# Patient Record
Sex: FEMALE | Race: WHITE | NOT HISPANIC OR LATINO | URBAN - METROPOLITAN AREA
[De-identification: names, ages, dates, MRNs, and addresses within clinical notes are randomized per-mention and may not be internally consistent; named-entity substitution may affect disease eponyms.]

---

## 2018-04-07 ENCOUNTER — INPATIENT (INPATIENT)
Facility: HOSPITAL | Age: 78
LOS: 3 days | Discharge: HOME | End: 2018-04-11
Attending: INTERNAL MEDICINE

## 2018-04-07 VITALS
HEART RATE: 84 BPM | SYSTOLIC BLOOD PRESSURE: 90 MMHG | OXYGEN SATURATION: 98 % | RESPIRATION RATE: 22 BRPM | TEMPERATURE: 96 F | DIASTOLIC BLOOD PRESSURE: 58 MMHG

## 2018-04-07 DIAGNOSIS — C85.19 UNSPECIFIED B-CELL LYMPHOMA, EXTRANODAL AND SOLID ORGAN SITES: ICD-10-CM

## 2018-04-07 DIAGNOSIS — Z79.1 LONG TERM (CURRENT) USE OF NON-STEROIDAL ANTI-INFLAMMATORIES (NSAID): ICD-10-CM

## 2018-04-07 DIAGNOSIS — Z98.890 OTHER SPECIFIED POSTPROCEDURAL STATES: Chronic | ICD-10-CM

## 2018-04-07 DIAGNOSIS — Z90.49 ACQUIRED ABSENCE OF OTHER SPECIFIED PARTS OF DIGESTIVE TRACT: Chronic | ICD-10-CM

## 2018-04-07 DIAGNOSIS — C83.09 SMALL CELL B-CELL LYMPHOMA, EXTRANODAL AND SOLID ORGAN SITES: ICD-10-CM

## 2018-04-07 LAB
ALBUMIN SERPL ELPH-MCNC: 4 G/DL — SIGNIFICANT CHANGE UP (ref 3.5–5.2)
ALP SERPL-CCNC: 112 U/L — SIGNIFICANT CHANGE UP (ref 30–115)
ALT FLD-CCNC: 9 U/L — SIGNIFICANT CHANGE UP (ref 0–41)
ANION GAP SERPL CALC-SCNC: 20 MMOL/L — HIGH (ref 7–14)
APTT BLD: 29.8 SEC — SIGNIFICANT CHANGE UP (ref 27–39.2)
AST SERPL-CCNC: 14 U/L — SIGNIFICANT CHANGE UP (ref 0–41)
BASOPHILS # BLD AUTO: 0.02 K/UL — SIGNIFICANT CHANGE UP (ref 0–0.2)
BASOPHILS # BLD AUTO: 0.04 K/UL — SIGNIFICANT CHANGE UP (ref 0–0.2)
BASOPHILS NFR BLD AUTO: 0.2 % — SIGNIFICANT CHANGE UP (ref 0–1)
BASOPHILS NFR BLD AUTO: 0.3 % — SIGNIFICANT CHANGE UP (ref 0–1)
BILIRUB SERPL-MCNC: 0.4 MG/DL — SIGNIFICANT CHANGE UP (ref 0.2–1.2)
BUN SERPL-MCNC: 48 MG/DL — HIGH (ref 10–20)
C DIFF BY PCR RESULT: NEGATIVE — SIGNIFICANT CHANGE UP
C DIFF TOX GENS STL QL NAA+PROBE: SIGNIFICANT CHANGE UP
CALCIUM SERPL-MCNC: 9.6 MG/DL — SIGNIFICANT CHANGE UP (ref 8.5–10.1)
CHLORIDE SERPL-SCNC: 101 MMOL/L — SIGNIFICANT CHANGE UP (ref 98–110)
CK MB CFR SERPL CALC: 2.1 NG/ML — SIGNIFICANT CHANGE UP (ref 0.6–6.3)
CK SERPL-CCNC: 50 U/L — SIGNIFICANT CHANGE UP (ref 0–225)
CK SERPL-CCNC: 64 U/L — SIGNIFICANT CHANGE UP (ref 0–225)
CO2 SERPL-SCNC: 20 MMOL/L — SIGNIFICANT CHANGE UP (ref 17–32)
CREAT SERPL-MCNC: 1.4 MG/DL — SIGNIFICANT CHANGE UP (ref 0.7–1.5)
EOSINOPHIL # BLD AUTO: 0.05 K/UL — SIGNIFICANT CHANGE UP (ref 0–0.7)
EOSINOPHIL # BLD AUTO: 0.13 K/UL — SIGNIFICANT CHANGE UP (ref 0–0.7)
EOSINOPHIL NFR BLD AUTO: 0.3 % — SIGNIFICANT CHANGE UP (ref 0–8)
EOSINOPHIL NFR BLD AUTO: 2.2 % — SIGNIFICANT CHANGE UP (ref 0–8)
GAS PNL BLDV: SIGNIFICANT CHANGE UP
GLUCOSE SERPL-MCNC: 188 MG/DL — HIGH (ref 70–99)
HCT VFR BLD CALC: 30.6 % — LOW (ref 37–47)
HCT VFR BLD CALC: 30.7 % — LOW (ref 37–47)
HCT VFR BLD CALC: 35.9 % — LOW (ref 37–47)
HCT VFR BLD CALC: 37.6 % — SIGNIFICANT CHANGE UP (ref 37–47)
HGB BLD-MCNC: 10 G/DL — LOW (ref 12–16)
HGB BLD-MCNC: 10.1 G/DL — LOW (ref 12–16)
HGB BLD-MCNC: 11.6 G/DL — LOW (ref 12–16)
HGB BLD-MCNC: 12.5 G/DL — SIGNIFICANT CHANGE UP (ref 12–16)
IMM GRANULOCYTES NFR BLD AUTO: 0.3 % — SIGNIFICANT CHANGE UP (ref 0.1–0.3)
IMM GRANULOCYTES NFR BLD AUTO: 0.5 % — HIGH (ref 0.1–0.3)
INR BLD: 2.37 RATIO — HIGH (ref 0.65–1.3)
LYMPHOCYTES # BLD AUTO: 1.02 K/UL — LOW (ref 1.2–3.4)
LYMPHOCYTES # BLD AUTO: 1.54 K/UL — SIGNIFICANT CHANGE UP (ref 1.2–3.4)
LYMPHOCYTES # BLD AUTO: 25.8 % — SIGNIFICANT CHANGE UP (ref 20.5–51.1)
LYMPHOCYTES # BLD AUTO: 5.4 % — LOW (ref 20.5–51.1)
MCHC RBC-ENTMCNC: 26.9 PG — LOW (ref 27–31)
MCHC RBC-ENTMCNC: 27.2 PG — SIGNIFICANT CHANGE UP (ref 27–31)
MCHC RBC-ENTMCNC: 27.2 PG — SIGNIFICANT CHANGE UP (ref 27–31)
MCHC RBC-ENTMCNC: 27.5 PG — SIGNIFICANT CHANGE UP (ref 27–31)
MCHC RBC-ENTMCNC: 32.3 G/DL — SIGNIFICANT CHANGE UP (ref 32–37)
MCHC RBC-ENTMCNC: 32.7 G/DL — SIGNIFICANT CHANGE UP (ref 32–37)
MCHC RBC-ENTMCNC: 32.9 G/DL — SIGNIFICANT CHANGE UP (ref 32–37)
MCHC RBC-ENTMCNC: 33.2 G/DL — SIGNIFICANT CHANGE UP (ref 32–37)
MCV RBC AUTO: 81.6 FL — SIGNIFICANT CHANGE UP (ref 81–99)
MCV RBC AUTO: 82.8 FL — SIGNIFICANT CHANGE UP (ref 81–99)
MCV RBC AUTO: 83.4 FL — SIGNIFICANT CHANGE UP (ref 81–99)
MCV RBC AUTO: 84.3 FL — SIGNIFICANT CHANGE UP (ref 81–99)
MONOCYTES # BLD AUTO: 0.97 K/UL — HIGH (ref 0.1–0.6)
MONOCYTES # BLD AUTO: 0.99 K/UL — HIGH (ref 0.1–0.6)
MONOCYTES NFR BLD AUTO: 16.6 % — HIGH (ref 1.7–9.3)
MONOCYTES NFR BLD AUTO: 5.1 % — SIGNIFICANT CHANGE UP (ref 1.7–9.3)
NEUTROPHILS # BLD AUTO: 16.83 K/UL — HIGH (ref 1.4–6.5)
NEUTROPHILS # BLD AUTO: 3.28 K/UL — SIGNIFICANT CHANGE UP (ref 1.4–6.5)
NEUTROPHILS NFR BLD AUTO: 54.8 % — SIGNIFICANT CHANGE UP (ref 42.2–75.2)
NEUTROPHILS NFR BLD AUTO: 88.5 % — HIGH (ref 42.2–75.2)
NRBC # BLD: 0 /100 WBCS — SIGNIFICANT CHANGE UP (ref 0–0)
NRBC # BLD: 0 /100 WBCS — SIGNIFICANT CHANGE UP (ref 0–0)
PLATELET # BLD AUTO: 153 K/UL — SIGNIFICANT CHANGE UP (ref 130–400)
PLATELET # BLD AUTO: 185 K/UL — SIGNIFICANT CHANGE UP (ref 130–400)
PLATELET # BLD AUTO: 190 K/UL — SIGNIFICANT CHANGE UP (ref 130–400)
PLATELET # BLD AUTO: 228 K/UL — SIGNIFICANT CHANGE UP (ref 130–400)
POTASSIUM SERPL-MCNC: 4.8 MMOL/L — SIGNIFICANT CHANGE UP (ref 3.5–5)
POTASSIUM SERPL-SCNC: 4.8 MMOL/L — SIGNIFICANT CHANGE UP (ref 3.5–5)
PROT SERPL-MCNC: 7.1 G/DL — SIGNIFICANT CHANGE UP (ref 6–8)
PROTHROM AB SERPL-ACNC: 26 SEC — HIGH (ref 9.95–12.87)
RBC # BLD: 3.67 M/UL — LOW (ref 4.2–5.4)
RBC # BLD: 3.76 M/UL — LOW (ref 4.2–5.4)
RBC # BLD: 4.26 M/UL — SIGNIFICANT CHANGE UP (ref 4.2–5.4)
RBC # BLD: 4.54 M/UL — SIGNIFICANT CHANGE UP (ref 4.2–5.4)
RBC # FLD: 15.8 % — HIGH (ref 11.5–14.5)
RBC # FLD: 15.8 % — HIGH (ref 11.5–14.5)
RBC # FLD: 15.9 % — HIGH (ref 11.5–14.5)
RBC # FLD: 15.9 % — HIGH (ref 11.5–14.5)
SODIUM SERPL-SCNC: 141 MMOL/L — SIGNIFICANT CHANGE UP (ref 135–146)
TROPONIN T SERPL-MCNC: <0.01 NG/ML — SIGNIFICANT CHANGE UP
TROPONIN T SERPL-MCNC: <0.01 NG/ML — SIGNIFICANT CHANGE UP
TYPE + AB SCN PNL BLD: SIGNIFICANT CHANGE UP
WBC # BLD: 10.79 K/UL — SIGNIFICANT CHANGE UP (ref 4.8–10.8)
WBC # BLD: 19.01 K/UL — HIGH (ref 4.8–10.8)
WBC # BLD: 5.98 K/UL — SIGNIFICANT CHANGE UP (ref 4.8–10.8)
WBC # BLD: 7.7 K/UL — SIGNIFICANT CHANGE UP (ref 4.8–10.8)
WBC # FLD AUTO: 10.79 K/UL — SIGNIFICANT CHANGE UP (ref 4.8–10.8)
WBC # FLD AUTO: 19.01 K/UL — HIGH (ref 4.8–10.8)
WBC # FLD AUTO: 5.98 K/UL — SIGNIFICANT CHANGE UP (ref 4.8–10.8)
WBC # FLD AUTO: 7.7 K/UL — SIGNIFICANT CHANGE UP (ref 4.8–10.8)

## 2018-04-07 RX ORDER — MORPHINE SULFATE 50 MG/1
4 CAPSULE, EXTENDED RELEASE ORAL ONCE
Qty: 0 | Refills: 0 | Status: DISCONTINUED | OUTPATIENT
Start: 2018-04-07 | End: 2018-04-07

## 2018-04-07 RX ORDER — TRAMADOL HYDROCHLORIDE 50 MG/1
50 TABLET ORAL THREE TIMES A DAY
Qty: 0 | Refills: 0 | Status: DISCONTINUED | OUTPATIENT
Start: 2018-04-07 | End: 2018-04-11

## 2018-04-07 RX ORDER — METOPROLOL TARTRATE 50 MG
50 TABLET ORAL ONCE
Qty: 0 | Refills: 0 | Status: COMPLETED | OUTPATIENT
Start: 2018-04-07 | End: 2018-04-07

## 2018-04-07 RX ORDER — GABAPENTIN 400 MG/1
100 CAPSULE ORAL THREE TIMES A DAY
Qty: 0 | Refills: 0 | Status: DISCONTINUED | OUTPATIENT
Start: 2018-04-07 | End: 2018-04-11

## 2018-04-07 RX ORDER — PANTOPRAZOLE SODIUM 20 MG/1
80 TABLET, DELAYED RELEASE ORAL ONCE
Qty: 0 | Refills: 0 | Status: COMPLETED | OUTPATIENT
Start: 2018-04-07 | End: 2018-04-07

## 2018-04-07 RX ORDER — METOPROLOL TARTRATE 50 MG
50 TABLET ORAL DAILY
Qty: 0 | Refills: 0 | Status: DISCONTINUED | OUTPATIENT
Start: 2018-04-07 | End: 2018-04-11

## 2018-04-07 RX ORDER — NYSTATIN CREAM 100000 [USP'U]/G
1 CREAM TOPICAL
Qty: 0 | Refills: 0 | Status: DISCONTINUED | OUTPATIENT
Start: 2018-04-07 | End: 2018-04-11

## 2018-04-07 RX ORDER — PANTOPRAZOLE SODIUM 20 MG/1
40 TABLET, DELAYED RELEASE ORAL
Qty: 0 | Refills: 0 | Status: DISCONTINUED | OUTPATIENT
Start: 2018-04-07 | End: 2018-04-08

## 2018-04-07 RX ORDER — SODIUM CHLORIDE 9 MG/ML
1000 INJECTION INTRAMUSCULAR; INTRAVENOUS; SUBCUTANEOUS
Qty: 0 | Refills: 0 | Status: DISCONTINUED | OUTPATIENT
Start: 2018-04-07 | End: 2018-04-11

## 2018-04-07 RX ORDER — SODIUM CHLORIDE 9 MG/ML
1000 INJECTION, SOLUTION INTRAVENOUS ONCE
Qty: 0 | Refills: 0 | Status: COMPLETED | OUTPATIENT
Start: 2018-04-07 | End: 2018-04-07

## 2018-04-07 RX ORDER — PROTHROMBIN COMPLEX CONCENTRATE (HUMAN) 25.5; 16.5; 24; 22; 22; 26 [IU]/ML; [IU]/ML; [IU]/ML; [IU]/ML; [IU]/ML; [IU]/ML
1000 POWDER, FOR SOLUTION INTRAVENOUS ONCE
Qty: 0 | Refills: 0 | Status: DISCONTINUED | OUTPATIENT
Start: 2018-04-07 | End: 2018-04-07

## 2018-04-07 RX ADMIN — NYSTATIN CREAM 1 APPLICATION(S): 100000 CREAM TOPICAL at 18:23

## 2018-04-07 RX ADMIN — PANTOPRAZOLE SODIUM 80 MILLIGRAM(S): 20 TABLET, DELAYED RELEASE ORAL at 05:54

## 2018-04-07 RX ADMIN — MORPHINE SULFATE 4 MILLIGRAM(S): 50 CAPSULE, EXTENDED RELEASE ORAL at 07:33

## 2018-04-07 RX ADMIN — Medication 50 MILLIGRAM(S): at 14:16

## 2018-04-07 RX ADMIN — MORPHINE SULFATE 4 MILLIGRAM(S): 50 CAPSULE, EXTENDED RELEASE ORAL at 08:01

## 2018-04-07 RX ADMIN — GABAPENTIN 100 MILLIGRAM(S): 400 CAPSULE ORAL at 18:21

## 2018-04-07 RX ADMIN — PANTOPRAZOLE SODIUM 40 MILLIGRAM(S): 20 TABLET, DELAYED RELEASE ORAL at 18:22

## 2018-04-07 RX ADMIN — SODIUM CHLORIDE 2000 MILLILITER(S): 9 INJECTION, SOLUTION INTRAVENOUS at 05:44

## 2018-04-07 RX ADMIN — SODIUM CHLORIDE 75 MILLILITER(S): 9 INJECTION INTRAMUSCULAR; INTRAVENOUS; SUBCUTANEOUS at 20:52

## 2018-04-07 NOTE — ED PROVIDER NOTE - MEDICAL DECISION MAKING DETAILS
76yo woman afib on xarelto endorsed to me by Dr Perez for workup of weakness/UGIB. Pt remained hemodynamically stable, no further diarrhea. CTA significant only for enteritis. Spoke with GI, no reversal for now as last dose xarelto was 24+hours ago and not currently bleeding. Plan for observation and endoscopy/colonoscopy monday. Pt seen by ICU, ok for tele for now.

## 2018-04-07 NOTE — CONSULT NOTE ADULT - SUBJECTIVE AND OBJECTIVE BOX
Chief Complaint:  Patient is a 77y old  Female who presents with a chief complaint of melena.    76 yo F PMH of A-Fib on Xarelto, HTN, CAD sp PCI, stenting 9 yrs ago on ASA presented with 1  day of weakness and watery diarrhea described as black stools. Pt also c/o mild cramping pain in the abdomen but no N/V, fever,  changes in weight or appetite. Pt never had EGD or colonoscopy done in the past. But does states that she had a part of small bowel removed 30 yrs dur to ? nodules in the bowel.  Last dose of Xarelto was yesterday in morning and last BM was last night, more than 12 hrs ago. Pt in rapid A Fib in ER but BP and Hb normal.	      Allergies:  penicillin (Anaphylaxis)  penicillin (Unknown)      PMHX/PSHX:  Atrial fibrillation  HTN (hypertension)  Diverticulitis  History of colon resection  H/O abdominal hysterectomy    ROS:     General:  No wt loss, fevers, chills, night sweats.  Eyes:  Good vision, no reported pain  ENT:  No sore throat, pain, runny nose, dysphagia  CV:  No pain, palpitations, hypo/hypertension  Resp:  No dyspnea, cough, tachypnea, wheezing  GI:  See HPI  :  No pain, bleeding, incontinence, nocturia  Muscle:  No pain, weakness  Neuro:  No weakness, tingling, memory problems  Skin:  No rash, edema      PHYSICAL EXAM:     GENERAL:  Appears stated age, overweight, no distress  CHEST:  Full & symmetric excursion, no increased effort, breath sounds clear  HEART:  Regular rhythm, S1, S2, no added sounds  ABDOMEN:  Soft, non-tender, non-distended, normoactive bowel sounds  JESSICA : empty vault, brown loose stool  NEURO:  Alert, oriented, non focal    Vital Signs:  Vital Signs Last 24 Hrs  T(C): 36.4 (07 Apr 2018 07:21), Max: 37.3 (07 Apr 2018 05:10)  T(F): 97.5 (07 Apr 2018 07:21), Max: 99.2 (07 Apr 2018 05:10)  HR: 101 (07 Apr 2018 07:21) (84 - 140)  BP: 141/60 (07 Apr 2018 07:21) (90/58 - 141/60)  BP(mean): --  RR: 18 (07 Apr 2018 07:21) (18 - 22)  SpO2: 100% (07 Apr 2018 07:21) (98% - 100%)  Daily     Daily     LABS:                        12.5   19.01 )-----------( 228      ( 07 Apr 2018 05:00 )             37.6     04-07    141  |  101  |  48<H>  ----------------------------<  188<H>  4.8   |  20  |  1.4    Ca    9.6      07 Apr 2018 05:00    TPro  7.1  /  Alb  4.0  /  TBili  0.4  /  DBili  x   /  AST  14  /  ALT  9   /  AlkPhos  112  04-07    LIVER FUNCTIONS - ( 07 Apr 2018 05:00 )  Alb: 4.0 g/dL / Pro: 7.1 g/dL / ALK PHOS: 112 U/L / ALT: 9 U/L / AST: 14 U/L / GGT: x           PT/INR - ( 07 Apr 2018 05:00 )   PT: 26.00 sec;   INR: 2.37 ratio         PTT - ( 07 Apr 2018 05:00 )  PTT:29.8 sec        Imaging: < from: CT Angio Abdomen and Pelvis w/ IV Cont (04.07.18 @ 07:23) >  Findings compatible with enteritis involving the distal small bowel.    No definite evidence of intravenous contrast extravasation on the   arterial phase of imaging.      < end of copied text >

## 2018-04-07 NOTE — ED PROVIDER NOTE - PROGRESS NOTE DETAILS
dark guaiac positive stool spoke to GI Dr. Pereyra, will evaluate the pt pj - Pt signed out to me by Dr. Goldstein Spoke with Dr. Pereyra, awaiting evaluation Dr. Pereyra at bedside. Plan for EGD/colo on monday Spoke with Dr. Perez (pulm/ICU), will come see patient Spoke with Dr. Arredondo (pulm/ICU), will come see patient Approved for ICU by Dr. Arredondo As per Dr. Arredondo, ICU if drop in Hb, else tele

## 2018-04-07 NOTE — H&P ADULT - ASSESSMENT
76 yo female coming from home with PMH of Afib on Xarelto, HTN, Diverticulitis comes to the ED with c/o dark stools and lethargy for last 1 day. In the ED her BP was 90/50 responded to normal saline and became hemodynamically stable.    # GI Bleed: likely lower GI bleed, R/O upper GI also, admit to regular floor, keep NPO, active type and screen, 2X 18 Iv gauge, Keep Hg> 8, Hold Xarelto, ASA as of now. Will possibly get EGD/Colonoscopy on Monday (4/9/2018) or early if rebleeds and hemodynamically unstable. Start on Protonix 40IV q12.  Pulm fellow saw the patient( No need of ICU monitoring)    - HTN: Hold on home meds, Keep MAP>65    - Afib on Xarelto : Hold xarelto, ASA as of now, consider calling cardiology.    DVT PPX: sequentials  GI PPX  OOB  NPO   Full code 78 yo female coming from home with PMH of Afib on Xarelto, HTN, Diverticulitis comes to the ED with c/o dark stools and lethargy for last 1 day. In the ED her BP was 90/50 responded to normal saline and became hemodynamically stable.    # GI Bleed: likely lower GI bleed, R/O upper GI also, admit to regular floor, keep NPO, active type and screen, 2X 18 Iv gauge, Keep Hg> 8, Hold Xarelto, ASA as of now. Will possibly get EGD/Colonoscopy on Monday (4/9/2018) or early if rebleeds and hemodynamically unstable. Start on Protonix 40IV q12.  Pulm fellow saw the patient( No need of ICU monitoring)    - HTN: Hold on home meds, Keep MAP>65    - Afib on Xarelto : Hold xarelto, consider calling cardiology for re evaluation.    DVT PPX: sequentials  GI PPX  OOB  NPO   Full code 76 yo female coming from home with PMH of Afib on Xarelto, HTN, Diverticulitis comes to the ED with c/o dark stools and lethargy for last 1 day. In the ED her BP was 90/50 responded to normal saline and became hemodynamically stable.    # GI Bleed: likely lower GI bleed, R/O upper GI also, admit to regular floor, keep NPO, active type and screen, 2X 18 Iv gauge, Keep Hg> 8, Hold Xarelto, ASA as of now. Will possibly get EGD/Colonoscopy on Monday (4/9/2018) or early if rebleeds and hemodynamically unstable. Start on Protonix 40IV q12.  Pulm fellow saw the patient( No need of ICU monitoring)    - HTN: Hold on home meds, Keep MAP>65    - Afib on Xarelto : currently HR @ 120Hold xarelto, consider calling cardiology for re evaluation.    DVT PPX: sequentials  GI PPX  OOB  NPO   Full code 78 yo female coming from home with PMH of Afib on Xarelto, HTN, Diverticulitis comes to the ED with c/o dark stools and lethargy for last 1 day. In the ED her BP was 90/50 responded to normal saline and became hemodynamically stable.    # GI Bleed: likely lower GI bleed, R/O upper GI also, admit to telemetry floor, keep NPO, active type and screen, 2X 18 Iv gauge, Keep Hg> 8, Hold Xarelto, ASA as of now. Will possibly get EGD/Colonoscopy on Monday (4/9/2018) or early if rebleeds and hemodynamically unstable. Start on Protonix 40IV q12.  Pulm fellow saw the patient( No need of ICU monitoring).    # enteritis: ON CT scan, will send stool WBC, cx, C diff. clinically seems non significant. No need of Abx.    - HTN: Hold on home meds, Keep MAP>65    - Afib on Xarelto : currently HR @ 120, will start metoprolol oral 50mg now. Hold xarelto, consider calling cardiology for re evaluation.    DVT PPX: sequentials  GI PPX  OOB  NPO   Full code 78 yo female coming from home with PMH of Afib on Xarelto, HTN, Diverticulitis comes to the ED with c/o dark stools and lethargy for last 1 day. In the ED her BP was 90/50 responded to normal saline and became hemodynamically stable.    # GI Bleed: likely lower GI bleed, R/O upper GI also, admit to telemetry floor, keep NPO, active type and screen, 2X 18 Iv gauge, Keep Hg> 8, Hold Xarelto, ASA as of now. Will possibly get EGD/Colonoscopy on Monday (4/9/2018) or early if rebleeds and hemodynamically unstable. Start on Protonix 40IV q12.  Pulm fellow saw the patient( No need of ICU monitoring).    # enteritis: ON CT scan, clinically seems non significant. No need of Abx, send stool studies if starts loose stools.    - HTN: Hold on home meds, Keep MAP>65    - Afib on Xarelto : currently HR @ 120, will start metoprolol oral 50mg now. Hold xarelto, consider calling cardiology for re evaluation.    DVT PPX: sequentials  GI PPX  OOB  NPO   Full code

## 2018-04-07 NOTE — ED ADULT NURSE REASSESSMENT NOTE - NS ED NURSE REASSESS COMMENT FT1
Pt received to care assessed a & ox 4, VSS in no acute distress. Pt returned from CT scan. will continue to monitor

## 2018-04-07 NOTE — CONSULT NOTE ADULT - SUBJECTIVE AND OBJECTIVE BOX
Patient is a 77y old  Female who presents with a chief complaint of diarrhea and fall. As per her for 1 day she is been having dark stool. At night she had a episode of presyncope after which ems was called in. In er found to be hypotensive and tachycardiac. Given fluid challenge to which she responded. Has been taking NSAIDS on and off for her arthritis and recent diagnosis of shingles. No alcohol use. Never had EGD or colonoscopy done. SP gi evaluation for possible ED on Monday.        PAST MEDICAL & SURGICAL HISTORY:  Atrial fibrillation  HTN (hypertension)  Diverticulitis  History of colon resection  H/O abdominal hysterectomy      SOCIAL HX: Quit few months: Smoking                             Allergies    penicillin (Anaphylaxis)  penicillin (Unknown)    PHYSICAL EXAM  Vital Signs Last 24 Hrs  T(C): 36.4 (07 Apr 2018 07:21), Max: 37.3 (07 Apr 2018 05:10)  T(F): 97.5 (07 Apr 2018 07:21), Max: 99.2 (07 Apr 2018 05:10)  HR: 101 (07 Apr 2018 07:21) (84 - 140)  BP: 141/60 (07 Apr 2018 07:21) (90/58 - 141/60)  BP(mean): --  RR: 18 (07 Apr 2018 07:21) (18 - 22)  SpO2: 100% (07 Apr 2018 07:21) (98% - 100%)    General:    HEENT: AAO x3            Lymph Nodes: No lymphadenopathy  Neck:  Supple  Lungs: Clear bilaterally  Cardiovascular: S1S2  Abdomen: Soft, non tender  Extremities: NO edema or cyanosis  Skin: Intact  Neuro: non focal          LAB:                        12.5   19.01 )-----------( 228      ( 07 Apr 2018 05:00 )             37.6                                               04-07    141  |  101  |  48<H>  ----------------------------<  188<H>  4.8   |  20  |  1.4    Ca    9.6      07 Apr 2018 05:00    TPro  7.1  /  Alb  4.0  /  TBili  0.4  /  DBili  x   /  AST  14  /  ALT  9   /  AlkPhos  112  04-07      PT/INR - ( 07 Apr 2018 05:00 )   PT: 26.00 sec;   INR: 2.37 ratio         PTT - ( 07 Apr 2018 05:00 )  PTT:29.8 sec                                               CARDIAC MARKERS ( 07 Apr 2018 05:00 )  x     / <0.01 ng/mL / 50 U/L / x     / 2.1 ng/mL                                            LIVER FUNCTIONS - ( 07 Apr 2018 05:00 )  Alb: 4.0 g/dL / Pro: 7.1 g/dL / ALK PHOS: 112 U/L / ALT: 9 U/L / AST: 14 U/L / GGT: x

## 2018-04-07 NOTE — ED PROVIDER NOTE - OBJECTIVE STATEMENT
78 yo F w hxo f a fib on xarelto, htn, diverticulitis c/o 1 day of weakness and dark diarrhea.  No n/v.  no chest pain.  Pt states she got up at night to go the bathroom and felt like her legs buckled.

## 2018-04-07 NOTE — ED PROVIDER NOTE - ATTENDING CONTRIBUTION TO CARE
78 yo hx of afib on xarelto, s/p dizzy/near syncope episode at home, c/o 1 day of dark stools and ab pain.  no cp or sob. no n, v. pt in mild dist, +pallor, pink conj, ncat, perrl, neck sup, ctab, diffusely tender, dark stool guiac +, no gross blood. non focal. will get labs, fluids, ekg, cxr, monitor.

## 2018-04-07 NOTE — H&P ADULT - NSHPLABSRESULTS_GEN_ALL_CORE
11.6   10.79 )-----------( 190      ( 07 Apr 2018 11:14 )             35.9       04-07    141  |  101  |  48<H>  ----------------------------<  188<H>  4.8   |  20  |  1.4    Ca    9.6      07 Apr 2018 05:00    TPro  7.1  /  Alb  4.0  /  TBili  0.4  /  DBili  x   /  AST  14  /  ALT  9   /  AlkPhos  112  04-07                  PT/INR - ( 07 Apr 2018 05:00 )   PT: 26.00 sec;   INR: 2.37 ratio         PTT - ( 07 Apr 2018 05:00 )  PTT:29.8 sec    Lactate Trend      CARDIAC MARKERS ( 07 Apr 2018 05:00 )  x     / <0.01 ng/mL / 50 U/L / x     / 2.1 ng/mL

## 2018-04-07 NOTE — ED ADULT NURSE NOTE - OBJECTIVE STATEMENT
Pt reports diarrhea, intermittent cramping abdominal pain, weakness, mild shortness of breath since yesterday.

## 2018-04-07 NOTE — H&P ADULT - ATTENDING COMMENTS
Patient was evaluated and examined by bedside, decreased abdominal pain, no rectal bleeding or melena episodes.    All labs, radiology studies, VS was reviewed  I have reviewed medical plan outlined by Medical resident   -Possible GI bleeding event at home, no further episodes of bleeding, with stable hemoglobin level, start patient on clear liquid diet, change to po protonix tx.  f/up GI  - Acute Enteritis- abdominal pain decreased, started on Clear liquid diet trial  -h/o afib- xarelto on hold, uncontrolled rate, f/up TSH LEVELS, increased lopressor dose, continue cardiac monitoring  -anemia- continue to monitor daily CBC

## 2018-04-07 NOTE — H&P ADULT - HISTORY OF PRESENT ILLNESS
76 yo female coming from home, with PMH stated below including Diverticultis, Afib on xarelto, post herpetic neuralgia taking NSAIDs, CAD on ASA, was doing fine until yesterday when she felt lower abdominal cramps followed by very dark bowel movement, semi solid in nature, no blood seen. She had total of 6 episodes, with the last 3 of them were brown but she started feeling dizzy so she decided to come tot the ED.   In the ED BP was 90/60 HR 84/min, was given NS 1L, she responded to fluids. As of now she is c/o cramps and last BM was 12 hrs ago.

## 2018-04-07 NOTE — CONSULT NOTE ADULT - ASSESSMENT
IMPRESSION:  ? Upper GI bleed  enteritis on CT abdomen  HO Afib on xarelto  Active NSAIDs use  Smoker+        PLAN:    CNS: NO depressant    HEENT:  Oral care    PULMONARY:  HOB @ 45 degrees    CARDIOVASCULAR: keep i=O    GI:   Continue with protnix drip for now.                                        Feeding: Feeding as tolerated if no acute drop in hemoglobin and intervention by GI.     RENAL:  F/u  lytes.  Correct as needed     INFECTIOUS DISEASE: PAN cultutre    HEMATOLOGICAL:  Sequentials for now. Hold Xarelto and aspirin.    ENDOCRINE:  Follow up FS.     MUSCULOSKELETAL: Out of bed to chair    CODE STATUS: FULL CODE    DISPOSITION: Pt requires continued monitoring in the MICU IMPRESSION:  ? Upper GI bleed  enteritis on CT abdomen  HO Afib on xarelto  Active NSAIDs use  Smoker+        PLAN:    CNS: NO depressant    HEENT:  Oral care    PULMONARY:  HOB @ 45 degrees    CARDIOVASCULAR: keep i=O    GI:   Continue with protnix Q12                                     Feeding: Feeding as tolerated if no acute drop in hemoglobin and intervention by GI.     RENAL:  F/u  lytes.  Correct as needed     INFECTIOUS DISEASE: PAN cultutre    HEMATOLOGICAL:  Sequentials for now. Hold Xarelto and aspirin.     ENDOCRINE:  Follow up FS.     MUSCULOSKELETAL: Out of bed to chair    CODE STATUS: FULL CODE    DISPOSITION: Pt requires continued monitoring in the MICU IMPRESSION:  ? Upper GI bleed  enteritis on CT abdomen  HO Afib on xarelto  Active NSAIDs use  Smoker+        PLAN:    CNS: NO depressant    HEENT:  Oral care    PULMONARY:  HOB @ 45 degrees    CARDIOVASCULAR: keep i=O    GI:   Continue with protnix Q12                                     Feeding: Feeding as tolerated if no acute drop in hemoglobin and intervention by GI.     RENAL:  F/u  lytes.  Correct as needed     INFECTIOUS DISEASE: PAN cultutre    HEMATOLOGICAL:  Sequentials for now. Hold Xarelto and aspirin.     ENDOCRINE:  Follow up FS.     MUSCULOSKELETAL: Out of bed to chair    CODE STATUS: FULL CODE    DISPOSITION: Pt does not  require continued monitoring in the MICU

## 2018-04-07 NOTE — ED PROVIDER NOTE - NS ED ROS FT
Eyes:  No visual changes, eye pain or discharge.  ENMT:  No hearing changes, pain, no sore throat or runny nose, no difficulty swallowing  Cardiac:  No chest pain, SOB or edema. No chest pain with exertion.  Respiratory:  No cough or respiratory distress. No hemoptysis. No history of asthma or RAD.  GI:  melena   :  No dysuria, frequency or burning.  MS:  No myalgia, muscle weakness, joint pain or back pain.  Neuro: weakness   Skin:  No skin rash.   Endocrine: No history of thyroid disease or diabetes.

## 2018-04-07 NOTE — H&P ADULT - PMH
Atrial fibrillation    Diverticulitis    HTN (hypertension) Atrial fibrillation    CAD S/P percutaneous coronary angioplasty    Diverticulitis    HTN (hypertension)

## 2018-04-07 NOTE — H&P ADULT - NSHPPHYSICALEXAM_GEN_ALL_CORE
PHYSICAL EXAM:      Constitutional: well developed and well nourished    Respiratory: lungs B/L clear on auscultation    Cardiovascular: S1S2 normal, no murmur heard    Gastrointestinal: Abd soft, non distended, non tender, BS+    Extremities: No pedal edema    Neurological: AAOX3, No FND PHYSICAL EXAM:      Constitutional: well developed and well nourished    Respiratory: lungs B/L clear on auscultation    Cardiovascular: tachycardia, no murmur heard, irregular rhytm    Gastrointestinal: Abd soft, non distended, non tender, BS+    Extremities: No pedal edema    Neurological: AAOX3, No FND

## 2018-04-07 NOTE — CONSULT NOTE ADULT - ASSESSMENT
76 yo F PMH of A-Fib on Xarelto, HTN, CAD sp PCI, stenting 9 yrs ago on ASA presented with 1  day of weakness and watery diarrhea described as black stools and cramping abdopminal pain and was found to have enteritis on CT abdomen , no drop in Hb and hemodynamically stable.    ?  GI Bleed :  - Keep NPO for now, 2 18 G IVs, Active type and Screen  - PPI IV Q12H, Gentle Hydration.  - Monitor CBC and transfuse if needed.  - Hold Xarelto for now,  cardiology eval for Rapid A Fib.  - Will schedule for EGD and Colonoscopy after holding Xarelto for 72 hrs or early in case of active bleed.  - Will follow up.

## 2018-04-08 LAB
ALBUMIN SERPL ELPH-MCNC: 3.4 G/DL — LOW (ref 3.5–5.2)
ALP SERPL-CCNC: 86 U/L — SIGNIFICANT CHANGE UP (ref 30–115)
ALT FLD-CCNC: 6 U/L — SIGNIFICANT CHANGE UP (ref 0–41)
ANION GAP SERPL CALC-SCNC: 14 MMOL/L — SIGNIFICANT CHANGE UP (ref 7–14)
AST SERPL-CCNC: 13 U/L — SIGNIFICANT CHANGE UP (ref 0–41)
BASOPHILS # BLD AUTO: 0.01 K/UL — SIGNIFICANT CHANGE UP (ref 0–0.2)
BASOPHILS NFR BLD AUTO: 0.2 % — SIGNIFICANT CHANGE UP (ref 0–1)
BILIRUB SERPL-MCNC: 0.5 MG/DL — SIGNIFICANT CHANGE UP (ref 0.2–1.2)
BUN SERPL-MCNC: 26 MG/DL — HIGH (ref 10–20)
CALCIUM SERPL-MCNC: 8.4 MG/DL — LOW (ref 8.5–10.1)
CHLORIDE SERPL-SCNC: 108 MMOL/L — SIGNIFICANT CHANGE UP (ref 98–110)
CO2 SERPL-SCNC: 21 MMOL/L — SIGNIFICANT CHANGE UP (ref 17–32)
CREAT SERPL-MCNC: 0.9 MG/DL — SIGNIFICANT CHANGE UP (ref 0.7–1.5)
EOSINOPHIL # BLD AUTO: 0.19 K/UL — SIGNIFICANT CHANGE UP (ref 0–0.7)
EOSINOPHIL NFR BLD AUTO: 3.7 % — SIGNIFICANT CHANGE UP (ref 0–8)
GLUCOSE SERPL-MCNC: 107 MG/DL — HIGH (ref 70–99)
HCT VFR BLD CALC: 32 % — LOW (ref 37–47)
HGB BLD-MCNC: 10.4 G/DL — LOW (ref 12–16)
IMM GRANULOCYTES NFR BLD AUTO: 0.2 % — SIGNIFICANT CHANGE UP (ref 0.1–0.3)
LYMPHOCYTES # BLD AUTO: 1.36 K/UL — SIGNIFICANT CHANGE UP (ref 1.2–3.4)
LYMPHOCYTES # BLD AUTO: 26.6 % — SIGNIFICANT CHANGE UP (ref 20.5–51.1)
MAGNESIUM SERPL-MCNC: 2.1 MG/DL — SIGNIFICANT CHANGE UP (ref 1.8–2.4)
MCHC RBC-ENTMCNC: 27.2 PG — SIGNIFICANT CHANGE UP (ref 27–31)
MCHC RBC-ENTMCNC: 32.5 G/DL — SIGNIFICANT CHANGE UP (ref 32–37)
MCV RBC AUTO: 83.6 FL — SIGNIFICANT CHANGE UP (ref 81–99)
MONOCYTES # BLD AUTO: 0.78 K/UL — HIGH (ref 0.1–0.6)
MONOCYTES NFR BLD AUTO: 15.3 % — HIGH (ref 1.7–9.3)
NEUTROPHILS # BLD AUTO: 2.76 K/UL — SIGNIFICANT CHANGE UP (ref 1.4–6.5)
NEUTROPHILS NFR BLD AUTO: 54 % — SIGNIFICANT CHANGE UP (ref 42.2–75.2)
PLATELET # BLD AUTO: 107 K/UL — LOW (ref 130–400)
POTASSIUM SERPL-MCNC: 4.8 MMOL/L — SIGNIFICANT CHANGE UP (ref 3.5–5)
POTASSIUM SERPL-SCNC: 4.8 MMOL/L — SIGNIFICANT CHANGE UP (ref 3.5–5)
PROT SERPL-MCNC: 6.2 G/DL — SIGNIFICANT CHANGE UP (ref 6–8)
RBC # BLD: 3.83 M/UL — LOW (ref 4.2–5.4)
RBC # FLD: 15.9 % — HIGH (ref 11.5–14.5)
SODIUM SERPL-SCNC: 143 MMOL/L — SIGNIFICANT CHANGE UP (ref 135–146)
WBC # BLD: 5.11 K/UL — SIGNIFICANT CHANGE UP (ref 4.8–10.8)
WBC # FLD AUTO: 5.11 K/UL — SIGNIFICANT CHANGE UP (ref 4.8–10.8)

## 2018-04-08 RX ORDER — METOPROLOL TARTRATE 50 MG
25 TABLET ORAL ONCE
Qty: 0 | Refills: 0 | Status: COMPLETED | OUTPATIENT
Start: 2018-04-08 | End: 2018-04-08

## 2018-04-08 RX ORDER — PANTOPRAZOLE SODIUM 20 MG/1
40 TABLET, DELAYED RELEASE ORAL
Qty: 0 | Refills: 0 | Status: DISCONTINUED | OUTPATIENT
Start: 2018-04-08 | End: 2018-04-11

## 2018-04-08 RX ADMIN — TRAMADOL HYDROCHLORIDE 50 MILLIGRAM(S): 50 TABLET ORAL at 16:10

## 2018-04-08 RX ADMIN — TRAMADOL HYDROCHLORIDE 50 MILLIGRAM(S): 50 TABLET ORAL at 15:13

## 2018-04-08 RX ADMIN — PANTOPRAZOLE SODIUM 40 MILLIGRAM(S): 20 TABLET, DELAYED RELEASE ORAL at 06:34

## 2018-04-08 RX ADMIN — Medication 25 MILLIGRAM(S): at 17:39

## 2018-04-08 RX ADMIN — GABAPENTIN 100 MILLIGRAM(S): 400 CAPSULE ORAL at 22:24

## 2018-04-08 RX ADMIN — NYSTATIN CREAM 1 APPLICATION(S): 100000 CREAM TOPICAL at 06:32

## 2018-04-08 RX ADMIN — GABAPENTIN 100 MILLIGRAM(S): 400 CAPSULE ORAL at 07:11

## 2018-04-08 RX ADMIN — NYSTATIN CREAM 1 APPLICATION(S): 100000 CREAM TOPICAL at 17:39

## 2018-04-08 RX ADMIN — GABAPENTIN 100 MILLIGRAM(S): 400 CAPSULE ORAL at 14:11

## 2018-04-08 RX ADMIN — SODIUM CHLORIDE 75 MILLILITER(S): 9 INJECTION INTRAMUSCULAR; INTRAVENOUS; SUBCUTANEOUS at 11:50

## 2018-04-08 RX ADMIN — Medication 50 MILLIGRAM(S): at 07:11

## 2018-04-08 NOTE — PROGRESS NOTE ADULT - SUBJECTIVE AND OBJECTIVE BOX
BRI AGUILAR 77y Female   MRN#: [id number]    Patient is a 77y old Female who presents with a chief complaint of Black stools X 1day  Lethargy X 1 day (07 Apr 2018 12:53)    Currently admitted to medicine with the primary diagnosis of GI bleed     Today is hospital day 1d. This morning she is resting comfortably in bed and reports no new issues or overnight events.     PAST MEDICAL & SURGICAL HISTORY  CAD S/P percutaneous coronary angioplasty  Atrial fibrillation  HTN (hypertension)  Diverticulitis  S/P small bowel resection  H/O abdominal hysterectomy      ALLERGIES:  penicillin (Anaphylaxis)  penicillin (Unknown)      MEDICATIONS:  STANDING MEDICATIONS  gabapentin 100 milliGRAM(s) Oral three times a day  metoprolol succinate ER 50 milliGRAM(s) Oral daily  nystatin Cream 1 Application(s) Topical two times a day  pantoprazole  Injectable 40 milliGRAM(s) IV Push two times a day  sodium chloride 0.9%. 1000 milliLiter(s) IV Continuous <Continuous>    PRN MEDICATIONS  traMADol 50 milliGRAM(s) Oral three times a day PRN      VITALS:   T(F): 97.8  HR: 100  BP: 136/66  RR: 18  SpO2: 99%    LABS:                        10.4   5.11  )-----------( 107      ( 08 Apr 2018 08:14 )             32.0     04-08    143  |  108  |  26<H>  ----------------------------<  107<H>  4.8   |  21  |  0.9    Ca    8.4<L>      08 Apr 2018 08:14  Mg     2.1     04-08    TPro  6.2  /  Alb  3.4<L>  /  TBili  0.5  /  DBili  x   /  AST  13  /  ALT  6   /  AlkPhos  86  04-08    PT/INR - ( 07 Apr 2018 05:00 )   PT: 26.00 sec;   INR: 2.37 ratio         PTT - ( 07 Apr 2018 05:00 )  PTT:29.8 sec      Creatine Kinase, Serum: 64 U/L (04-07-18 @ 16:56)  Troponin T, Serum: <0.01 ng/mL (04-07-18 @ 16:56)      CARDIAC MARKERS ( 07 Apr 2018 16:56 )  x     / <0.01 ng/mL / 64 U/L / x     / x      CARDIAC MARKERS ( 07 Apr 2018 05:00 )  x     / <0.01 ng/mL / 50 U/L / x     / 2.1 ng/mL        RADIOLOGY:  CT Angio Abdomen and Pelvis w/ IV Cont (04.07.18 @ 07:23)   IMPRESSION:      Findings compatible with enteritis involving the distal small bowel.    No definite evidence of intravenous contrast extravasation on the   arterial phase of imaging.        PHYSICAL EXAM:    GENERAL: NAD, well-developed  HEAD:  Atraumatic, Normocephalic  EYES: EOMI, PERRLA, conjunctiva and sclera clear  NECK: Supple, No JVD  CHEST/LUNG: Clear to auscultation bilaterally; No wheeze  HEART: Regular rate and rhythm; No murmurs, rubs, or gallops  ABDOMEN: Soft, Nontender, Nondistended; Bowel sounds present  EXTREMITIES:  2+ Peripheral Pulses, No clubbing, cyanosis, or edema  PSYCH: AAOx3  NEUROLOGY: non-focal  SKIN: No rashes or lesions BRI AGUILAR 77y Female   MRN#: [id number]    Patient is a 77y old Female who presents with a chief complaint of Black stools X 1day  Lethargy X 1 day (07 Apr 2018 12:53)    Currently admitted to medicine with the primary diagnosis of GI bleed     Today is hospital day 1d. This morning she is resting comfortably in bed and reports no new issues or overnight events. Feels well, no current complaints     PAST MEDICAL & SURGICAL HISTORY  CAD S/P percutaneous coronary angioplasty  Atrial fibrillation  HTN (hypertension)  Diverticulitis  S/P small bowel resection  H/O abdominal hysterectomy      ALLERGIES:  penicillin (Anaphylaxis)  penicillin (Unknown)      MEDICATIONS:  STANDING MEDICATIONS  gabapentin 100 milliGRAM(s) Oral three times a day  metoprolol succinate ER 50 milliGRAM(s) Oral daily  nystatin Cream 1 Application(s) Topical two times a day  pantoprazole  Injectable 40 milliGRAM(s) IV Push two times a day  sodium chloride 0.9%. 1000 milliLiter(s) IV Continuous <Continuous>    PRN MEDICATIONS  traMADol 50 milliGRAM(s) Oral three times a day PRN      VITALS:   T(F): 97.8  HR: 100  BP: 136/66  RR: 18  SpO2: 99%    LABS:                        10.4   5.11  )-----------( 107      ( 08 Apr 2018 08:14 )             32.0     04-08    143  |  108  |  26<H>  ----------------------------<  107<H>  4.8   |  21  |  0.9    Ca    8.4<L>      08 Apr 2018 08:14  Mg     2.1     04-08    TPro  6.2  /  Alb  3.4<L>  /  TBili  0.5  /  DBili  x   /  AST  13  /  ALT  6   /  AlkPhos  86  04-08    PT/INR - ( 07 Apr 2018 05:00 )   PT: 26.00 sec;   INR: 2.37 ratio         PTT - ( 07 Apr 2018 05:00 )  PTT:29.8 sec      Creatine Kinase, Serum: 64 U/L (04-07-18 @ 16:56)  Troponin T, Serum: <0.01 ng/mL (04-07-18 @ 16:56)      CARDIAC MARKERS ( 07 Apr 2018 16:56 )  x     / <0.01 ng/mL / 64 U/L / x     / x      CARDIAC MARKERS ( 07 Apr 2018 05:00 )  x     / <0.01 ng/mL / 50 U/L / x     / 2.1 ng/mL        RADIOLOGY:  CT Angio Abdomen and Pelvis w/ IV Cont (04.07.18 @ 07:23)   IMPRESSION:      Findings compatible with enteritis involving the distal small bowel.    No definite evidence of intravenous contrast extravasation on the   arterial phase of imaging.        PHYSICAL EXAM:    GENERAL: NAD, obese   HEAD:  Atraumatic, Normocephalic  EYES: EOMI, PERRLA, conjunctiva and sclera clear  CHEST/LUNG: Clear to auscultation bilaterally; No wheeze  HEART: Regular rate and irreg rhythm; No murmurs, rubs, or gallops  ABDOMEN: Soft, mild epigastric tpp (states its pressure as if she is gassy), Nondistended; Bowel sounds present  EXTREMITIES: +1-2 non-pitting edema b.l lower ext. TTP b/l but its chronic No clubbing, cyanosis, or edema  PSYCH: AAOx3  NEUROLOGY: non-focal

## 2018-04-08 NOTE — PROGRESS NOTE ADULT - ASSESSMENT
76 yo female coming from home with PMH of Afib on Xarelto, HTN, Diverticulitis comes to the ED with c/o dark stools and lethargy for last 1 day. In the ED her BP was 90/50 responded to normal saline and became hemodynamically stable. Never had [place] in past.     GI Bleed: likely lower GI bleed, R/O upper GI also  - GI Following: Dr. Boone to scope after 72hrs off Xarelto or earlier if GI bleed continues  - Cont Protonix IV q12   - NPO for now w. IV hydration  - Hold Xarelto and ASA  - HB stable at this time   - Transfuse if <7 or if symptomatic   	  Enteritis:   - Doubt clinical significance  - Observe clinically. if diarrhea worsens or is showing signs systemic infection (fever, inc WBC) will send stool studies and start abx    HTN: Hypotension resolved s/p fluid   - Holding home medications now   - Monitor, resume meds when BP becomes hypertensive     Afib: Controlled   - Metoprolol oral 50mg   - Hold xarelto  - If Persistently uncontrolled will need cardio eval.     DVT PPX: sequentials  GI PPX: PPI IV q12   OOB  NPO  Full code

## 2018-04-08 NOTE — PATIENT PROFILE ADULT. - VISION (WITH CORRECTIVE LENSES IF THE PATIENT USUALLY WEARS THEM):
Partially impaired: cannot see medication labels or newsprint, but can see obstacles in path, and the surrounding layout; can count fingers at arm's length/contact lenses in

## 2018-04-09 LAB
ANION GAP SERPL CALC-SCNC: 11 MMOL/L — SIGNIFICANT CHANGE UP (ref 7–14)
BASOPHILS # BLD AUTO: 0.01 K/UL — SIGNIFICANT CHANGE UP (ref 0–0.2)
BASOPHILS NFR BLD AUTO: 0.2 % — SIGNIFICANT CHANGE UP (ref 0–1)
BUN SERPL-MCNC: 14 MG/DL — SIGNIFICANT CHANGE UP (ref 10–20)
CALCIUM SERPL-MCNC: 7.7 MG/DL — LOW (ref 8.5–10.1)
CHLORIDE SERPL-SCNC: 105 MMOL/L — SIGNIFICANT CHANGE UP (ref 98–110)
CO2 SERPL-SCNC: 23 MMOL/L — SIGNIFICANT CHANGE UP (ref 17–32)
CREAT SERPL-MCNC: 0.7 MG/DL — SIGNIFICANT CHANGE UP (ref 0.7–1.5)
EOSINOPHIL # BLD AUTO: 0.14 K/UL — SIGNIFICANT CHANGE UP (ref 0–0.7)
EOSINOPHIL NFR BLD AUTO: 2.6 % — SIGNIFICANT CHANGE UP (ref 0–8)
GLUCOSE SERPL-MCNC: 105 MG/DL — HIGH (ref 70–99)
HCT VFR BLD CALC: 28.3 % — LOW (ref 37–47)
HGB BLD-MCNC: 9.3 G/DL — LOW (ref 12–16)
IMM GRANULOCYTES NFR BLD AUTO: 0.4 % — HIGH (ref 0.1–0.3)
LYMPHOCYTES # BLD AUTO: 1.42 K/UL — SIGNIFICANT CHANGE UP (ref 1.2–3.4)
LYMPHOCYTES # BLD AUTO: 26.7 % — SIGNIFICANT CHANGE UP (ref 20.5–51.1)
MCHC RBC-ENTMCNC: 27.4 PG — SIGNIFICANT CHANGE UP (ref 27–31)
MCHC RBC-ENTMCNC: 32.9 G/DL — SIGNIFICANT CHANGE UP (ref 32–37)
MCV RBC AUTO: 83.2 FL — SIGNIFICANT CHANGE UP (ref 81–99)
MONOCYTES # BLD AUTO: 0.6 K/UL — SIGNIFICANT CHANGE UP (ref 0.1–0.6)
MONOCYTES NFR BLD AUTO: 11.3 % — HIGH (ref 1.7–9.3)
NEUTROPHILS # BLD AUTO: 3.12 K/UL — SIGNIFICANT CHANGE UP (ref 1.4–6.5)
NEUTROPHILS NFR BLD AUTO: 58.8 % — SIGNIFICANT CHANGE UP (ref 42.2–75.2)
PLATELET # BLD AUTO: 90 K/UL — LOW (ref 130–400)
POTASSIUM SERPL-MCNC: 4.1 MMOL/L — SIGNIFICANT CHANGE UP (ref 3.5–5)
POTASSIUM SERPL-SCNC: 4.1 MMOL/L — SIGNIFICANT CHANGE UP (ref 3.5–5)
RBC # BLD: 3.4 M/UL — LOW (ref 4.2–5.4)
RBC # FLD: 15.9 % — HIGH (ref 11.5–14.5)
SODIUM SERPL-SCNC: 139 MMOL/L — SIGNIFICANT CHANGE UP (ref 135–146)
TSH SERPL-MCNC: 5.67 UIU/ML — HIGH (ref 0.27–4.2)
WBC # BLD: 5.31 K/UL — SIGNIFICANT CHANGE UP (ref 4.8–10.8)
WBC # FLD AUTO: 5.31 K/UL — SIGNIFICANT CHANGE UP (ref 4.8–10.8)

## 2018-04-09 RX ORDER — SOD SULF/SODIUM/NAHCO3/KCL/PEG
4000 SOLUTION, RECONSTITUTED, ORAL ORAL ONCE
Qty: 0 | Refills: 0 | Status: COMPLETED | OUTPATIENT
Start: 2018-04-09 | End: 2018-04-09

## 2018-04-09 RX ADMIN — GABAPENTIN 100 MILLIGRAM(S): 400 CAPSULE ORAL at 05:49

## 2018-04-09 RX ADMIN — NYSTATIN CREAM 1 APPLICATION(S): 100000 CREAM TOPICAL at 05:51

## 2018-04-09 RX ADMIN — Medication 20 MILLIGRAM(S): at 13:00

## 2018-04-09 RX ADMIN — GABAPENTIN 100 MILLIGRAM(S): 400 CAPSULE ORAL at 13:00

## 2018-04-09 RX ADMIN — Medication 4000 MILLILITER(S): at 13:45

## 2018-04-09 RX ADMIN — Medication 50 MILLIGRAM(S): at 05:49

## 2018-04-09 RX ADMIN — GABAPENTIN 100 MILLIGRAM(S): 400 CAPSULE ORAL at 22:00

## 2018-04-09 RX ADMIN — NYSTATIN CREAM 1 APPLICATION(S): 100000 CREAM TOPICAL at 17:08

## 2018-04-09 RX ADMIN — PANTOPRAZOLE SODIUM 40 MILLIGRAM(S): 20 TABLET, DELAYED RELEASE ORAL at 06:05

## 2018-04-09 NOTE — PROGRESS NOTE ADULT - ASSESSMENT
76 yo female coming from home with PMH of Afib on Xarelto, HTN, Diverticulitis comes to the ED with c/o dark stools and lethargy for 1 day ptp.   In the ED her BP was 90/50 responded to normal saline and became hemodynamically stable. Never had [place] in past.     GI Bleed: likely lower GI bleed, R/O upper GI also  - GI Following: Dr. Boone to scope after 72hrs off Xarelto or earlier if GI bleed continues.  D/w GI fellow today, plan for patient to have endoscopy tomorrow: npo after midnight, start dulcolax, golytely  - Cont Protonix q12   - IV hydration   - Hold Xarelto and ASA  - HB stable at this time   - Transfuse if <7 or if symptomatic   	  Enteritis:   - Doubt clinical significance  - denies diarrhea of far today, had loose stool yesterday x3 - no black stool, no bright red blood     HTN: Hypotension resolved s/p fluid   - Holding home medications now   - Monitor, resume meds when BP becomes hypertensive     Afib: Controlled   - Metoprolol oral 50mg   - Hold xarelto  - If Persistently uncontrolled will need cardio eval.     DVT PPX: sequentials  GI PPX: PPI IV q12   OOB  NPO  Full code 78 yo female coming from home with PMH of Afib on Xarelto, HTN, Diverticulitis comes to the ED with c/o dark stools and lethargy for 1 day ptp.   In the ED her BP was 90/50 responded to normal saline and became hemodynamically stable. Never had [place] in past.     GI Bleed: likely lower GI bleed, R/O upper GI also  - GI Following: Dr. Boone to scope after 72hrs off Xarelto or earlier if GI bleed continues.  D/w GI fellow today, plan for patient to have endoscopy tomorrow: npo after midnight, start dulcolax, golytely  - Cont Protonix q12   - IV hydration   - Hold Xarelto and ASA  - HB stable at this time   - Transfuse if <7 or if symptomatic   	  Enteritis:   - pt afebrile . WBC normal. C.diff -ve.  - planned for colonoscopy  - denies diarrhea so far today, had loose stool yesterday x3 - no black stool, no bright red blood     HTN: Hypotension resolved s/p fluid   - C/w metoprolol  - Monitor BP permits.     Afib: Controlled   - Metoprolol oral 50mg   - Hold xarelto      DVT PPX: sequentials  GI PPX: PPI IV q12   OOB  NPO  Full code

## 2018-04-09 NOTE — PROGRESS NOTE ADULT - SUBJECTIVE AND OBJECTIVE BOX
Patient is a 77y old  Female who presents with a chief complaint of Black stools X 1day  Lethargy X 1 day (07 Apr 2018 12:53)      PAST MEDICAL & SURGICAL HISTORY:  CAD S/P percutaneous coronary angioplasty  Atrial fibrillation  HTN (hypertension)  Diverticulitis  S/P small bowel resection  H/O abdominal hysterectomy      MEDICATIONS  (STANDING):  gabapentin 100 milliGRAM(s) Oral three times a day  metoprolol succinate ER 50 milliGRAM(s) Oral daily  nystatin Cream 1 Application(s) Topical two times a day  pantoprazole    Tablet 40 milliGRAM(s) Oral before breakfast  sodium chloride 0.9%. 1000 milliLiter(s) (75 mL/Hr) IV Continuous <Continuous>    MEDICATIONS  (PRN):  traMADol 50 milliGRAM(s) Oral three times a day PRN Moderate Pain (4 - 6)      Overnight events:    Vital Signs Last 24 Hrs  T(C): 35.7 (09 Apr 2018 14:11), Max: 36.1 (08 Apr 2018 18:45)  T(F): 96.3 (09 Apr 2018 14:11), Max: 97 (08 Apr 2018 18:45)  HR: 82 (09 Apr 2018 14:11) (75 - 122)  BP: 112/65 (09 Apr 2018 14:11) (112/65 - 137/91)  BP(mean): --  RR: 18 (09 Apr 2018 14:11) (17 - 18)  SpO2: 98% (08 Apr 2018 19:03) (98% - 100%)  CAPILLARY BLOOD GLUCOSE        I&O's Summary    08 Apr 2018 07:01  -  09 Apr 2018 07:00  --------------------------------------------------------  IN: 75 mL / OUT: 0 mL / NET: 75 mL    09 Apr 2018 07:01  -  09 Apr 2018 15:13  --------------------------------------------------------  IN: 525 mL / OUT: 0 mL / NET: 525 mL        Physical Exam:    GENERAL: NAD, obese   PULM: cta b/l   HEART: irreg rhythm; no m/r/g  ABDOMEN: soft, NT, +BS  EXTREMITIES: +1-2 non-pitting edema b/l LE, pt notes chronic   PSYCH: AAOx3  NEUROLOGY: non-focal        Labs:                        9.3    5.31  )-----------( 90       ( 09 Apr 2018 07:36 )             28.3             04-09    139  |  105  |  14  ----------------------------<  105<H>  4.1   |  23  |  0.7    Ca    7.7<L>      09 Apr 2018 07:36  Mg     2.1     04-08    TPro  6.2  /  Alb  3.4<L>  /  TBili  0.5  /  DBili  x   /  AST  13  /  ALT  6   /  AlkPhos  86  04-08    LIVER FUNCTIONS - ( 08 Apr 2018 08:14 )  Alb: 3.4 g/dL / Pro: 6.2 g/dL / ALK PHOS: 86 U/L / ALT: 6 U/L / AST: 13 U/L / GGT: x                   CARDIAC MARKERS ( 07 Apr 2018 16:56 )  x     / <0.01 ng/mL / 64 U/L / x     / x                  Imaging:    < from: CT Angio Abdomen and Pelvis w/ IV Cont (04.07.18 @ 07:23) >  Findings compatible with enteritis involving the distal small bowel.    No definite evidence of intravenous contrast extravasation on the   arterial phase of imaging. Patient is a 77y old  Female who presents with a chief complaint of Black stools X 1day  Lethargy X 1 day (07 Apr 2018 12:53)    Denies any complains .  No further episodes of melena    PAST MEDICAL & SURGICAL HISTORY:  CAD S/P percutaneous coronary angioplasty  Atrial fibrillation  HTN (hypertension)  Diverticulitis  S/P small bowel resection  H/O abdominal hysterectomy      MEDICATIONS  (STANDING):  gabapentin 100 milliGRAM(s) Oral three times a day  metoprolol succinate ER 50 milliGRAM(s) Oral daily  nystatin Cream 1 Application(s) Topical two times a day  pantoprazole    Tablet 40 milliGRAM(s) Oral before breakfast  sodium chloride 0.9%. 1000 milliLiter(s) (75 mL/Hr) IV Continuous <Continuous>    MEDICATIONS  (PRN):  traMADol 50 milliGRAM(s) Oral three times a day PRN Moderate Pain (4 - 6)      Overnight events:    Vital Signs Last 24 Hrs  T(C): 35.7 (09 Apr 2018 14:11), Max: 36.1 (08 Apr 2018 18:45)  T(F): 96.3 (09 Apr 2018 14:11), Max: 97 (08 Apr 2018 18:45)  HR: 82 (09 Apr 2018 14:11) (75 - 122)  BP: 112/65 (09 Apr 2018 14:11) (112/65 - 137/91)  BP(mean): --  RR: 18 (09 Apr 2018 14:11) (17 - 18)  SpO2: 98% (08 Apr 2018 19:03) (98% - 100%)  CAPILLARY BLOOD GLUCOSE        I&O's Summary    08 Apr 2018 07:01  -  09 Apr 2018 07:00  --------------------------------------------------------  IN: 75 mL / OUT: 0 mL / NET: 75 mL    09 Apr 2018 07:01  -  09 Apr 2018 15:13  --------------------------------------------------------  IN: 525 mL / OUT: 0 mL / NET: 525 mL        Physical Exam:    GENERAL: NAD, obese   PULM: cta b/l   HEART: irreg rhythm; no m/r/g  ABDOMEN: soft, NT, +BS  EXTREMITIES: +1-2 non-pitting edema b/l LE, pt notes chronic   PSYCH: AAOx3  NEUROLOGY: non-focal        Labs:                        9.3    5.31  )-----------( 90       ( 09 Apr 2018 07:36 )             28.3             04-09    139  |  105  |  14  ----------------------------<  105<H>  4.1   |  23  |  0.7    Ca    7.7<L>      09 Apr 2018 07:36  Mg     2.1     04-08    TPro  6.2  /  Alb  3.4<L>  /  TBili  0.5  /  DBili  x   /  AST  13  /  ALT  6   /  AlkPhos  86  04-08    LIVER FUNCTIONS - ( 08 Apr 2018 08:14 )  Alb: 3.4 g/dL / Pro: 6.2 g/dL / ALK PHOS: 86 U/L / ALT: 6 U/L / AST: 13 U/L / GGT: x                   CARDIAC MARKERS ( 07 Apr 2018 16:56 )  x     / <0.01 ng/mL / 64 U/L / x     / x                  Imaging:    < from: CT Angio Abdomen and Pelvis w/ IV Cont (04.07.18 @ 07:23) >  Findings compatible with enteritis involving the distal small bowel.    No definite evidence of intravenous contrast extravasation on the   arterial phase of imaging.

## 2018-04-10 ENCOUNTER — RESULT REVIEW (OUTPATIENT)
Age: 78
End: 2018-04-10

## 2018-04-10 LAB
ANION GAP SERPL CALC-SCNC: 11 MMOL/L — SIGNIFICANT CHANGE UP (ref 7–14)
BLD GP AB SCN SERPL QL: SIGNIFICANT CHANGE UP
BUN SERPL-MCNC: 7 MG/DL — LOW (ref 10–20)
CALCIUM SERPL-MCNC: 7.9 MG/DL — LOW (ref 8.5–10.1)
CHLORIDE SERPL-SCNC: 108 MMOL/L — SIGNIFICANT CHANGE UP (ref 98–110)
CO2 SERPL-SCNC: 25 MMOL/L — SIGNIFICANT CHANGE UP (ref 17–32)
CREAT SERPL-MCNC: 0.7 MG/DL — SIGNIFICANT CHANGE UP (ref 0.7–1.5)
GLUCOSE SERPL-MCNC: 97 MG/DL — SIGNIFICANT CHANGE UP (ref 70–99)
HCT VFR BLD CALC: 27.6 % — LOW (ref 37–47)
HGB BLD-MCNC: 9.1 G/DL — LOW (ref 12–16)
INR BLD: 1.2 RATIO — SIGNIFICANT CHANGE UP (ref 0.65–1.3)
MCHC RBC-ENTMCNC: 27.6 PG — SIGNIFICANT CHANGE UP (ref 27–31)
MCHC RBC-ENTMCNC: 33 G/DL — SIGNIFICANT CHANGE UP (ref 32–37)
MCV RBC AUTO: 83.6 FL — SIGNIFICANT CHANGE UP (ref 81–99)
NRBC # BLD: 0 /100 WBCS — SIGNIFICANT CHANGE UP (ref 0–0)
PLATELET # BLD AUTO: 151 K/UL — SIGNIFICANT CHANGE UP (ref 130–400)
POTASSIUM SERPL-MCNC: 3.9 MMOL/L — SIGNIFICANT CHANGE UP (ref 3.5–5)
POTASSIUM SERPL-SCNC: 3.9 MMOL/L — SIGNIFICANT CHANGE UP (ref 3.5–5)
PROTHROM AB SERPL-ACNC: 13 SEC — HIGH (ref 9.95–12.87)
RBC # BLD: 3.3 M/UL — LOW (ref 4.2–5.4)
RBC # FLD: 15.6 % — HIGH (ref 11.5–14.5)
SODIUM SERPL-SCNC: 144 MMOL/L — SIGNIFICANT CHANGE UP (ref 135–146)
TYPE + AB SCN PNL BLD: SIGNIFICANT CHANGE UP
WBC # BLD: 6.28 K/UL — SIGNIFICANT CHANGE UP (ref 4.8–10.8)
WBC # FLD AUTO: 6.28 K/UL — SIGNIFICANT CHANGE UP (ref 4.8–10.8)

## 2018-04-10 RX ADMIN — NYSTATIN CREAM 1 APPLICATION(S): 100000 CREAM TOPICAL at 18:16

## 2018-04-10 RX ADMIN — GABAPENTIN 100 MILLIGRAM(S): 400 CAPSULE ORAL at 21:32

## 2018-04-10 RX ADMIN — GABAPENTIN 100 MILLIGRAM(S): 400 CAPSULE ORAL at 06:27

## 2018-04-10 RX ADMIN — Medication 50 MILLIGRAM(S): at 06:27

## 2018-04-10 RX ADMIN — NYSTATIN CREAM 1 APPLICATION(S): 100000 CREAM TOPICAL at 06:27

## 2018-04-10 NOTE — PRE-ANESTHESIA EVALUATION ADULT - MALLAMPATI CLASS
upper denture/Class I (easy) - visualization of the soft palate, fauces, uvula, and both anterior and posterior pillars

## 2018-04-10 NOTE — PROGRESS NOTE ADULT - ASSESSMENT
76 yo female coming from home, with PMH  including Diverticultis, Afib on xarelto, post herpetic neuralgia taking NSAIDs, CAD on ASA, was doing fine until yesterday when she felt lower abdominal cramps followed by very dark bowel movement, semi solid in nature, no blood seen. She had total of 6 episodes, with the last 3 of them were brown but she started feeling dizzy so she decided to come tot the ED.   In the ED BP was 90/60 HR 84/min, was given NS 1L, she responded to fluids. As of now she is c/o cramps and last BM was 12 hrs ago. (07 Apr 2018 12:53)  76 yo female coming from home with PMH of Afib on Xarelto, HTN, Diverticulitis comes to the ED with c/o dark stools and lethargy for 1 day ptp.   In the ED her BP was 90/50 responded to normal saline and became hemodynamically stable. Never had [place] in past.     1.GI Bleed  - GI Following.  -endoscopy today.  - Cont Protonix q12   - IV hydration   - Hold Xarelto and ASA  - HB stable at this time   - Transfuse if <7 or if symptomatic   	  2.Enteritis:   - pt afebrile . WBC normal. C.diff -ve.  - planned for colonoscopy    3.HTN: Hypotension resolved s/p fluid   - C/w metoprolol  - Monitor BP.    4.Afib: Controlled   - Metoprolol oral 50mg   - Hold xarelto      DVT PPX: sequentials  GI PPX: PPI IV q12

## 2018-04-10 NOTE — PROGRESS NOTE ADULT - SUBJECTIVE AND OBJECTIVE BOX
Patient is a 77y old  Female who presents with a chief complaint of Black stools X 1day  Lethargy X 1 day (07 Apr 2018 12:53)    Patient was seen and examined.  Denies any complains .  No further episodes of melena    PAST MEDICAL & SURGICAL HISTORY:  CAD S/P percutaneous coronary angioplasty  Atrial fibrillation  HTN (hypertension)  Diverticulitis  S/P small bowel resection  H/O abdominal hysterectomy    Allergies  penicillin (Anaphylaxis)  penicillin (Unknown)    MEDICATIONS  (STANDING):  gabapentin 100 milliGRAM(s) Oral three times a day  metoprolol succinate ER 50 milliGRAM(s) Oral daily  nystatin Cream 1 Application(s) Topical two times a day  pantoprazole    Tablet 40 milliGRAM(s) Oral before breakfast  sodium chloride 0.9%. 1000 milliLiter(s) (75 mL/Hr) IV Continuous <Continuous>    MEDICATIONS  (PRN):  traMADol 50 milliGRAM(s) Oral three times a day PRN Moderate Pain (4 - 6)    Vital Signs Last 24 Hrs  T(C): 36.3  T(F): 97.4  HR: 100  BP: 129/90  BP(mean): --  RR: 12  SpO2: --  O/E:  Awake, alert, not in distress.  HEENT: atraumatic, EOMI.  Chest: clear.  CVS: SIS2 +, irregular. no murmur.  P/A: Soft, BS+  CNS: non focal.  Ext: mild edema feet B/l  Skin: no rash, no ulcers.  All systems reviewed positive findings as above.                       9.1<L>  6.28  )-----------( 151      ( 10 Apr 2018 09:27 )             27.6<L>                        9.3<L>  5.31  )-----------( 90<L>    ( 09 Apr 2018 07:36 )             28.3<L>    04-10    144  |  108  |  7<L>  ----------------------------<  97  3.9   |  25  |  0.7  04-09    139  |  105  |  14  ----------------------------<  105<H>  4.1   |  23  |  0.7    Ca    7.9<L>      10 Apr 2018 09:27  Ca    7.7<L>      09 Apr 2018 07:36    PT/INR - ( 10 Apr 2018 09:27 )   PT: 13.00 sec;   INR: 1.20 ratio

## 2018-04-10 NOTE — ASU PREOP CHECKLIST - TO WHOM
Patient notified the prescription is ready to be picked up at the [facility] office.  Prescription will be picked up by Patient. and Spouse..     intra op rn

## 2018-04-10 NOTE — PROGRESS NOTE ADULT - SUBJECTIVE AND OBJECTIVE BOX
Patient is a 77y old  Female who presents with a chief complaint of Black stools X 1day  Lethargy X 1 day (07 Apr 2018 12:53)      PAST MEDICAL & SURGICAL HISTORY:  CAD S/P percutaneous coronary angioplasty  Atrial fibrillation  HTN (hypertension)  Diverticulitis  S/P small bowel resection  H/O abdominal hysterectomy      MEDICATIONS  (STANDING):  gabapentin 100 milliGRAM(s) Oral three times a day  metoprolol succinate ER 50 milliGRAM(s) Oral daily  nystatin Cream 1 Application(s) Topical two times a day  pantoprazole    Tablet 40 milliGRAM(s) Oral before breakfast  sodium chloride 0.9%. 1000 milliLiter(s) (75 mL/Hr) IV Continuous <Continuous>    MEDICATIONS  (PRN):  traMADol 50 milliGRAM(s) Oral three times a day PRN Moderate Pain (4 - 6)      Overnight events:    Vital Signs Last 24 Hrs  T(C): 35.8 (10 Apr 2018 20:11), Max: 36.3 (10 Apr 2018 12:08)  T(F): 96.4 (10 Apr 2018 20:11), Max: 97.4 (10 Apr 2018 12:08)  HR: 89 (10 Apr 2018 20:11) (74 - 100)  BP: 137/61 (10 Apr 2018 20:11) (109/58 - 160/68)  BP(mean): --  RR: 19 (10 Apr 2018 20:11) (12 - 19)  SpO2: 99% (10 Apr 2018 14:32) (98% - 100%)  CAPILLARY BLOOD GLUCOSE        I&O's Summary    09 Apr 2018 07:01  -  10 Apr 2018 07:00  --------------------------------------------------------  IN: 1500 mL / OUT: 0 mL / NET: 1500 mL        Physical Exam:    GENERAL: NAD, obese   PULM: cta b/l   HEART: irreg rhythm; no m/r/g  ABDOMEN: soft, NT, +BS  EXTREMITIES: +1-2 non-pitting edema b/l LE, pt notes chronic   PSYCH: AAOx3  NEUROLOGY: non-focal        Labs:                        9.1    6.28  )-----------( 151      ( 10 Apr 2018 09:27 )             27.6             04-10    144  |  108  |  7<L>  ----------------------------<  97  3.9   |  25  |  0.7    Ca    7.9<L>      10 Apr 2018 09:27              PT/INR - ( 10 Apr 2018 09:27 )   PT: 13.00 sec;   INR: 1.20 ratio                       Imaging:  Findings compatible with enteritis involving the distal small bowel.    No definite evidence of intravenous contrast extravasation on the   arterial phase of imaging.

## 2018-04-11 ENCOUNTER — TRANSCRIPTION ENCOUNTER (OUTPATIENT)
Age: 78
End: 2018-04-11

## 2018-04-11 VITALS
DIASTOLIC BLOOD PRESSURE: 60 MMHG | RESPIRATION RATE: 18 BRPM | TEMPERATURE: 96 F | HEART RATE: 88 BPM | SYSTOLIC BLOOD PRESSURE: 135 MMHG

## 2018-04-11 LAB
ANION GAP SERPL CALC-SCNC: 11 MMOL/L — SIGNIFICANT CHANGE UP (ref 7–14)
BUN SERPL-MCNC: 8 MG/DL — LOW (ref 10–20)
CALCIUM SERPL-MCNC: 7.8 MG/DL — LOW (ref 8.5–10.1)
CHLORIDE SERPL-SCNC: 105 MMOL/L — SIGNIFICANT CHANGE UP (ref 98–110)
CO2 SERPL-SCNC: 24 MMOL/L — SIGNIFICANT CHANGE UP (ref 17–32)
CREAT SERPL-MCNC: 0.7 MG/DL — SIGNIFICANT CHANGE UP (ref 0.7–1.5)
GLUCOSE SERPL-MCNC: 99 MG/DL — SIGNIFICANT CHANGE UP (ref 70–99)
HCT VFR BLD CALC: 25.5 % — LOW (ref 37–47)
HGB BLD-MCNC: 8.3 G/DL — LOW (ref 12–16)
MCHC RBC-ENTMCNC: 26.8 PG — LOW (ref 27–31)
MCHC RBC-ENTMCNC: 32.5 G/DL — SIGNIFICANT CHANGE UP (ref 32–37)
MCV RBC AUTO: 82.3 FL — SIGNIFICANT CHANGE UP (ref 81–99)
NRBC # BLD: 0 /100 WBCS — SIGNIFICANT CHANGE UP (ref 0–0)
PLATELET # BLD AUTO: 150 K/UL — SIGNIFICANT CHANGE UP (ref 130–400)
POTASSIUM SERPL-MCNC: 3.4 MMOL/L — LOW (ref 3.5–5)
POTASSIUM SERPL-SCNC: 3.4 MMOL/L — LOW (ref 3.5–5)
RBC # BLD: 3.1 M/UL — LOW (ref 4.2–5.4)
RBC # FLD: 15.8 % — HIGH (ref 11.5–14.5)
SODIUM SERPL-SCNC: 140 MMOL/L — SIGNIFICANT CHANGE UP (ref 135–146)
SURGICAL PATHOLOGY STUDY: SIGNIFICANT CHANGE UP
WBC # BLD: 6.02 K/UL — SIGNIFICANT CHANGE UP (ref 4.8–10.8)
WBC # FLD AUTO: 6.02 K/UL — SIGNIFICANT CHANGE UP (ref 4.8–10.8)

## 2018-04-11 RX ORDER — RIVAROXABAN 15 MG-20MG
20 KIT ORAL
Qty: 0 | Refills: 0 | COMMUNITY

## 2018-04-11 RX ORDER — NYSTATIN CREAM 100000 [USP'U]/G
1 CREAM TOPICAL
Qty: 1 | Refills: 0
Start: 2018-04-11 | End: 2018-04-13

## 2018-04-11 RX ORDER — PANTOPRAZOLE SODIUM 20 MG/1
1 TABLET, DELAYED RELEASE ORAL
Qty: 60 | Refills: 0
Start: 2018-04-11 | End: 2018-05-10

## 2018-04-11 RX ORDER — RIVAROXABAN 15 MG-20MG
0 KIT ORAL
Qty: 0 | Refills: 0 | COMMUNITY

## 2018-04-11 RX ORDER — METOPROLOL TARTRATE 50 MG
0 TABLET ORAL
Qty: 0 | Refills: 0 | COMMUNITY

## 2018-04-11 RX ORDER — GABAPENTIN 400 MG/1
1 CAPSULE ORAL
Qty: 0 | Refills: 0 | DISCHARGE
Start: 2018-04-11

## 2018-04-11 RX ORDER — PANTOPRAZOLE SODIUM 20 MG/1
1 TABLET, DELAYED RELEASE ORAL
Qty: 30 | Refills: 0 | OUTPATIENT
Start: 2018-04-11 | End: 2018-05-10

## 2018-04-11 RX ORDER — METOPROLOL TARTRATE 50 MG
1 TABLET ORAL
Qty: 0 | Refills: 0 | DISCHARGE
Start: 2018-04-11

## 2018-04-11 RX ADMIN — NYSTATIN CREAM 1 APPLICATION(S): 100000 CREAM TOPICAL at 06:44

## 2018-04-11 RX ADMIN — Medication 50 MILLIGRAM(S): at 06:44

## 2018-04-11 RX ADMIN — GABAPENTIN 100 MILLIGRAM(S): 400 CAPSULE ORAL at 14:28

## 2018-04-11 RX ADMIN — PANTOPRAZOLE SODIUM 40 MILLIGRAM(S): 20 TABLET, DELAYED RELEASE ORAL at 06:44

## 2018-04-11 RX ADMIN — GABAPENTIN 100 MILLIGRAM(S): 400 CAPSULE ORAL at 06:44

## 2018-04-11 RX ADMIN — SODIUM CHLORIDE 75 MILLILITER(S): 9 INJECTION INTRAMUSCULAR; INTRAVENOUS; SUBCUTANEOUS at 06:45

## 2018-04-11 NOTE — DISCHARGE NOTE ADULT - PLAN OF CARE
resolution follow up biopsy results in GI clinic  continue regular diet clinical stability continue with metoprolol  continue xarelto  follow up with primary care doctor follow up biopsy results in GI clinic  continue regular diet  continue PPI (pantoprozole)

## 2018-04-11 NOTE — PROGRESS NOTE ADULT - SUBJECTIVE AND OBJECTIVE BOX
Patient is a 77y old  Female who presents with a chief complaint of Black stools X 1day  Lethargy X 1 day (11 Apr 2018 11:32)      PAST MEDICAL & SURGICAL HISTORY:  CAD S/P percutaneous coronary angioplasty  Atrial fibrillation  HTN (hypertension)  Diverticulitis  S/P small bowel resection  H/O abdominal hysterectomy      MEDICATIONS  (STANDING):  gabapentin 100 milliGRAM(s) Oral three times a day  metoprolol succinate ER 50 milliGRAM(s) Oral daily  nystatin Cream 1 Application(s) Topical two times a day  pantoprazole    Tablet 40 milliGRAM(s) Oral before breakfast    MEDICATIONS  (PRN):  traMADol 50 milliGRAM(s) Oral three times a day PRN Moderate Pain (4 - 6)      Overnight events:    Vital Signs Last 24 Hrs  T(C): 35.8 (11 Apr 2018 12:36), Max: 36.3 (11 Apr 2018 05:37)  T(F): 96.5 (11 Apr 2018 12:36), Max: 97.3 (11 Apr 2018 05:37)  HR: 88 (11 Apr 2018 12:36) (75 - 89)  BP: 135/60 (11 Apr 2018 12:36) (123/74 - 138/71)  BP(mean): --  RR: 18 (11 Apr 2018 12:36) (18 - 19)  SpO2: 97% (11 Apr 2018 09:34) (97% - 97%)  CAPILLARY BLOOD GLUCOSE        I&O's Summary    10 Apr 2018 07:01  -  11 Apr 2018 07:00  --------------------------------------------------------  IN: 75 mL / OUT: 0 mL / NET: 75 mL        Physical Exam:    GENERAL: NAD, obese   PULM: cta b/l   HEART: irreg rhythm; no m/r/g  ABDOMEN: soft, NT, +BS  EXTREMITIES: +1-2 non-pitting edema b/l LE, pt notes chronic   PSYCH: AAOx3  NEUROLOGY: non-focal        Labs:                        8.3    6.02  )-----------( 150      ( 11 Apr 2018 07:19 )             25.5             04-11    140  |  105  |  8<L>  ----------------------------<  99  3.4<L>   |  24  |  0.7    Ca    7.8<L>      11 Apr 2018 07:19              PT/INR - ( 10 Apr 2018 09:27 )   PT: 13.00 sec;   INR: 1.20 ratio                       Imaging:  Findings compatible with enteritis involving the distal small bowel.    No definite evidence of intravenous contrast extravasation on the   arterial phase of imaging.      ECG:

## 2018-04-11 NOTE — PROGRESS NOTE ADULT - SUBJECTIVE AND OBJECTIVE BOX
INTERVAL HPI/OVERNIGHT EVENTS:    78 yo F PMH of A-Fib on Xarelto, HTN, CAD sp PCI, stenting 9 yrs ago on ASA presented with 1  day of weakness and watery diarrhea described as black stools. Pt also c/o mild cramping pain in the abdomen but no N/V, fever,  changes in weight or appetite. Pt never had EGD or colonoscopy done in the past. But does states that she had a part of small bowel removed 30 yrs dur to ? nodules in the bowel.  Last dose of Xarelto was yesterday in morning and last BM was last night, more than 12 hrs ago. Pt in rapid A Fib in ER but BP and Hb normal.	     tolerating diet, no bm overnight     ROS wnl     PAST MEDICAL & SURGICAL HISTORY:  CAD S/P percutaneous coronary angioplasty  Atrial fibrillation  HTN (hypertension)  Diverticulitis  S/P small bowel resection  H/O abdominal hysterectomy      Home Medications:  aspirin 81 mg oral tablet, chewable: 1 tab(s) orally once a day (2018 05:35)  metoprolol: orally once a day (2018 05:35)  Xarelto:  (2018 05:35)      MEDICATIONS  (STANDING):  gabapentin 100 milliGRAM(s) Oral three times a day  metoprolol succinate ER 50 milliGRAM(s) Oral daily  nystatin Cream 1 Application(s) Topical two times a day  pantoprazole    Tablet 40 milliGRAM(s) Oral before breakfast    MEDICATIONS  (PRN):  traMADol 50 milliGRAM(s) Oral three times a day PRN Moderate Pain (4 - 6)      Allergies    penicillin (Anaphylaxis)  penicillin (Unknown)    Intolerances            PHYSICAL EXAM:   Vital Signs:  Vital Signs Last 24 Hrs  T(C): 36.3 (2018 05:37), Max: 36.3 (10 Apr 2018 12:08)  T(F): 97.3 (2018 05:37), Max: 97.4 (10 Apr 2018 12:08)  HR: 82 (2018 05:37) (74 - 100)  BP: 123/74 (2018 05:37) (109/58 - 160/68)  BP(mean): --  RR: 18 (2018 05:37) (12 - 19)  SpO2: 97% (2018 09:34) (97% - 100%)  Daily Height in cm: 167.64 (10 Apr 2018 12:16)    Daily Weight in k.5 (2018 05:37)    GENERAL:  no distress  HEENT:  NC/AT,  anicteric  CHEST:   no increased effort, breath sounds clear  HEART:  Regular rhythm  ABDOMEN:  Soft, non-tender, non-distended, normoactive bowel sounds,  no masses ,no hepato-splenomegaly, no signs of chronic liver disease  EXTEREMITIES:  no cyanosis      LABS:                        8.3    6.02  )-----------( 150      ( 2018 07:19 )             25.5       Hemoglobin: 8.3 g/dL (18 @ 07:19)  Hemoglobin: 9.1 g/dL (04-10-18 @ 09:27)  Hemoglobin: 9.3 g/dL (18 @ 07:36)          140  |  105  |  8<L>  ----------------------------<  99  3.4<L>   |  24  |  0.7    Ca    7.8<L>      2018 07:19        INR: 1.20 ratio (04-10-18 @ 09:27)                RADIOLOGY & ADDITIONAL TESTS:

## 2018-04-11 NOTE — PROGRESS NOTE ADULT - ASSESSMENT
78 yo F PMH of A-Fib on Xarelto, HTN, CAD sp PCI, stenting 9 yrs ago on ASA presented with 1  day of weakness and watery diarrhea described as black stools and cramping abdopminal pain and was found to have enteritis on CT abdomen , no drop in Hb and hemodynamically stable.    *GI Bleed :  egd showing antral ulcers and multiple masses in the gastric body   will f/u biopsies results  cbc q12h   PPI IV Q12H  needs egd in 2 months for documentation of cure of gastric ulcers  can restart xarelto as long as hb stable and no signs of active gi bleed 78 yo F PMH of A-Fib on Xarelto, HTN, CAD sp PCI, stenting 9 yrs ago on ASA presented with 1  day of weakness and watery diarrhea described as black stools and cramping abdopminal pain and was found to have enteritis on CT abdomen , no drop in Hb and hemodynamically stable.    *GI Bleed :  egd showing antral ulcers and multiple masses in the gastric body   will f/u biopsies results  cbc q12h   PPI IV Q12H  needs egd in 2 months for documentation of cure of gastric ulcers  can restart xarelto after 3 more days as long as hb stable and no signs of active gi bleed

## 2018-04-11 NOTE — DISCHARGE NOTE ADULT - VISION (WITH CORRECTIVE LENSES IF THE PATIENT USUALLY WEARS THEM):
contact lenses in/Partially impaired: cannot see medication labels or newsprint, but can see obstacles in path, and the surrounding layout; can count fingers at arm's length

## 2018-04-11 NOTE — PROGRESS NOTE ADULT - PROVIDER SPECIALTY LIST ADULT
Gastroenterology
Internal Medicine
Hospitalist

## 2018-04-11 NOTE — DISCHARGE NOTE ADULT - HOSPITAL COURSE
admitted with six episodes of dark stool, on xarelto  Initial Hb 12.5, was 9.1 prior to endoscopy  in hospital did not have further dark stool, asymptomatic   EGD: ulcerated wall masses, biopsies taken, duodenitis  colonoscopy: three polyps removed, mild diverticulosis in sigmoid colon, internal hemorrhoids  GI advised can resume regular diet and f/u biopsy results in clinic admitted with six episodes of dark stool, on xarelto  Initial Hb 12.5, was 9.1 prior to endoscopy.  Hemodynmically stable throughout hospital stay   in hospital did not have further dark stool, asymptomatic   EGD: ulcerated wall masses, biopsies taken, duodenitis  colonoscopy: three polyps removed, mild diverticulosis in sigmoid colon, internal hemorrhoids  GI advised can resume regular diet and f/u biopsy results in clinic admitted with six episodes of dark stool, on xarelto  Initial Hb 12.5, was 9.1 prior to endoscopy.  Hemodynmically stable throughout hospital stay   in hospital did not have further dark stool, asymptomatic   EGD: ulcerated wall masses, biopsies taken, duodenitis  colonoscopy: three polyps removed, mild diverticulosis in sigmoid colon, internal hemorrhoids  GI advised can resume regular diet and f/u biopsy results in clinic   start xarelto again on 4/15/18 to allow ulcers to heal  if bleeding restarts stop xarelto and seek medical attention

## 2018-04-11 NOTE — DISCHARGE NOTE ADULT - CARE PROVIDERS DIRECT ADDRESSES
[email or web address]

## 2018-04-11 NOTE — DISCHARGE NOTE ADULT - PATIENT PORTAL LINK FT
You can access the [facility] Patient Portal, offered by [facility], by registering with the following website: [email or web address]

## 2018-04-11 NOTE — PROGRESS NOTE ADULT - ASSESSMENT
78 yo female coming from home, with PMH  including Diverticultis, Afib on xarelto, post herpetic neuralgia taking NSAIDs, CAD on ASA, was doing fine until yesterday when she felt lower abdominal cramps followed by very dark bowel movement, semi solid in nature, no blood seen. She had total of 6 episodes, with the last 3 of them were brown but she started feeling dizzy so she decided to come tot the ED.   In the ED BP was 90/60 HR 84/min, was given NS 1L, she responded to fluids. As of now she is c/o cramps and last BM was 12 hrs ago. (07 Apr 2018 12:53)  78 yo female coming from home with PMH of Afib on Xarelto, HTN, Diverticulitis comes to the ED with c/o dark stools and lethargy for 1 day ptp.   In the ED her BP was 90/50 responded to normal saline and became hemodynamically stable. Never had [place] in past.     1.GI Bleed  - GI Following.  -f/u biopsies from EGD and colonoscopy as outpatient   - PPI q12    - Hold Xarelto and ASA restart on 4/15/18 (off for one week total to allow ulcer healing, d/w Dr. Irene and Dr. Zhang).  Advised patient to not take these medications until 4/15/18 and if dark stool or blood noticed in stool she should stop these medications and seek medication attention   - HB stable at this time     	  2.Enteritis:   - pt afebrile . WBC normal. C.diff -ve.  - planned for colonoscopy    3.HTN: Hypotension resolved s/p fluid   - C/w metoprolol  - Monitor BP.    4.Afib: Controlled   - Metoprolol oral 50mg   - Hold xarelto      DVT PPX: sequentials

## 2018-04-11 NOTE — DISCHARGE NOTE ADULT - CARE PLAN
Principal Discharge DX:	GI bleed  Goal:	resolution  Assessment and plan of treatment:	follow up biopsy results in GI clinic  continue regular diet Principal Discharge DX:	GI bleed  Goal:	resolution  Assessment and plan of treatment:	follow up biopsy results in GI clinic  continue regular diet  Secondary Diagnosis:	Atrial fibrillation  Goal:	clinical stability  Assessment and plan of treatment:	continue with metoprolol  continue xarelto  follow up with primary care doctor Principal Discharge DX:	GI bleed  Goal:	resolution  Assessment and plan of treatment:	follow up biopsy results in GI clinic  continue regular diet  continue PPI (pantoprozole)  Secondary Diagnosis:	Atrial fibrillation  Goal:	clinical stability  Assessment and plan of treatment:	continue with metoprolol  continue xarelto  follow up with primary care doctor

## 2018-04-11 NOTE — DISCHARGE NOTE ADULT - CARE PROVIDER_API CALL
Jered Irene), Gastroenterology; Internal Medicine  [address] [place] [address]  Phone: [phone]  Fax: [phone]    Erich Lopez (), Internal Medicine  [address] [facility]  [address]  [place] [address]  Phone: [phone]  Fax: [phone]

## 2018-04-12 DIAGNOSIS — Z90.710 ACQUIRED ABSENCE OF BOTH CERVIX AND UTERUS: ICD-10-CM

## 2018-04-12 DIAGNOSIS — K29.80 DUODENITIS WITHOUT BLEEDING: ICD-10-CM

## 2018-04-12 DIAGNOSIS — Z88.0 ALLERGY STATUS TO PENICILLIN: ICD-10-CM

## 2018-04-12 DIAGNOSIS — Z79.01 LONG TERM (CURRENT) USE OF ANTICOAGULANTS: ICD-10-CM

## 2018-04-12 DIAGNOSIS — K62.1 RECTAL POLYP: ICD-10-CM

## 2018-04-12 DIAGNOSIS — B02.29 OTHER POSTHERPETIC NERVOUS SYSTEM INVOLVEMENT: ICD-10-CM

## 2018-04-12 DIAGNOSIS — K57.90 DIVERTICULOSIS OF INTESTINE, PART UNSPECIFIED, WITHOUT PERFORATION OR ABSCESS WITHOUT BLEEDING: ICD-10-CM

## 2018-04-12 DIAGNOSIS — K63.5 POLYP OF COLON: ICD-10-CM

## 2018-04-12 DIAGNOSIS — K52.9 NONINFECTIVE GASTROENTERITIS AND COLITIS, UNSPECIFIED: ICD-10-CM

## 2018-04-12 DIAGNOSIS — Z95.5 PRESENCE OF CORONARY ANGIOPLASTY IMPLANT AND GRAFT: ICD-10-CM

## 2018-04-12 DIAGNOSIS — K64.8 OTHER HEMORRHOIDS: ICD-10-CM

## 2018-04-12 DIAGNOSIS — Z90.49 ACQUIRED ABSENCE OF OTHER SPECIFIED PARTS OF DIGESTIVE TRACT: ICD-10-CM

## 2018-04-12 DIAGNOSIS — I25.10 ATHEROSCLEROTIC HEART DISEASE OF NATIVE CORONARY ARTERY WITHOUT ANGINA PECTORIS: ICD-10-CM

## 2018-04-12 DIAGNOSIS — I48.91 UNSPECIFIED ATRIAL FIBRILLATION: ICD-10-CM

## 2018-04-12 DIAGNOSIS — K25.4 CHRONIC OR UNSPECIFIED GASTRIC ULCER WITH HEMORRHAGE: ICD-10-CM

## 2018-04-12 DIAGNOSIS — I10 ESSENTIAL (PRIMARY) HYPERTENSION: ICD-10-CM

## 2018-04-12 DIAGNOSIS — K92.2 GASTROINTESTINAL HEMORRHAGE, UNSPECIFIED: ICD-10-CM

## 2018-04-13 NOTE — CHART NOTE - NSCHARTNOTEFT_GEN_A_CORE
Spoke with Dr Wilson, of Pathology, who advised that gastric biopsies are consistent with Lymphoma.    I was unable to contact the patient by phone and called her next of kin, her Daughter Joyce Ny to advise that the biopsies were of great concern, apparently consistent with lymphoma, and she needed to bring the patient to my office on [facility] on Tuesday . She will need close medical followup and oncology evaluation .

## 2018-04-15 RX ORDER — RIVAROXABAN 15 MG-20MG
20 KIT ORAL
Qty: 0 | Refills: 0 | DISCHARGE
Start: 2018-04-15

## 2018-04-17 LAB — SURGICAL PATHOLOGY STUDY: SIGNIFICANT CHANGE UP

## 2018-04-25 PROBLEM — I48.91 UNSPECIFIED ATRIAL FIBRILLATION: Chronic | Status: ACTIVE | Noted: 2018-04-07

## 2018-04-25 PROBLEM — K57.92 DIVERTICULITIS OF INTESTINE, PART UNSPECIFIED, WITHOUT PERFORATION OR ABSCESS WITHOUT BLEEDING: Chronic | Status: ACTIVE | Noted: 2018-04-07

## 2018-04-25 PROBLEM — I10 ESSENTIAL (PRIMARY) HYPERTENSION: Chronic | Status: ACTIVE | Noted: 2018-04-07

## 2018-04-30 ENCOUNTER — OUTPATIENT (OUTPATIENT)
Dept: OUTPATIENT SERVICES | Facility: HOSPITAL | Age: 78
LOS: 1 days | Discharge: HOME | End: 2018-04-30

## 2018-04-30 DIAGNOSIS — C85.90 NON-HODGKIN LYMPHOMA, UNSPECIFIED, UNSPECIFIED SITE: ICD-10-CM

## 2018-04-30 DIAGNOSIS — Z98.890 OTHER SPECIFIED POSTPROCEDURAL STATES: Chronic | ICD-10-CM

## 2018-04-30 DIAGNOSIS — Z90.49 ACQUIRED ABSENCE OF OTHER SPECIFIED PARTS OF DIGESTIVE TRACT: Chronic | ICD-10-CM

## 2018-05-01 ENCOUNTER — OUTPATIENT (OUTPATIENT)
Dept: OUTPATIENT SERVICES | Facility: HOSPITAL | Age: 78
LOS: 1 days | Discharge: HOME | End: 2018-05-01

## 2018-05-01 DIAGNOSIS — Z98.890 OTHER SPECIFIED POSTPROCEDURAL STATES: Chronic | ICD-10-CM

## 2018-05-01 DIAGNOSIS — Z90.49 ACQUIRED ABSENCE OF OTHER SPECIFIED PARTS OF DIGESTIVE TRACT: Chronic | ICD-10-CM

## 2018-05-01 DIAGNOSIS — C85.90 NON-HODGKIN LYMPHOMA, UNSPECIFIED, UNSPECIFIED SITE: ICD-10-CM

## 2018-05-04 ENCOUNTER — LABORATORY RESULT (OUTPATIENT)
Age: 78
End: 2018-05-04

## 2018-05-04 ENCOUNTER — APPOINTMENT (OUTPATIENT)
Dept: HEMATOLOGY ONCOLOGY | Facility: CLINIC | Age: 78
End: 2018-05-04

## 2018-05-04 VITALS
TEMPERATURE: 96 F | RESPIRATION RATE: 14 BRPM | HEIGHT: 66 IN | HEART RATE: 117 BPM | WEIGHT: 210 LBS | DIASTOLIC BLOOD PRESSURE: 66 MMHG | SYSTOLIC BLOOD PRESSURE: 139 MMHG | BODY MASS INDEX: 33.75 KG/M2

## 2018-05-04 DIAGNOSIS — I25.10 ATHEROSCLEROTIC HEART DISEASE OF NATIVE CORONARY ARTERY W/OUT ANGINA PECTORIS: ICD-10-CM

## 2018-05-04 DIAGNOSIS — I10 ESSENTIAL (PRIMARY) HYPERTENSION: ICD-10-CM

## 2018-05-04 DIAGNOSIS — Z87.891 PERSONAL HISTORY OF NICOTINE DEPENDENCE: ICD-10-CM

## 2018-05-04 DIAGNOSIS — Z86.19 PERSONAL HISTORY OF OTHER INFECTIOUS AND PARASITIC DISEASES: ICD-10-CM

## 2018-05-04 LAB
HCT VFR BLD CALC: 34.3 %
HGB BLD-MCNC: 10.8 G/DL
MCHC RBC-ENTMCNC: 26.5 PG
MCHC RBC-ENTMCNC: 31.5 G/DL
MCV RBC AUTO: 84.3 FL
PLATELET # BLD AUTO: 253 K/UL
PMV BLD: 12.2 FL
RBC # BLD: 4.07 M/UL
RBC # FLD: 15.4 %
RETICS # AUTO: 2 %
RETICS AGGREG/RBC NFR: 81.2 K/UL
WBC # FLD AUTO: 6.97 K/UL

## 2018-05-07 LAB
ALBUMIN MFR SERPL ELPH: 49 %
ALBUMIN SERPL ELPH-MCNC: 3.8 G/DL
ALBUMIN SERPL-MCNC: 3.5 G/DL
ALBUMIN/GLOB SERPL: 0.9 RATIO
ALP BLD-CCNC: 135 U/L
ALPHA1 GLOB MFR SERPL ELPH: 5.5 %
ALPHA1 GLOB SERPL ELPH-MCNC: 0.4 G/DL
ALPHA2 GLOB MFR SERPL ELPH: 13 %
ALPHA2 GLOB SERPL ELPH-MCNC: 0.9 G/DL
ALT SERPL-CCNC: 11 U/L
ANION GAP SERPL CALC-SCNC: 17 MMOL/L
AST SERPL-CCNC: 20 U/L
B-GLOBULIN MFR SERPL ELPH: 14.7 %
B-GLOBULIN SERPL ELPH-MCNC: 1.1 G/DL
B2 MICROGLOB SERPL-MCNC: 4.8 MG/L
BILIRUB SERPL-MCNC: 0.5 MG/DL
BUN SERPL-MCNC: 12 MG/DL
CALCIUM SERPL-MCNC: 9.9 MG/DL
CHLORIDE SERPL-SCNC: 102 MMOL/L
CO2 SERPL-SCNC: 21 MMOL/L
CREAT SERPL-MCNC: 1 MG/DL
FERRITIN SERPL-MCNC: 18 NG/ML
GAMMA GLOB FLD ELPH-MCNC: 1.3 G/DL
GAMMA GLOB MFR SERPL ELPH: 17.8 %
GLUCOSE SERPL-MCNC: 104 MG/DL
HBV CORE IGG+IGM SER QL: NONREACTIVE
HBV SURFACE AB SERPL IA-ACNC: <3 MIU/ML
HBV SURFACE AG SER QL: NONREACTIVE
HCV AB SER QL: NONREACTIVE
HCV S/CO RATIO: 0.11 S/CO
HIV1+2 AB SPEC QL IA.RAPID: NONREACTIVE
IGG SER QL IEP: 1060 MG/DL
INTERPRETATION SERPL IEP-IMP: NORMAL
LDH SERPL-CCNC: 197
M PROTEIN MFR SERPL ELPH: NORMAL %
MONOCLON BAND OBS SERPL: NORMAL G/DL
POTASSIUM SERPL-SCNC: 4.7 MMOL/L
PROT SERPL-MCNC: 7.2 G/DL
SODIUM SERPL-SCNC: 140 MMOL/L
VIT B12 SERPL-MCNC: 662 PG/ML

## 2018-05-09 LAB
H PYLORI AB SER-ACNC: 8.4 UNITS
H PYLORI IGA SER-ACNC: 28.1 UNITS

## 2018-05-16 ENCOUNTER — OUTPATIENT (OUTPATIENT)
Dept: OUTPATIENT SERVICES | Facility: HOSPITAL | Age: 78
LOS: 1 days | Discharge: HOME | End: 2018-05-16

## 2018-05-16 DIAGNOSIS — C88.4 EXTRANODAL MARGINAL ZONE B-CELL LYMPHOMA OF MUCOSA-ASSOCIATED LYMPHOID TISSUE [MALT-LYMPHOMA]: ICD-10-CM

## 2018-05-16 DIAGNOSIS — Z90.49 ACQUIRED ABSENCE OF OTHER SPECIFIED PARTS OF DIGESTIVE TRACT: Chronic | ICD-10-CM

## 2018-05-16 DIAGNOSIS — Z98.890 OTHER SPECIFIED POSTPROCEDURAL STATES: Chronic | ICD-10-CM

## 2018-05-24 ENCOUNTER — LABORATORY RESULT (OUTPATIENT)
Age: 78
End: 2018-05-24

## 2018-05-24 ENCOUNTER — APPOINTMENT (OUTPATIENT)
Dept: HEMATOLOGY ONCOLOGY | Facility: CLINIC | Age: 78
End: 2018-05-24

## 2018-05-24 ENCOUNTER — OUTPATIENT (OUTPATIENT)
Dept: OUTPATIENT SERVICES | Facility: HOSPITAL | Age: 78
LOS: 1 days | Discharge: HOME | End: 2018-05-24

## 2018-05-24 VITALS
DIASTOLIC BLOOD PRESSURE: 70 MMHG | BODY MASS INDEX: 33.75 KG/M2 | SYSTOLIC BLOOD PRESSURE: 109 MMHG | WEIGHT: 210 LBS | RESPIRATION RATE: 14 BRPM | HEART RATE: 89 BPM | TEMPERATURE: 96.5 F | HEIGHT: 66 IN

## 2018-05-24 DIAGNOSIS — Z98.890 OTHER SPECIFIED POSTPROCEDURAL STATES: Chronic | ICD-10-CM

## 2018-05-24 DIAGNOSIS — C85.99 NON-HODGKIN LYMPHOMA, UNSPECIFIED, EXTRANODAL AND SOLID ORGAN SITES: ICD-10-CM

## 2018-05-24 DIAGNOSIS — Z90.49 ACQUIRED ABSENCE OF OTHER SPECIFIED PARTS OF DIGESTIVE TRACT: Chronic | ICD-10-CM

## 2018-05-25 LAB
HCT VFR BLD CALC: 36.3 %
HGB BLD-MCNC: 11.5 G/DL
MCHC RBC-ENTMCNC: 26.4 PG
MCHC RBC-ENTMCNC: 31.7 G/DL
MCV RBC AUTO: 83.3 FL
PLATELET # BLD AUTO: 238 K/UL
PMV BLD: 12.4 FL
RBC # BLD: 4.36 M/UL
RBC # FLD: 15.7 %
WBC # FLD AUTO: 8.03 K/UL

## 2018-06-05 ENCOUNTER — OUTPATIENT (OUTPATIENT)
Dept: OUTPATIENT SERVICES | Facility: HOSPITAL | Age: 78
LOS: 1 days | Discharge: HOME | End: 2018-06-05

## 2018-06-05 DIAGNOSIS — C88.4 EXTRANODAL MARGINAL ZONE B-CELL LYMPHOMA OF MUCOSA-ASSOCIATED LYMPHOID TISSUE [MALT-LYMPHOMA]: ICD-10-CM

## 2018-06-05 DIAGNOSIS — Z98.890 OTHER SPECIFIED POSTPROCEDURAL STATES: Chronic | ICD-10-CM

## 2018-06-05 DIAGNOSIS — Z90.49 ACQUIRED ABSENCE OF OTHER SPECIFIED PARTS OF DIGESTIVE TRACT: Chronic | ICD-10-CM

## 2018-06-08 ENCOUNTER — LABORATORY RESULT (OUTPATIENT)
Age: 78
End: 2018-06-08

## 2018-06-08 ENCOUNTER — APPOINTMENT (OUTPATIENT)
Dept: HEMATOLOGY ONCOLOGY | Facility: CLINIC | Age: 78
End: 2018-06-08

## 2018-06-08 ENCOUNTER — RESULT REVIEW (OUTPATIENT)
Age: 78
End: 2018-06-08

## 2018-06-08 VITALS
TEMPERATURE: 96 F | HEIGHT: 66 IN | SYSTOLIC BLOOD PRESSURE: 155 MMHG | DIASTOLIC BLOOD PRESSURE: 94 MMHG | HEART RATE: 98 BPM | WEIGHT: 210 LBS | RESPIRATION RATE: 14 BRPM | BODY MASS INDEX: 33.75 KG/M2

## 2018-06-11 ENCOUNTER — LABORATORY RESULT (OUTPATIENT)
Age: 78
End: 2018-06-11

## 2018-06-18 LAB — HEMATOPATHOLOGY REPORT: SIGNIFICANT CHANGE UP

## 2018-06-26 ENCOUNTER — LABORATORY RESULT (OUTPATIENT)
Age: 78
End: 2018-06-26

## 2018-06-26 ENCOUNTER — RX RENEWAL (OUTPATIENT)
Age: 78
End: 2018-06-26

## 2018-06-26 ENCOUNTER — APPOINTMENT (OUTPATIENT)
Dept: HEMATOLOGY ONCOLOGY | Facility: CLINIC | Age: 78
End: 2018-06-26

## 2018-06-26 RX ORDER — ONDANSETRON 8 MG/1
8 TABLET ORAL
Qty: 30 | Refills: 3 | Status: ACTIVE | COMMUNITY
Start: 2018-06-26 | End: 1900-01-01

## 2018-06-26 RX ORDER — PROCHLORPERAZINE MALEATE 10 MG/1
10 TABLET ORAL EVERY 6 HOURS
Qty: 30 | Refills: 3 | Status: ACTIVE | COMMUNITY
Start: 2018-06-26 | End: 1900-01-01

## 2018-06-27 LAB
ALBUMIN SERPL ELPH-MCNC: 3.9 G/DL
ALP BLD-CCNC: 118 U/L
ALT SERPL-CCNC: 17 U/L
ANION GAP SERPL CALC-SCNC: 16 MMOL/L
AST SERPL-CCNC: 26 U/L
BILIRUB SERPL-MCNC: 0.4 MG/DL
BUN SERPL-MCNC: 16 MG/DL
CALCIUM SERPL-MCNC: 9.7 MG/DL
CHLORIDE SERPL-SCNC: 98 MMOL/L
CO2 SERPL-SCNC: 25 MMOL/L
CREAT SERPL-MCNC: 1.1 MG/DL
GLUCOSE SERPL-MCNC: 113 MG/DL
HCT VFR BLD CALC: 41.2 %
HGB BLD-MCNC: 13.1 G/DL
MCHC RBC-ENTMCNC: 26.7 PG
MCHC RBC-ENTMCNC: 31.8 G/DL
MCV RBC AUTO: 83.9 FL
PLATELET # BLD AUTO: 214 K/UL
PMV BLD: 12.6 FL
POTASSIUM SERPL-SCNC: 5 MMOL/L
PROT SERPL-MCNC: 7.2 G/DL
RBC # BLD: 4.91 M/UL
RBC # FLD: 16.8 %
SODIUM SERPL-SCNC: 139 MMOL/L
WBC # FLD AUTO: 6.87 K/UL

## 2018-06-28 ENCOUNTER — LABORATORY RESULT (OUTPATIENT)
Age: 78
End: 2018-06-28

## 2018-06-28 ENCOUNTER — APPOINTMENT (OUTPATIENT)
Dept: HEMATOLOGY ONCOLOGY | Facility: CLINIC | Age: 78
End: 2018-06-28

## 2018-06-28 VITALS
WEIGHT: 210 LBS | SYSTOLIC BLOOD PRESSURE: 113 MMHG | HEIGHT: 66 IN | RESPIRATION RATE: 14 BRPM | TEMPERATURE: 96.7 F | HEART RATE: 73 BPM | DIASTOLIC BLOOD PRESSURE: 66 MMHG | BODY MASS INDEX: 33.75 KG/M2

## 2018-06-28 LAB
ALBUMIN SERPL ELPH-MCNC: 3.9 G/DL
ALP BLD-CCNC: 117 U/L
ALT SERPL-CCNC: 16 U/L
ANION GAP SERPL CALC-SCNC: 13 MMOL/L
AST SERPL-CCNC: 21 U/L
BILIRUB SERPL-MCNC: 0.4 MG/DL
BUN SERPL-MCNC: 14 MG/DL
CALCIUM SERPL-MCNC: 9.2 MG/DL
CHLORIDE SERPL-SCNC: 100 MMOL/L
CO2 SERPL-SCNC: 27 MMOL/L
CREAT SERPL-MCNC: 0.9 MG/DL
GLUCOSE SERPL-MCNC: 104 MG/DL
HCT VFR BLD CALC: 38.8 %
HGB BLD-MCNC: 12.5 G/DL
MCHC RBC-ENTMCNC: 27 PG
MCHC RBC-ENTMCNC: 32.2 G/DL
MCV RBC AUTO: 83.8 FL
PLATELET # BLD AUTO: 204 K/UL
PMV BLD: 12.4 FL
POTASSIUM SERPL-SCNC: 4.8 MMOL/L
PROT SERPL-MCNC: 6.9 G/DL
RBC # BLD: 4.63 M/UL
RBC # FLD: 16.7 %
SODIUM SERPL-SCNC: 140 MMOL/L
WBC # FLD AUTO: 6.39 K/UL

## 2018-06-29 ENCOUNTER — APPOINTMENT (OUTPATIENT)
Dept: INFUSION THERAPY | Facility: CLINIC | Age: 78
End: 2018-06-29

## 2018-06-29 RX ORDER — RITUXIMAB 10 MG/ML
750 INJECTION, SOLUTION INTRAVENOUS ONCE
Qty: 0 | Refills: 0 | Status: COMPLETED | OUTPATIENT
Start: 2018-06-29 | End: 2018-06-29

## 2018-06-29 RX ORDER — DIPHENHYDRAMINE HCL 50 MG
50 CAPSULE ORAL ONCE
Qty: 0 | Refills: 0 | Status: COMPLETED | OUTPATIENT
Start: 2018-06-29 | End: 2018-06-29

## 2018-06-29 RX ORDER — BENDAMUSTINE HYDROCHLORIDE 100 MG/20ML
120 INJECTION, POWDER, LYOPHILIZED, FOR SOLUTION INTRAVENOUS ONCE
Qty: 0 | Refills: 0 | Status: COMPLETED | OUTPATIENT
Start: 2018-06-29 | End: 2018-06-29

## 2018-06-29 RX ORDER — ACETAMINOPHEN 500 MG
650 TABLET ORAL ONCE
Qty: 0 | Refills: 0 | Status: COMPLETED | OUTPATIENT
Start: 2018-06-29 | End: 2018-06-29

## 2018-06-29 RX ORDER — DEXAMETHASONE 0.5 MG/5ML
12 ELIXIR ORAL ONCE
Qty: 0 | Refills: 0 | Status: COMPLETED | OUTPATIENT
Start: 2018-06-29 | End: 2018-06-29

## 2018-06-29 RX ADMIN — Medication 183 MILLIGRAM(S): at 09:42

## 2018-06-29 RX ADMIN — Medication 153 MILLIGRAM(S): at 09:43

## 2018-06-29 RX ADMIN — BENDAMUSTINE HYDROCHLORIDE 307.98 MILLIGRAM(S): 100 INJECTION, POWDER, LYOPHILIZED, FOR SOLUTION INTRAVENOUS at 09:56

## 2018-06-29 RX ADMIN — RITUXIMAB 187.5 MILLIGRAM(S): 10 INJECTION, SOLUTION INTRAVENOUS at 10:09

## 2018-06-29 RX ADMIN — Medication 650 MILLIGRAM(S): at 10:10

## 2018-06-29 RX ADMIN — Medication 650 MILLIGRAM(S): at 09:42

## 2018-07-06 ENCOUNTER — APPOINTMENT (OUTPATIENT)
Dept: INFUSION THERAPY | Facility: CLINIC | Age: 78
End: 2018-07-06

## 2018-07-06 ENCOUNTER — LABORATORY RESULT (OUTPATIENT)
Age: 78
End: 2018-07-06

## 2018-07-06 ENCOUNTER — APPOINTMENT (OUTPATIENT)
Dept: HEMATOLOGY ONCOLOGY | Facility: CLINIC | Age: 78
End: 2018-07-06

## 2018-07-06 VITALS
SYSTOLIC BLOOD PRESSURE: 116 MMHG | TEMPERATURE: 96.5 F | RESPIRATION RATE: 18 BRPM | DIASTOLIC BLOOD PRESSURE: 61 MMHG | HEART RATE: 86 BPM

## 2018-07-06 RX ORDER — HYDROCORTISONE 25 MG/G
2.5 CREAM TOPICAL
Qty: 1 | Refills: 1 | Status: ACTIVE | COMMUNITY
Start: 2018-07-06 | End: 1900-01-01

## 2018-07-06 RX ORDER — BENDAMUSTINE HYDROCHLORIDE 100 MG/20ML
120 INJECTION, POWDER, LYOPHILIZED, FOR SOLUTION INTRAVENOUS ONCE
Qty: 0 | Refills: 0 | Status: COMPLETED | OUTPATIENT
Start: 2018-07-06 | End: 2018-07-06

## 2018-07-06 RX ORDER — DEXAMETHASONE 0.5 MG/5ML
12 ELIXIR ORAL ONCE
Qty: 0 | Refills: 0 | Status: COMPLETED | OUTPATIENT
Start: 2018-07-06 | End: 2018-07-06

## 2018-07-06 RX ORDER — ACETAMINOPHEN 500 MG
650 TABLET ORAL ONCE
Qty: 0 | Refills: 0 | Status: COMPLETED | OUTPATIENT
Start: 2018-07-06 | End: 2018-07-06

## 2018-07-06 RX ORDER — DIPHENHYDRAMINE HCL 50 MG
50 CAPSULE ORAL ONCE
Qty: 0 | Refills: 0 | Status: COMPLETED | OUTPATIENT
Start: 2018-07-06 | End: 2018-07-06

## 2018-07-06 RX ADMIN — BENDAMUSTINE HYDROCHLORIDE 307.98 MILLIGRAM(S): 100 INJECTION, POWDER, LYOPHILIZED, FOR SOLUTION INTRAVENOUS at 10:35

## 2018-07-06 RX ADMIN — Medication 183 MILLIGRAM(S): at 09:50

## 2018-07-06 RX ADMIN — Medication 650 MILLIGRAM(S): at 09:51

## 2018-07-06 RX ADMIN — Medication 650 MILLIGRAM(S): at 09:50

## 2018-07-06 RX ADMIN — Medication 153 MILLIGRAM(S): at 09:50

## 2018-07-08 LAB
HCT VFR BLD CALC: 39.5 %
HGB BLD-MCNC: 12.9 G/DL
MCHC RBC-ENTMCNC: 27.1 PG
MCHC RBC-ENTMCNC: 32.7 G/DL
MCV RBC AUTO: 83 FL
PLATELET # BLD AUTO: 182 K/UL
PMV BLD: 12.9 FL
RBC # BLD: 4.76 M/UL
RBC # FLD: 16.9 %
WBC # FLD AUTO: 6.61 K/UL

## 2018-07-19 ENCOUNTER — APPOINTMENT (OUTPATIENT)
Dept: HEMATOLOGY ONCOLOGY | Facility: CLINIC | Age: 78
End: 2018-07-19

## 2018-07-19 ENCOUNTER — LABORATORY RESULT (OUTPATIENT)
Age: 78
End: 2018-07-19

## 2018-07-19 VITALS
WEIGHT: 208 LBS | RESPIRATION RATE: 18 BRPM | BODY MASS INDEX: 29.78 KG/M2 | DIASTOLIC BLOOD PRESSURE: 87 MMHG | SYSTOLIC BLOOD PRESSURE: 130 MMHG | HEIGHT: 70 IN | HEART RATE: 69 BPM

## 2018-07-19 RX ORDER — ACYCLOVIR 400 MG/1
400 TABLET ORAL TWICE DAILY
Qty: 100 | Refills: 2 | Status: ACTIVE | COMMUNITY
Start: 2018-07-19 | End: 1900-01-01

## 2018-07-20 ENCOUNTER — APPOINTMENT (OUTPATIENT)
Dept: INFUSION THERAPY | Facility: CLINIC | Age: 78
End: 2018-07-20

## 2018-07-20 PROBLEM — I25.10 ATHEROSCLEROTIC HEART DISEASE OF NATIVE CORONARY ARTERY WITHOUT ANGINA PECTORIS: Chronic | Status: ACTIVE | Noted: 2018-04-07

## 2018-07-20 LAB
ALBUMIN SERPL ELPH-MCNC: 3.6 G/DL
ALP BLD-CCNC: 117 U/L
ALT SERPL-CCNC: 18 U/L
ANION GAP SERPL CALC-SCNC: 15 MMOL/L
AST SERPL-CCNC: 21 U/L
BILIRUB SERPL-MCNC: 0.3 MG/DL
BUN SERPL-MCNC: 10 MG/DL
CALCIUM SERPL-MCNC: 9 MG/DL
CHLORIDE SERPL-SCNC: 100 MMOL/L
CO2 SERPL-SCNC: 26 MMOL/L
CREAT SERPL-MCNC: 0.8 MG/DL
GLUCOSE SERPL-MCNC: 94 MG/DL
HCT VFR BLD CALC: 39 %
HGB BLD-MCNC: 12.4 G/DL
MCHC RBC-ENTMCNC: 26.7 PG
MCHC RBC-ENTMCNC: 31.8 G/DL
MCV RBC AUTO: 83.9 FL
PLATELET # BLD AUTO: 178 K/UL
PMV BLD: 11.4 FL
POTASSIUM SERPL-SCNC: 4.1 MMOL/L
PROT SERPL-MCNC: 6.6 G/DL
RBC # BLD: 4.65 M/UL
RBC # FLD: 17.7 %
SODIUM SERPL-SCNC: 141 MMOL/L
WBC # FLD AUTO: 5.4 K/UL

## 2018-07-20 RX ORDER — BENDAMUSTINE HYDROCHLORIDE 100 MG/20ML
140 INJECTION, POWDER, LYOPHILIZED, FOR SOLUTION INTRAVENOUS ONCE
Qty: 0 | Refills: 0 | Status: COMPLETED | OUTPATIENT
Start: 2018-07-20 | End: 2018-07-20

## 2018-07-20 RX ORDER — ACETAMINOPHEN 500 MG
650 TABLET ORAL ONCE
Qty: 0 | Refills: 0 | Status: COMPLETED | OUTPATIENT
Start: 2018-07-20 | End: 2018-07-20

## 2018-07-20 RX ORDER — DIPHENHYDRAMINE HCL 50 MG
50 CAPSULE ORAL ONCE
Qty: 0 | Refills: 0 | Status: COMPLETED | OUTPATIENT
Start: 2018-07-20 | End: 2018-07-20

## 2018-07-20 RX ORDER — RITUXIMAB 10 MG/ML
750 INJECTION, SOLUTION INTRAVENOUS ONCE
Qty: 0 | Refills: 0 | Status: COMPLETED | OUTPATIENT
Start: 2018-07-20 | End: 2018-07-20

## 2018-07-20 RX ORDER — DEXAMETHASONE 0.5 MG/5ML
12 ELIXIR ORAL ONCE
Qty: 0 | Refills: 0 | Status: COMPLETED | OUTPATIENT
Start: 2018-07-20 | End: 2018-07-20

## 2018-07-20 RX ADMIN — Medication 650 MILLIGRAM(S): at 09:23

## 2018-07-20 RX ADMIN — BENDAMUSTINE HYDROCHLORIDE 309.36 MILLIGRAM(S): 100 INJECTION, POWDER, LYOPHILIZED, FOR SOLUTION INTRAVENOUS at 09:51

## 2018-07-20 RX ADMIN — Medication 183 MILLIGRAM(S): at 09:22

## 2018-07-20 RX ADMIN — Medication 153 MILLIGRAM(S): at 09:22

## 2018-07-20 RX ADMIN — Medication 650 MILLIGRAM(S): at 09:22

## 2018-07-20 RX ADMIN — RITUXIMAB 187.5 MILLIGRAM(S): 10 INJECTION, SOLUTION INTRAVENOUS at 09:50

## 2018-07-27 ENCOUNTER — LABORATORY RESULT (OUTPATIENT)
Age: 78
End: 2018-07-27

## 2018-07-27 ENCOUNTER — APPOINTMENT (OUTPATIENT)
Dept: HEMATOLOGY ONCOLOGY | Facility: CLINIC | Age: 78
End: 2018-07-27

## 2018-07-27 ENCOUNTER — APPOINTMENT (OUTPATIENT)
Dept: INFUSION THERAPY | Facility: CLINIC | Age: 78
End: 2018-07-27

## 2018-07-27 LAB
HCT VFR BLD CALC: 38.5 %
HGB BLD-MCNC: 12.7 G/DL
MCHC RBC-ENTMCNC: 27.1 PG
MCHC RBC-ENTMCNC: 33 G/DL
MCV RBC AUTO: 82.1 FL
PLATELET # BLD AUTO: 124 K/UL
PMV BLD: 11.9 FL
RBC # BLD: 4.69 M/UL
RBC # FLD: 17.9 %
WBC # FLD AUTO: 4.87 K/UL

## 2018-07-27 RX ORDER — DEXAMETHASONE 0.5 MG/5ML
12 ELIXIR ORAL ONCE
Qty: 0 | Refills: 0 | Status: COMPLETED | OUTPATIENT
Start: 2018-07-27 | End: 2018-07-27

## 2018-07-27 RX ORDER — DIPHENHYDRAMINE HCL 50 MG
25 CAPSULE ORAL ONCE
Qty: 0 | Refills: 0 | Status: COMPLETED | OUTPATIENT
Start: 2018-07-27 | End: 2018-07-27

## 2018-07-27 RX ORDER — ACETAMINOPHEN 500 MG
650 TABLET ORAL ONCE
Qty: 0 | Refills: 0 | Status: COMPLETED | OUTPATIENT
Start: 2018-07-27 | End: 2018-07-27

## 2018-07-27 RX ORDER — BENDAMUSTINE HYDROCHLORIDE 100 MG/20ML
140 INJECTION, POWDER, LYOPHILIZED, FOR SOLUTION INTRAVENOUS ONCE
Qty: 0 | Refills: 0 | Status: COMPLETED | OUTPATIENT
Start: 2018-07-27 | End: 2018-07-27

## 2018-07-27 RX ADMIN — BENDAMUSTINE HYDROCHLORIDE 309.36 MILLIGRAM(S): 100 INJECTION, POWDER, LYOPHILIZED, FOR SOLUTION INTRAVENOUS at 10:26

## 2018-07-27 RX ADMIN — Medication 183 MILLIGRAM(S): at 10:04

## 2018-07-27 RX ADMIN — Medication 650 MILLIGRAM(S): at 10:04

## 2018-07-27 RX ADMIN — Medication 151.5 MILLIGRAM(S): at 10:04

## 2018-07-27 RX ADMIN — Medication 650 MILLIGRAM(S): at 10:28

## 2018-08-09 ENCOUNTER — APPOINTMENT (OUTPATIENT)
Dept: HEMATOLOGY ONCOLOGY | Facility: CLINIC | Age: 78
End: 2018-08-09

## 2018-08-09 ENCOUNTER — LABORATORY RESULT (OUTPATIENT)
Age: 78
End: 2018-08-09

## 2018-08-09 VITALS
DIASTOLIC BLOOD PRESSURE: 69 MMHG | TEMPERATURE: 96.8 F | HEIGHT: 70 IN | RESPIRATION RATE: 18 BRPM | HEART RATE: 95 BPM | WEIGHT: 205 LBS | BODY MASS INDEX: 29.35 KG/M2 | SYSTOLIC BLOOD PRESSURE: 110 MMHG

## 2018-08-10 ENCOUNTER — APPOINTMENT (OUTPATIENT)
Dept: INFUSION THERAPY | Facility: CLINIC | Age: 78
End: 2018-08-10

## 2018-08-10 LAB
HCT VFR BLD CALC: 38.6 %
HGB BLD-MCNC: 13.1 G/DL
MCHC RBC-ENTMCNC: 27.8 PG
MCHC RBC-ENTMCNC: 33.9 G/DL
MCV RBC AUTO: 82 FL
PLATELET # BLD AUTO: 152 K/UL
PMV BLD: 10.6 FL
RBC # BLD: 4.71 M/UL
RBC # FLD: 18.5 %
WBC # FLD AUTO: 4.05 K/UL

## 2018-08-17 ENCOUNTER — APPOINTMENT (OUTPATIENT)
Dept: HEMATOLOGY ONCOLOGY | Facility: CLINIC | Age: 78
End: 2018-08-17

## 2018-08-17 ENCOUNTER — LABORATORY RESULT (OUTPATIENT)
Age: 78
End: 2018-08-17

## 2018-08-17 ENCOUNTER — APPOINTMENT (OUTPATIENT)
Dept: INFUSION THERAPY | Facility: CLINIC | Age: 78
End: 2018-08-17

## 2018-08-17 LAB
HCT VFR BLD CALC: 38.1 %
HGB BLD-MCNC: 13.1 G/DL
MCHC RBC-ENTMCNC: 28.2 PG
MCHC RBC-ENTMCNC: 34.4 G/DL
MCV RBC AUTO: 81.9 FL
PLATELET # BLD AUTO: 141 K/UL
PMV BLD: 11.1 FL
RBC # BLD: 4.65 M/UL
RBC # FLD: 17.9 %
WBC # FLD AUTO: 5.5 K/UL

## 2018-08-17 RX ORDER — ACETAMINOPHEN 500 MG
650 TABLET ORAL ONCE
Qty: 0 | Refills: 0 | Status: COMPLETED | OUTPATIENT
Start: 2018-08-17 | End: 2018-08-17

## 2018-08-17 RX ORDER — DIPHENHYDRAMINE HCL 50 MG
25 CAPSULE ORAL ONCE
Qty: 0 | Refills: 0 | Status: COMPLETED | OUTPATIENT
Start: 2018-08-17 | End: 2018-08-17

## 2018-08-17 RX ORDER — DEXAMETHASONE 0.5 MG/5ML
12 ELIXIR ORAL ONCE
Qty: 0 | Refills: 0 | Status: COMPLETED | OUTPATIENT
Start: 2018-08-17 | End: 2018-08-17

## 2018-08-17 RX ORDER — RITUXIMAB 10 MG/ML
750 INJECTION, SOLUTION INTRAVENOUS ONCE
Qty: 0 | Refills: 0 | Status: COMPLETED | OUTPATIENT
Start: 2018-08-17 | End: 2018-08-17

## 2018-08-17 RX ORDER — BENDAMUSTINE HYDROCHLORIDE 100 MG/20ML
140 INJECTION, POWDER, LYOPHILIZED, FOR SOLUTION INTRAVENOUS ONCE
Qty: 0 | Refills: 0 | Status: COMPLETED | OUTPATIENT
Start: 2018-08-17 | End: 2018-08-17

## 2018-08-17 RX ADMIN — Medication 650 MILLIGRAM(S): at 10:06

## 2018-08-17 RX ADMIN — Medication 183 MILLIGRAM(S): at 10:06

## 2018-08-17 RX ADMIN — Medication 151.5 MILLIGRAM(S): at 10:06

## 2018-08-17 RX ADMIN — RITUXIMAB 187.5 MILLIGRAM(S): 10 INJECTION, SOLUTION INTRAVENOUS at 10:10

## 2018-08-17 RX ADMIN — BENDAMUSTINE HYDROCHLORIDE 309.36 MILLIGRAM(S): 100 INJECTION, POWDER, LYOPHILIZED, FOR SOLUTION INTRAVENOUS at 10:11

## 2018-08-17 RX ADMIN — Medication 650 MILLIGRAM(S): at 10:11

## 2018-08-24 ENCOUNTER — LABORATORY RESULT (OUTPATIENT)
Age: 78
End: 2018-08-24

## 2018-08-24 ENCOUNTER — APPOINTMENT (OUTPATIENT)
Dept: INFUSION THERAPY | Facility: CLINIC | Age: 78
End: 2018-08-24

## 2018-08-24 ENCOUNTER — APPOINTMENT (OUTPATIENT)
Dept: HEMATOLOGY ONCOLOGY | Facility: CLINIC | Age: 78
End: 2018-08-24

## 2018-08-24 LAB
HCT VFR BLD CALC: 36.5 %
HGB BLD-MCNC: 12.6 G/DL
MCHC RBC-ENTMCNC: 28.3 PG
MCHC RBC-ENTMCNC: 34.5 G/DL
MCV RBC AUTO: 82 FL
PLATELET # BLD AUTO: 133 K/UL
PMV BLD: 10.9 FL
RBC # BLD: 4.45 M/UL
RBC # FLD: 18 %
WBC # FLD AUTO: 3.54 K/UL

## 2018-08-24 RX ORDER — ACETAMINOPHEN 500 MG
650 TABLET ORAL ONCE
Qty: 0 | Refills: 0 | Status: COMPLETED | OUTPATIENT
Start: 2018-08-24 | End: 2018-08-24

## 2018-08-24 RX ORDER — BENDAMUSTINE HYDROCHLORIDE 100 MG/20ML
140 INJECTION, POWDER, LYOPHILIZED, FOR SOLUTION INTRAVENOUS ONCE
Qty: 0 | Refills: 0 | Status: COMPLETED | OUTPATIENT
Start: 2018-08-24 | End: 2018-08-24

## 2018-08-24 RX ORDER — DIPHENHYDRAMINE HCL 50 MG
25 CAPSULE ORAL ONCE
Qty: 0 | Refills: 0 | Status: COMPLETED | OUTPATIENT
Start: 2018-08-24 | End: 2018-08-24

## 2018-08-24 RX ORDER — DEXAMETHASONE 0.5 MG/5ML
12 ELIXIR ORAL ONCE
Qty: 0 | Refills: 0 | Status: COMPLETED | OUTPATIENT
Start: 2018-08-24 | End: 2018-08-24

## 2018-08-24 RX ADMIN — Medication 650 MILLIGRAM(S): at 09:25

## 2018-08-24 RX ADMIN — Medication 151.5 MILLIGRAM(S): at 09:26

## 2018-08-24 RX ADMIN — BENDAMUSTINE HYDROCHLORIDE 309.36 MILLIGRAM(S): 100 INJECTION, POWDER, LYOPHILIZED, FOR SOLUTION INTRAVENOUS at 09:26

## 2018-08-24 RX ADMIN — Medication 183 MILLIGRAM(S): at 09:25

## 2018-08-24 RX ADMIN — Medication 650 MILLIGRAM(S): at 09:09

## 2018-09-06 ENCOUNTER — APPOINTMENT (OUTPATIENT)
Dept: HEMATOLOGY ONCOLOGY | Facility: CLINIC | Age: 78
End: 2018-09-06

## 2018-09-06 ENCOUNTER — LABORATORY RESULT (OUTPATIENT)
Age: 78
End: 2018-09-06

## 2018-09-06 VITALS
TEMPERATURE: 96 F | HEART RATE: 114 BPM | DIASTOLIC BLOOD PRESSURE: 82 MMHG | WEIGHT: 205 LBS | HEIGHT: 70 IN | RESPIRATION RATE: 14 BRPM | SYSTOLIC BLOOD PRESSURE: 105 MMHG | BODY MASS INDEX: 29.35 KG/M2

## 2018-09-07 ENCOUNTER — APPOINTMENT (OUTPATIENT)
Dept: INFUSION THERAPY | Facility: CLINIC | Age: 78
End: 2018-09-07

## 2018-09-07 VITALS
DIASTOLIC BLOOD PRESSURE: 57 MMHG | RESPIRATION RATE: 18 BRPM | HEART RATE: 107 BPM | TEMPERATURE: 96.2 F | SYSTOLIC BLOOD PRESSURE: 104 MMHG

## 2018-09-07 LAB
HCT VFR BLD CALC: 36.8 %
HGB BLD-MCNC: 12.6 G/DL
MCHC RBC-ENTMCNC: 28.4 PG
MCHC RBC-ENTMCNC: 34.2 G/DL
MCV RBC AUTO: 83.1 FL
PLATELET # BLD AUTO: 141 K/UL
PMV BLD: 12.2 FL
RBC # BLD: 4.43 M/UL
RBC # FLD: 17.8 %
WBC # FLD AUTO: 3.69 K/UL

## 2018-09-07 RX ORDER — DIPHENHYDRAMINE HCL 50 MG
50 CAPSULE ORAL ONCE
Qty: 0 | Refills: 0 | Status: COMPLETED | OUTPATIENT
Start: 2018-09-07 | End: 2018-09-07

## 2018-09-07 RX ORDER — BENDAMUSTINE HYDROCHLORIDE 100 MG/20ML
140 INJECTION, POWDER, LYOPHILIZED, FOR SOLUTION INTRAVENOUS ONCE
Qty: 0 | Refills: 0 | Status: COMPLETED | OUTPATIENT
Start: 2018-09-07 | End: 2018-09-07

## 2018-09-07 RX ORDER — DEXAMETHASONE 0.5 MG/5ML
12 ELIXIR ORAL ONCE
Qty: 0 | Refills: 0 | Status: COMPLETED | OUTPATIENT
Start: 2018-09-07 | End: 2018-09-07

## 2018-09-07 RX ORDER — ACETAMINOPHEN 500 MG
650 TABLET ORAL ONCE
Qty: 0 | Refills: 0 | Status: COMPLETED | OUTPATIENT
Start: 2018-09-07 | End: 2018-09-07

## 2018-09-07 RX ORDER — RITUXIMAB 10 MG/ML
750 INJECTION, SOLUTION INTRAVENOUS ONCE
Qty: 0 | Refills: 0 | Status: COMPLETED | OUTPATIENT
Start: 2018-09-07 | End: 2018-09-07

## 2018-09-07 RX ADMIN — Medication 153 MILLIGRAM(S): at 09:08

## 2018-09-07 RX ADMIN — Medication 650 MILLIGRAM(S): at 09:11

## 2018-09-07 RX ADMIN — RITUXIMAB 250 MILLIGRAM(S): 10 INJECTION, SOLUTION INTRAVENOUS at 09:29

## 2018-09-07 RX ADMIN — Medication 183 MILLIGRAM(S): at 09:08

## 2018-09-07 RX ADMIN — BENDAMUSTINE HYDROCHLORIDE 333.6 MILLIGRAM(S): 100 INJECTION, POWDER, LYOPHILIZED, FOR SOLUTION INTRAVENOUS at 09:29

## 2018-09-07 RX ADMIN — Medication 650 MILLIGRAM(S): at 09:12

## 2018-09-14 ENCOUNTER — OUTPATIENT (OUTPATIENT)
Dept: OUTPATIENT SERVICES | Facility: HOSPITAL | Age: 78
LOS: 1 days | Discharge: HOME | End: 2018-09-14

## 2018-09-14 ENCOUNTER — APPOINTMENT (OUTPATIENT)
Dept: INFUSION THERAPY | Facility: CLINIC | Age: 78
End: 2018-09-14

## 2018-09-14 ENCOUNTER — LABORATORY RESULT (OUTPATIENT)
Age: 78
End: 2018-09-14

## 2018-09-14 DIAGNOSIS — Z90.49 ACQUIRED ABSENCE OF OTHER SPECIFIED PARTS OF DIGESTIVE TRACT: Chronic | ICD-10-CM

## 2018-09-14 DIAGNOSIS — Z98.890 OTHER SPECIFIED POSTPROCEDURAL STATES: Chronic | ICD-10-CM

## 2018-09-14 DIAGNOSIS — C85.90 NON-HODGKIN LYMPHOMA, UNSPECIFIED, UNSPECIFIED SITE: ICD-10-CM

## 2018-09-14 DIAGNOSIS — C85.99 NON-HODGKIN LYMPHOMA, UNSPECIFIED, EXTRANODAL AND SOLID ORGAN SITES: ICD-10-CM

## 2018-09-14 RX ORDER — BENDAMUSTINE HYDROCHLORIDE 100 MG/20ML
140 INJECTION, POWDER, LYOPHILIZED, FOR SOLUTION INTRAVENOUS ONCE
Qty: 0 | Refills: 0 | Status: COMPLETED | OUTPATIENT
Start: 2018-09-14 | End: 2018-09-14

## 2018-09-14 RX ORDER — DIPHENHYDRAMINE HCL 50 MG
25 CAPSULE ORAL ONCE
Qty: 0 | Refills: 0 | Status: COMPLETED | OUTPATIENT
Start: 2018-09-14 | End: 2018-09-14

## 2018-09-14 RX ORDER — ACETAMINOPHEN 500 MG
650 TABLET ORAL ONCE
Qty: 0 | Refills: 0 | Status: COMPLETED | OUTPATIENT
Start: 2018-09-14 | End: 2018-09-14

## 2018-09-14 RX ORDER — DEXAMETHASONE 0.5 MG/5ML
12 ELIXIR ORAL ONCE
Qty: 0 | Refills: 0 | Status: COMPLETED | OUTPATIENT
Start: 2018-09-14 | End: 2018-09-14

## 2018-09-14 RX ADMIN — BENDAMUSTINE HYDROCHLORIDE 333.6 MILLIGRAM(S): 100 INJECTION, POWDER, LYOPHILIZED, FOR SOLUTION INTRAVENOUS at 10:02

## 2018-09-14 RX ADMIN — Medication 183 MILLIGRAM(S): at 10:02

## 2018-09-14 RX ADMIN — Medication 650 MILLIGRAM(S): at 10:02

## 2018-09-14 RX ADMIN — Medication 650 MILLIGRAM(S): at 10:03

## 2018-09-14 RX ADMIN — Medication 151.5 MILLIGRAM(S): at 10:02

## 2018-09-19 ENCOUNTER — FORM ENCOUNTER (OUTPATIENT)
Age: 78
End: 2018-09-19

## 2018-09-20 ENCOUNTER — OUTPATIENT (OUTPATIENT)
Dept: OUTPATIENT SERVICES | Facility: HOSPITAL | Age: 78
LOS: 1 days | Discharge: HOME | End: 2018-09-20

## 2018-09-20 DIAGNOSIS — C88.4 EXTRANODAL MARGINAL ZONE B-CELL LYMPHOMA OF MUCOSA-ASSOCIATED LYMPHOID TISSUE [MALT-LYMPHOMA]: ICD-10-CM

## 2018-09-20 DIAGNOSIS — Z90.49 ACQUIRED ABSENCE OF OTHER SPECIFIED PARTS OF DIGESTIVE TRACT: Chronic | ICD-10-CM

## 2018-09-20 DIAGNOSIS — Z98.890 OTHER SPECIFIED POSTPROCEDURAL STATES: Chronic | ICD-10-CM

## 2018-09-20 LAB — GLUCOSE BLDC GLUCOMTR-MCNC: 157 MG/DL — HIGH (ref 70–99)

## 2018-09-27 ENCOUNTER — APPOINTMENT (OUTPATIENT)
Dept: HEMATOLOGY ONCOLOGY | Facility: CLINIC | Age: 78
End: 2018-09-27

## 2018-09-27 VITALS
HEART RATE: 87 BPM | BODY MASS INDEX: 29.35 KG/M2 | SYSTOLIC BLOOD PRESSURE: 123 MMHG | DIASTOLIC BLOOD PRESSURE: 85 MMHG | RESPIRATION RATE: 18 BRPM | WEIGHT: 205 LBS | TEMPERATURE: 97 F | HEIGHT: 70 IN

## 2018-09-27 RX ORDER — MIRTAZAPINE 15 MG/1
15 TABLET, FILM COATED ORAL
Qty: 30 | Refills: 0 | Status: ACTIVE | COMMUNITY
Start: 2018-09-27 | End: 1900-01-01

## 2018-10-04 ENCOUNTER — APPOINTMENT (OUTPATIENT)
Dept: HEMATOLOGY ONCOLOGY | Facility: CLINIC | Age: 78
End: 2018-10-04

## 2018-10-05 ENCOUNTER — APPOINTMENT (OUTPATIENT)
Dept: INFUSION THERAPY | Facility: CLINIC | Age: 78
End: 2018-10-05

## 2018-10-12 ENCOUNTER — APPOINTMENT (OUTPATIENT)
Dept: INFUSION THERAPY | Facility: CLINIC | Age: 78
End: 2018-10-12

## 2018-10-12 ENCOUNTER — APPOINTMENT (OUTPATIENT)
Dept: HEMATOLOGY ONCOLOGY | Facility: CLINIC | Age: 78
End: 2018-10-12

## 2018-11-29 ENCOUNTER — APPOINTMENT (OUTPATIENT)
Dept: HEMATOLOGY ONCOLOGY | Facility: CLINIC | Age: 78
End: 2018-11-29

## 2018-11-29 ENCOUNTER — LABORATORY RESULT (OUTPATIENT)
Age: 78
End: 2018-11-29

## 2018-11-29 ENCOUNTER — OUTPATIENT (OUTPATIENT)
Dept: OUTPATIENT SERVICES | Facility: HOSPITAL | Age: 78
LOS: 1 days | Discharge: HOME | End: 2018-11-29

## 2018-11-29 VITALS
RESPIRATION RATE: 18 BRPM | BODY MASS INDEX: 27.49 KG/M2 | TEMPERATURE: 97.9 F | HEIGHT: 70 IN | HEART RATE: 57 BPM | SYSTOLIC BLOOD PRESSURE: 89 MMHG | WEIGHT: 192 LBS | DIASTOLIC BLOOD PRESSURE: 48 MMHG

## 2018-11-29 DIAGNOSIS — Z90.49 ACQUIRED ABSENCE OF OTHER SPECIFIED PARTS OF DIGESTIVE TRACT: Chronic | ICD-10-CM

## 2018-11-29 DIAGNOSIS — C85.99 NON-HODGKIN LYMPHOMA, UNSPECIFIED, EXTRANODAL AND SOLID ORGAN SITES: ICD-10-CM

## 2018-11-29 DIAGNOSIS — Z98.890 OTHER SPECIFIED POSTPROCEDURAL STATES: Chronic | ICD-10-CM

## 2018-11-29 RX ORDER — ALLOPURINOL 300 MG/1
300 TABLET ORAL
Qty: 30 | Refills: 2 | Status: DISCONTINUED | COMMUNITY
Start: 2018-06-28 | End: 2018-11-29

## 2018-11-29 NOTE — PHYSICAL EXAM
[Normal] : no peripheral adenopathy appreciated [de-identified] : well preserved pleasant female in no acute distress.

## 2018-11-29 NOTE — HISTORY OF PRESENT ILLNESS
[de-identified] : 77 year old white female with PMH of hypertension , CAD s/p MI and angioplasty , atrial fibrillation on xarelto .  long history of PUD and GE reflux symptoms , never had endoscopy until recently when she presented with epigastric pain and melanotic stools for 2 days . EGD showed gastric mass , biopsy consistent with low grade MALT . H pylori negative. She did not require transfusions and was discharged after symptoms resolved. She denies nausea, vomiting , weight loss, fever , night sweats. SHe has dyspnea on moderate exertion and mild peripheral edema. She is active , lives alone , drives and requires no assistance with daily activity . * months ago she was treated for severe zoster involving left thoracic dermatome and mild residual itching . one month ago she had pneumonia not requiring hospitalization . No mammograms in years , last colonoscopy during recent admission was negative .  [de-identified] : 05/24/2018 : Norma returns for newly diagnosed low grade MALT lymphoma of stomach ( H pylori negative ? ) . She is on po iron for iron deficiency anemia . blood work notable for elevated beta 2 microglobulin ( over 4 ) with normal creatinine and  LDH . Weak paraprotein on SPEP , slightly elevated alk Phos ( GGT pending ) . she denies any recurrence of bleeding . CT scan shows no evidence of disease . \par \par 06/28/2018 : Patient returns for gastric lymphoma, PET scan shows multiple areas of FDG avid disease involving lymph nodes and bone including right iliac bone. She complains only of mild fatigue , no hematochezia, fever , sweats or bone pain . Bone marrow is negative . She is on po iron . \par \par 07/19/2018 : Patient returns after cycle 1 of BR given with dose reduction and day 1 and 8 schedule , she tolerated treatment well except for mild rash on the palms, no nausea, vomiting or mucositis. weight is stable , she denies abdominal pain or hematochezia.  \par \par 08/09/2018 : patient returns after cycle 2 of BR , mando given at 70mg/m2 . She tolerated treatment except for increasing fatigue . She denies, fever , weight loss , abdominal pain , nausea, diarrhea or stomatitis. \par \par 09/06/2018 : Patient returns for follow up after cycle 3 of BR , given in weekly schedule . she complains only of mild fatigue. otherwise tolerated treatment extremely well . \par \par 09/27/2018 Patient returns after cycle 4 , she feels exhausted and feels depressed as per family , she complains of insomnia . She denies weight loss, nausea, hematochezia . PET scan shows complete resolution of all FDG avid sites including lymph nodes and bone . \par \par 11/29/2018 : Patient returns for history of gastric lymphoma , stage 4 s/p chemoimmunotherapy . She feels well except for mild easy fatigability and decreased appetite, no weight loss, nausea or vomiting . She slept for 2 days after one dose of mirtazipine .

## 2018-11-29 NOTE — ASSESSMENT
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 Awaiting repeat EGD in December , will repeat routine blood work and follow up in 3 months .

## 2018-11-30 LAB
ALBUMIN SERPL ELPH-MCNC: 3.9 G/DL
ALP BLD-CCNC: 112 U/L
ALT SERPL-CCNC: 14 U/L
ANION GAP SERPL CALC-SCNC: 17 MMOL/L
APTT BLD: 39.4 SEC
AST SERPL-CCNC: 27 U/L
BILIRUB SERPL-MCNC: 0.4 MG/DL
BUN SERPL-MCNC: 14 MG/DL
CALCIUM SERPL-MCNC: 9.5 MG/DL
CHLORIDE SERPL-SCNC: 99 MMOL/L
CO2 SERPL-SCNC: 25 MMOL/L
CREAT SERPL-MCNC: 1 MG/DL
GLUCOSE SERPL-MCNC: 102 MG/DL
HCT VFR BLD CALC: 39.7 %
HGB BLD-MCNC: 13.3 G/DL
INR PPP: 1.73 RATIO
MCHC RBC-ENTMCNC: 30 PG
MCHC RBC-ENTMCNC: 33.5 G/DL
MCV RBC AUTO: 89.6 FL
PLATELET # BLD AUTO: 198 K/UL
PMV BLD: 11.1 FL
POTASSIUM SERPL-SCNC: 4 MMOL/L
PROT SERPL-MCNC: 6.4 G/DL
PT BLD: 19.8 SEC
RBC # BLD: 4.43 M/UL
RBC # FLD: 14.5 %
SODIUM SERPL-SCNC: 141 MMOL/L
WBC # FLD AUTO: 5.43 K/UL

## 2018-12-10 DIAGNOSIS — C16.2 MALIGNANT NEOPLASM OF BODY OF STOMACH: ICD-10-CM

## 2019-01-15 LAB
HCT VFR BLD CALC: 34.7 %
HGB BLD-MCNC: 11.9 G/DL
MCHC RBC-ENTMCNC: 28.8 PG
MCHC RBC-ENTMCNC: 34.3 G/DL
MCV RBC AUTO: 84 FL
PLATELET # BLD AUTO: 103 K/UL
PMV BLD: 11.7 FL
RBC # BLD: 4.13 M/UL
RBC # FLD: 17.7 %
WBC # FLD AUTO: 3.27 K/UL

## 2019-02-27 NOTE — ASU PREOP CHECKLIST - BP NONINVASIVE SYSTOLIC (MM HG)
+ mild nasal congestion with clear drainage 129 bilateral TM with cerumen partially obscuring TM, unable to visualize ear tube in right ear d/t cerumen

## 2019-03-07 ENCOUNTER — APPOINTMENT (OUTPATIENT)
Dept: HEMATOLOGY ONCOLOGY | Facility: CLINIC | Age: 79
End: 2019-03-07

## 2019-03-21 ENCOUNTER — APPOINTMENT (OUTPATIENT)
Dept: HEMATOLOGY ONCOLOGY | Facility: CLINIC | Age: 79
End: 2019-03-21

## 2019-03-21 ENCOUNTER — LABORATORY RESULT (OUTPATIENT)
Age: 79
End: 2019-03-21

## 2019-03-21 ENCOUNTER — OUTPATIENT (OUTPATIENT)
Dept: OUTPATIENT SERVICES | Facility: HOSPITAL | Age: 79
LOS: 1 days | Discharge: HOME | End: 2019-03-21

## 2019-03-21 VITALS
TEMPERATURE: 97 F | HEART RATE: 93 BPM | BODY MASS INDEX: 27.49 KG/M2 | DIASTOLIC BLOOD PRESSURE: 93 MMHG | WEIGHT: 192 LBS | HEIGHT: 70 IN | RESPIRATION RATE: 18 BRPM | SYSTOLIC BLOOD PRESSURE: 135 MMHG

## 2019-03-21 DIAGNOSIS — Z90.49 ACQUIRED ABSENCE OF OTHER SPECIFIED PARTS OF DIGESTIVE TRACT: Chronic | ICD-10-CM

## 2019-03-21 DIAGNOSIS — Z98.890 OTHER SPECIFIED POSTPROCEDURAL STATES: Chronic | ICD-10-CM

## 2019-03-21 LAB
HCT VFR BLD CALC: 39.1 %
HGB BLD-MCNC: 12.8 G/DL
LDH SERPL-CCNC: 388
MCHC RBC-ENTMCNC: 29.4 PG
MCHC RBC-ENTMCNC: 32.7 G/DL
MCV RBC AUTO: 89.9 FL
PLATELET # BLD AUTO: 166 K/UL
PMV BLD: 11.4 FL
RBC # BLD: 4.35 M/UL
RBC # FLD: 13.8 %
WBC # FLD AUTO: 5.67 K/UL

## 2019-03-21 NOTE — ASSESSMENT
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 \par She is recommended surveillance EGD , repeat blood work and PET scan . \par Recombinant Zoster vaccine ( history of Zoster ) .follow up in 4 months .

## 2019-03-21 NOTE — PHYSICAL EXAM
[Normal] : no peripheral adenopathy appreciated [de-identified] : well preserved pleasant female in no acute distress.

## 2019-03-21 NOTE — HISTORY OF PRESENT ILLNESS
[de-identified] : 77 year old white female with PMH of hypertension , CAD s/p MI and angioplasty , atrial fibrillation on xarelto .  long history of PUD and GE reflux symptoms , never had endoscopy until recently when she presented with epigastric pain and melanotic stools for 2 days . EGD showed gastric mass , biopsy consistent with low grade MALT . H pylori negative. She did not require transfusions and was discharged after symptoms resolved. She denies nausea, vomiting , weight loss, fever , night sweats. SHe has dyspnea on moderate exertion and mild peripheral edema. She is active , lives alone , drives and requires no assistance with daily activity . * months ago she was treated for severe zoster involving left thoracic dermatome and mild residual itching . one month ago she had pneumonia not requiring hospitalization . No mammograms in years , last colonoscopy during recent admission was negative .  [de-identified] : 05/24/2018 : Norma returns for newly diagnosed low grade MALT lymphoma of stomach ( H pylori negative ? ) . She is on po iron for iron deficiency anemia . blood work notable for elevated beta 2 microglobulin ( over 4 ) with normal creatinine and  LDH . Weak paraprotein on SPEP , slightly elevated alk Phos ( GGT pending ) . she denies any recurrence of bleeding . CT scan shows no evidence of disease . \par \par 06/28/2018 : Patient returns for gastric lymphoma, PET scan shows multiple areas of FDG avid disease involving lymph nodes and bone including right iliac bone. She complains only of mild fatigue , no hematochezia, fever , sweats or bone pain . Bone marrow is negative . She is on po iron . \par \par 07/19/2018 : Patient returns after cycle 1 of BR given with dose reduction and day 1 and 8 schedule , she tolerated treatment well except for mild rash on the palms, no nausea, vomiting or mucositis. weight is stable , she denies abdominal pain or hematochezia.  \par \par 08/09/2018 : patient returns after cycle 2 of BR , mando given at 70mg/m2 . She tolerated treatment except for increasing fatigue . She denies, fever , weight loss , abdominal pain , nausea, diarrhea or stomatitis. \par \par 09/06/2018 : Patient returns for follow up after cycle 3 of BR , given in weekly schedule . she complains only of mild fatigue. otherwise tolerated treatment extremely well . \par \par 09/27/2018 Patient returns after cycle 4 , she feels exhausted and feels depressed as per family , she complains of insomnia . She denies weight loss, nausea, hematochezia . PET scan shows complete resolution of all FDG avid sites including lymph nodes and bone . \par \par 11/29/2018 : Patient returns for history of gastric lymphoma , stage 4 s/p chemoimmunotherapy . She feels well except for mild easy fatigability and decreased appetite, no weight loss, nausea or vomiting . She slept for 2 days after one dose of mirtazipine . \par \par 03/21/2019 : Patient returns for follow up for low grade MALT stage 4 s/p BR , She feels well and denies B symptoms, abdominal pain , melena or hematochezia .She was not seen by GI yet .

## 2019-04-04 DIAGNOSIS — C16.2 MALIGNANT NEOPLASM OF BODY OF STOMACH: ICD-10-CM

## 2019-04-09 ENCOUNTER — FORM ENCOUNTER (OUTPATIENT)
Age: 79
End: 2019-04-09

## 2019-04-10 ENCOUNTER — OUTPATIENT (OUTPATIENT)
Dept: OUTPATIENT SERVICES | Facility: HOSPITAL | Age: 79
LOS: 1 days | Discharge: HOME | End: 2019-04-10
Payer: MEDICARE

## 2019-04-10 DIAGNOSIS — Z98.890 OTHER SPECIFIED POSTPROCEDURAL STATES: Chronic | ICD-10-CM

## 2019-04-10 DIAGNOSIS — Z90.49 ACQUIRED ABSENCE OF OTHER SPECIFIED PARTS OF DIGESTIVE TRACT: Chronic | ICD-10-CM

## 2019-04-10 DIAGNOSIS — C88.4 EXTRANODAL MARGINAL ZONE B-CELL LYMPHOMA OF MUCOSA-ASSOCIATED LYMPHOID TISSUE [MALT-LYMPHOMA]: ICD-10-CM

## 2019-04-10 LAB — GLUCOSE BLDC GLUCOMTR-MCNC: 105 MG/DL — HIGH (ref 70–99)

## 2019-04-10 PROCEDURE — 78815 PET IMAGE W/CT SKULL-THIGH: CPT | Mod: 26,PS

## 2019-07-18 ENCOUNTER — APPOINTMENT (OUTPATIENT)
Dept: HEMATOLOGY ONCOLOGY | Facility: CLINIC | Age: 79
End: 2019-07-18
Payer: MEDICARE

## 2019-07-18 ENCOUNTER — LABORATORY RESULT (OUTPATIENT)
Age: 79
End: 2019-07-18

## 2019-07-18 VITALS
WEIGHT: 186 LBS | SYSTOLIC BLOOD PRESSURE: 136 MMHG | BODY MASS INDEX: 26.63 KG/M2 | HEART RATE: 77 BPM | HEIGHT: 70 IN | TEMPERATURE: 96.3 F | RESPIRATION RATE: 18 BRPM | DIASTOLIC BLOOD PRESSURE: 95 MMHG

## 2019-07-18 DIAGNOSIS — Z00.00 ENCOUNTER FOR GENERAL ADULT MEDICAL EXAMINATION W/OUT ABNORMAL FINDINGS: ICD-10-CM

## 2019-07-18 PROCEDURE — 99214 OFFICE O/P EST MOD 30 MIN: CPT

## 2019-07-18 NOTE — ASSESSMENT
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 \par refused EGD ,  weight loss ? \par Recombinant Zoster vaccine ( history of Zoster ) .\par repeat blood work , follow up in 3 months .

## 2019-07-18 NOTE — PHYSICAL EXAM
[Normal] : no peripheral adenopathy appreciated [de-identified] : well preserved pleasant female in no acute distress.

## 2019-07-18 NOTE — HISTORY OF PRESENT ILLNESS
[de-identified] : 77 year old white female with PMH of hypertension , CAD s/p MI and angioplasty , atrial fibrillation on xarelto .  long history of PUD and GE reflux symptoms , never had endoscopy until recently when she presented with epigastric pain and melanotic stools for 2 days . EGD showed gastric mass , biopsy consistent with low grade MALT . H pylori negative. She did not require transfusions and was discharged after symptoms resolved. She denies nausea, vomiting , weight loss, fever , night sweats. SHe has dyspnea on moderate exertion and mild peripheral edema. She is active , lives alone , drives and requires no assistance with daily activity . * months ago she was treated for severe zoster involving left thoracic dermatome and mild residual itching . one month ago she had pneumonia not requiring hospitalization . No mammograms in years , last colonoscopy during recent admission was negative .  [de-identified] : 05/24/2018 : Norma returns for newly diagnosed low grade MALT lymphoma of stomach ( H pylori negative ? ) . She is on po iron for iron deficiency anemia . blood work notable for elevated beta 2 microglobulin ( over 4 ) with normal creatinine and  LDH . Weak paraprotein on SPEP , slightly elevated alk Phos ( GGT pending ) . she denies any recurrence of bleeding . CT scan shows no evidence of disease . \par \par 06/28/2018 : Patient returns for gastric lymphoma, PET scan shows multiple areas of FDG avid disease involving lymph nodes and bone including right iliac bone. She complains only of mild fatigue , no hematochezia, fever , sweats or bone pain . Bone marrow is negative . She is on po iron . \par \par 07/19/2018 : Patient returns after cycle 1 of BR given with dose reduction and day 1 and 8 schedule , she tolerated treatment well except for mild rash on the palms, no nausea, vomiting or mucositis. weight is stable , she denies abdominal pain or hematochezia.  \par \par 08/09/2018 : patient returns after cycle 2 of BR , mando given at 70mg/m2 . She tolerated treatment except for increasing fatigue . She denies, fever , weight loss , abdominal pain , nausea, diarrhea or stomatitis. \par \par 09/06/2018 : Patient returns for follow up after cycle 3 of BR , given in weekly schedule . she complains only of mild fatigue. otherwise tolerated treatment extremely well . \par \par 09/27/2018 Patient returns after cycle 4 , she feels exhausted and feels depressed as per family , she complains of insomnia . She denies weight loss, nausea, hematochezia . PET scan shows complete resolution of all FDG avid sites including lymph nodes and bone . \par \par 11/29/2018 : Patient returns for history of gastric lymphoma , stage 4 s/p chemoimmunotherapy . She feels well except for mild easy fatigability and decreased appetite, no weight loss, nausea or vomiting . She slept for 2 days after one dose of mirtazipine . \par \par 03/21/2019 : Patient returns for follow up for low grade MALT stage 4 s/p BR , She feels well and denies B symptoms, abdominal pain , melena or hematochezia .She was not seen by GI yet . \par \par 07/18/2019 Patient returns for follow up , she offers no new complaints, as per daughter her appetite is poor and has lost 6 lbs . Last scan was negative and is refusing repeat EGD , she denies pain , nausea, vomiting or hematochezia.

## 2019-07-19 ENCOUNTER — OTHER (OUTPATIENT)
Age: 79
End: 2019-07-19

## 2019-07-19 LAB
ALBUMIN SERPL ELPH-MCNC: 4.1 G/DL
ALP BLD-CCNC: 143 U/L
ALT SERPL-CCNC: 14 U/L
ANION GAP SERPL CALC-SCNC: 14 MMOL/L
AST SERPL-CCNC: 28 U/L
BILIRUB SERPL-MCNC: 0.4 MG/DL
BUN SERPL-MCNC: 21 MG/DL
CALCIUM SERPL-MCNC: 9.9 MG/DL
CHLORIDE SERPL-SCNC: 102 MMOL/L
CO2 SERPL-SCNC: 25 MMOL/L
CREAT SERPL-MCNC: 1.2 MG/DL
GLUCOSE SERPL-MCNC: 112 MG/DL
HCT VFR BLD CALC: 41.8 %
HGB BLD-MCNC: 13.7 G/DL
LDH SERPL-CCNC: 389
MCHC RBC-ENTMCNC: 29 PG
MCHC RBC-ENTMCNC: 32.8 G/DL
MCV RBC AUTO: 88.4 FL
PLATELET # BLD AUTO: 189 K/UL
PMV BLD: 12.1 FL
POTASSIUM SERPL-SCNC: 4.9 MMOL/L
PROT SERPL-MCNC: 7.1 G/DL
RBC # BLD: 4.73 M/UL
RBC # FLD: 14.1 %
SODIUM SERPL-SCNC: 141 MMOL/L
WBC # FLD AUTO: 6.99 K/UL

## 2019-08-14 ENCOUNTER — FORM ENCOUNTER (OUTPATIENT)
Age: 79
End: 2019-08-14

## 2019-08-15 ENCOUNTER — OUTPATIENT (OUTPATIENT)
Dept: OUTPATIENT SERVICES | Facility: HOSPITAL | Age: 79
LOS: 1 days | Discharge: HOME | End: 2019-08-15
Payer: MEDICARE

## 2019-08-15 DIAGNOSIS — Z98.890 OTHER SPECIFIED POSTPROCEDURAL STATES: Chronic | ICD-10-CM

## 2019-08-15 DIAGNOSIS — Z90.49 ACQUIRED ABSENCE OF OTHER SPECIFIED PARTS OF DIGESTIVE TRACT: Chronic | ICD-10-CM

## 2019-08-15 DIAGNOSIS — C88.4 EXTRANODAL MARGINAL ZONE B-CELL LYMPHOMA OF MUCOSA-ASSOCIATED LYMPHOID TISSUE [MALT-LYMPHOMA]: ICD-10-CM

## 2019-08-15 LAB — GLUCOSE BLDC GLUCOMTR-MCNC: 103 MG/DL — HIGH (ref 70–99)

## 2019-08-15 PROCEDURE — 78815 PET IMAGE W/CT SKULL-THIGH: CPT | Mod: 26,PS

## 2019-08-22 ENCOUNTER — OUTPATIENT (OUTPATIENT)
Dept: OUTPATIENT SERVICES | Facility: HOSPITAL | Age: 79
LOS: 1 days | Discharge: HOME | End: 2019-08-22

## 2019-08-22 VITALS
SYSTOLIC BLOOD PRESSURE: 131 MMHG | DIASTOLIC BLOOD PRESSURE: 67 MMHG | TEMPERATURE: 97 F | HEART RATE: 74 BPM | WEIGHT: 179.9 LBS | RESPIRATION RATE: 18 BRPM | HEIGHT: 66 IN | OXYGEN SATURATION: 96 %

## 2019-08-22 VITALS — DIASTOLIC BLOOD PRESSURE: 69 MMHG | OXYGEN SATURATION: 97 % | HEART RATE: 72 BPM | SYSTOLIC BLOOD PRESSURE: 121 MMHG

## 2019-08-22 DIAGNOSIS — Z98.890 OTHER SPECIFIED POSTPROCEDURAL STATES: Chronic | ICD-10-CM

## 2019-08-22 DIAGNOSIS — Z90.49 ACQUIRED ABSENCE OF OTHER SPECIFIED PARTS OF DIGESTIVE TRACT: Chronic | ICD-10-CM

## 2019-08-22 NOTE — ASU PATIENT PROFILE, ADULT - VISION (WITH CORRECTIVE LENSES IF THE PATIENT USUALLY WEARS THEM):
Addended by: COREY LARKIN on: 10/7/2018 12:05 PM     Modules accepted: Gerri, SmartSet    
Partially impaired: cannot see medication labels or newsprint, but can see obstacles in path, and the surrounding layout; can count fingers at arm's length

## 2019-08-22 NOTE — ASU PATIENT PROFILE, ADULT - PATIENT'S HEIGHT AND WEIGHT RECORDED IN THE VITAL SIGNS FLOWSHEET
- Emergent intubation was needed to save patients life in acute resp distress  - May have aspirated  -Fever overnight, mild WBC elevations, thick sputum, CXR with new consolidation, Procal 0.4  - DDx Aspiration pneumonitis, pleural effusion  -Will cover with broad spectrum antibiotics, Follow cultures   yes

## 2019-08-22 NOTE — ASU PATIENT PROFILE, ADULT - PMH
Atrial fibrillation    CAD S/P percutaneous coronary angioplasty    Diverticulitis    History of stomach cancer  25 years ago-- had stomach surgery  HTN (hypertension)    Hyperlipidemia

## 2019-08-28 DIAGNOSIS — Z95.1 PRESENCE OF AORTOCORONARY BYPASS GRAFT: ICD-10-CM

## 2019-08-28 DIAGNOSIS — Z88.0 ALLERGY STATUS TO PENICILLIN: ICD-10-CM

## 2019-08-28 DIAGNOSIS — I48.91 UNSPECIFIED ATRIAL FIBRILLATION: ICD-10-CM

## 2019-08-28 DIAGNOSIS — Z79.82 LONG TERM (CURRENT) USE OF ASPIRIN: ICD-10-CM

## 2019-08-28 DIAGNOSIS — I10 ESSENTIAL (PRIMARY) HYPERTENSION: ICD-10-CM

## 2019-08-28 DIAGNOSIS — Z79.01 LONG TERM (CURRENT) USE OF ANTICOAGULANTS: ICD-10-CM

## 2019-08-28 DIAGNOSIS — E78.00 PURE HYPERCHOLESTEROLEMIA, UNSPECIFIED: ICD-10-CM

## 2019-08-28 DIAGNOSIS — H25.11 AGE-RELATED NUCLEAR CATARACT, RIGHT EYE: ICD-10-CM

## 2019-08-28 DIAGNOSIS — I25.10 ATHEROSCLEROTIC HEART DISEASE OF NATIVE CORONARY ARTERY WITHOUT ANGINA PECTORIS: ICD-10-CM

## 2019-08-29 ENCOUNTER — OUTPATIENT (OUTPATIENT)
Dept: OUTPATIENT SERVICES | Facility: HOSPITAL | Age: 79
LOS: 1 days | Discharge: HOME | End: 2019-08-29

## 2019-08-29 VITALS
SYSTOLIC BLOOD PRESSURE: 127 MMHG | WEIGHT: 179.9 LBS | OXYGEN SATURATION: 98 % | TEMPERATURE: 96 F | RESPIRATION RATE: 16 BRPM | DIASTOLIC BLOOD PRESSURE: 58 MMHG | HEART RATE: 82 BPM | HEIGHT: 66 IN

## 2019-08-29 VITALS — SYSTOLIC BLOOD PRESSURE: 133 MMHG | RESPIRATION RATE: 18 BRPM | DIASTOLIC BLOOD PRESSURE: 84 MMHG | HEART RATE: 70 BPM

## 2019-08-29 DIAGNOSIS — Z90.49 ACQUIRED ABSENCE OF OTHER SPECIFIED PARTS OF DIGESTIVE TRACT: Chronic | ICD-10-CM

## 2019-08-29 DIAGNOSIS — Z98.890 OTHER SPECIFIED POSTPROCEDURAL STATES: Chronic | ICD-10-CM

## 2019-08-29 RX ORDER — ACETAMINOPHEN 500 MG
650 TABLET ORAL ONCE
Refills: 0 | Status: DISCONTINUED | OUTPATIENT
Start: 2019-08-29 | End: 2019-09-14

## 2019-08-29 RX ORDER — ONDANSETRON 8 MG/1
4 TABLET, FILM COATED ORAL ONCE
Refills: 0 | Status: DISCONTINUED | OUTPATIENT
Start: 2019-08-29 | End: 2019-09-14

## 2019-08-29 RX ORDER — SODIUM CHLORIDE 9 MG/ML
1000 INJECTION INTRAMUSCULAR; INTRAVENOUS; SUBCUTANEOUS
Refills: 0 | Status: DISCONTINUED | OUTPATIENT
Start: 2019-08-29 | End: 2019-09-14

## 2019-08-29 NOTE — PRE-ANESTHESIA EVALUATION ADULT - NSANTHPMHFT_GEN_ALL_CORE
Chart reviewed, pt interviewed and examined. Chart reviewed, pt interviewed and examined. CAD S/P PCI, and 4 stents.

## 2019-09-05 DIAGNOSIS — I10 ESSENTIAL (PRIMARY) HYPERTENSION: ICD-10-CM

## 2019-09-05 DIAGNOSIS — Z88.0 ALLERGY STATUS TO PENICILLIN: ICD-10-CM

## 2019-09-05 DIAGNOSIS — I25.10 ATHEROSCLEROTIC HEART DISEASE OF NATIVE CORONARY ARTERY WITHOUT ANGINA PECTORIS: ICD-10-CM

## 2019-09-05 DIAGNOSIS — H25.12 AGE-RELATED NUCLEAR CATARACT, LEFT EYE: ICD-10-CM

## 2019-10-17 ENCOUNTER — OUTPATIENT (OUTPATIENT)
Dept: OUTPATIENT SERVICES | Facility: HOSPITAL | Age: 79
LOS: 1 days | Discharge: HOME | End: 2019-10-17

## 2019-10-17 ENCOUNTER — LABORATORY RESULT (OUTPATIENT)
Age: 79
End: 2019-10-17

## 2019-10-17 ENCOUNTER — APPOINTMENT (OUTPATIENT)
Dept: HEMATOLOGY ONCOLOGY | Facility: CLINIC | Age: 79
End: 2019-10-17
Payer: MEDICARE

## 2019-10-17 VITALS
HEART RATE: 62 BPM | TEMPERATURE: 97.1 F | WEIGHT: 181 LBS | SYSTOLIC BLOOD PRESSURE: 115 MMHG | DIASTOLIC BLOOD PRESSURE: 72 MMHG | BODY MASS INDEX: 25.91 KG/M2 | HEIGHT: 70 IN | RESPIRATION RATE: 18 BRPM

## 2019-10-17 DIAGNOSIS — C88.4 EXTRANODAL MARGINAL ZONE B-CELL LYMPHOMA OF MUCOSA-ASSOCIATED LYMPHOID TISSUE [MALT-LYMPHOMA]: ICD-10-CM

## 2019-10-17 DIAGNOSIS — Z90.49 ACQUIRED ABSENCE OF OTHER SPECIFIED PARTS OF DIGESTIVE TRACT: Chronic | ICD-10-CM

## 2019-10-17 DIAGNOSIS — Z98.890 OTHER SPECIFIED POSTPROCEDURAL STATES: Chronic | ICD-10-CM

## 2019-10-17 PROBLEM — E78.5 HYPERLIPIDEMIA, UNSPECIFIED: Chronic | Status: ACTIVE | Noted: 2019-08-22

## 2019-10-17 PROBLEM — Z85.028 PERSONAL HISTORY OF OTHER MALIGNANT NEOPLASM OF STOMACH: Chronic | Status: ACTIVE | Noted: 2019-08-22

## 2019-10-17 LAB
ALBUMIN SERPL ELPH-MCNC: 4.1 G/DL
ALP BLD-CCNC: 128 U/L
ALT SERPL-CCNC: 14 U/L
ANION GAP SERPL CALC-SCNC: 15 MMOL/L
AST SERPL-CCNC: 25 U/L
BILIRUB SERPL-MCNC: 0.3 MG/DL
BUN SERPL-MCNC: 21 MG/DL
CALCIUM SERPL-MCNC: 9.8 MG/DL
CHLORIDE SERPL-SCNC: 106 MMOL/L
CO2 SERPL-SCNC: 22 MMOL/L
CREAT SERPL-MCNC: 1.4 MG/DL
ERYTHROCYTE [SEDIMENTATION RATE] IN BLOOD BY WESTERGREN METHOD: 44 MM/HR
GLUCOSE SERPL-MCNC: 95 MG/DL
HCT VFR BLD CALC: 41.9 %
HGB BLD-MCNC: 13.7 G/DL
LDH SERPL-CCNC: 363
MCHC RBC-ENTMCNC: 28.5 PG
MCHC RBC-ENTMCNC: 32.7 G/DL
MCV RBC AUTO: 87.3 FL
PLATELET # BLD AUTO: 183 K/UL
PMV BLD: 10.9 FL
POTASSIUM SERPL-SCNC: 4.3 MMOL/L
PROT SERPL-MCNC: 6.9 G/DL
RBC # BLD: 4.8 M/UL
RBC # FLD: 14.6 %
SODIUM SERPL-SCNC: 143 MMOL/L
WBC # FLD AUTO: 5.05 K/UL

## 2019-10-17 PROCEDURE — 99214 OFFICE O/P EST MOD 30 MIN: CPT

## 2019-10-17 RX ORDER — CLOTRIMAZOLE 1% ANTIFUNGAL CREAM 1 G/100G
1 CREAM TOPICAL TWICE DAILY
Qty: 1 | Refills: 1 | Status: ACTIVE | COMMUNITY
Start: 2019-10-17 | End: 1900-01-01

## 2019-10-17 NOTE — PHYSICAL EXAM
[Normal] : clear to auscultation bilaterally, no dullness, no wheezing [de-identified] : well preserved pleasant female in no acute distress.  [de-identified] : rash on inframammary folds L>R

## 2019-10-17 NOTE — ASSESSMENT
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 \par refused EGD ,  weight loss ? elevated LDH \par Recombinant Zoster vaccine ( history of Zoster ) .\par repeat blood work , follow up in 3 months . Also given lotrisone for rash . \par check urine culture , consider urology evaluation for incontinence .

## 2019-10-17 NOTE — HISTORY OF PRESENT ILLNESS
[de-identified] : 77 year old white female with PMH of hypertension , CAD s/p MI and angioplasty , atrial fibrillation on xarelto .  long history of PUD and GE reflux symptoms , never had endoscopy until recently when she presented with epigastric pain and melanotic stools for 2 days . EGD showed gastric mass , biopsy consistent with low grade MALT . H pylori negative. She did not require transfusions and was discharged after symptoms resolved. She denies nausea, vomiting , weight loss, fever , night sweats. SHe has dyspnea on moderate exertion and mild peripheral edema. She is active , lives alone , drives and requires no assistance with daily activity . * months ago she was treated for severe zoster involving left thoracic dermatome and mild residual itching . one month ago she had pneumonia not requiring hospitalization . No mammograms in years , last colonoscopy during recent admission was negative .  [de-identified] : 05/24/2018 : Norma returns for newly diagnosed low grade MALT lymphoma of stomach ( H pylori negative ? ) . She is on po iron for iron deficiency anemia . blood work notable for elevated beta 2 microglobulin ( over 4 ) with normal creatinine and  LDH . Weak paraprotein on SPEP , slightly elevated alk Phos ( GGT pending ) . she denies any recurrence of bleeding . CT scan shows no evidence of disease . \par \par 06/28/2018 : Patient returns for gastric lymphoma, PET scan shows multiple areas of FDG avid disease involving lymph nodes and bone including right iliac bone. She complains only of mild fatigue , no hematochezia, fever , sweats or bone pain . Bone marrow is negative . She is on po iron . \par \par 07/19/2018 : Patient returns after cycle 1 of BR given with dose reduction and day 1 and 8 schedule , she tolerated treatment well except for mild rash on the palms, no nausea, vomiting or mucositis. weight is stable , she denies abdominal pain or hematochezia.  She reports urinary frequency , incontinence and dysuria for at least 2 weeks . \par \par 08/09/2018 : patient returns after cycle 2 of BR , mando given at 70mg/m2 . She tolerated treatment except for increasing fatigue . She denies, fever , weight loss , abdominal pain , nausea, diarrhea or stomatitis. \par \par 09/06/2018 : Patient returns for follow up after cycle 3 of BR , given in weekly schedule . she complains only of mild fatigue. otherwise tolerated treatment extremely well . \par \par 09/27/2018 Patient returns after cycle 4 , she feels exhausted and feels depressed as per family , she complains of insomnia . She denies weight loss, nausea, hematochezia . PET scan shows complete resolution of all FDG avid sites including lymph nodes and bone . \par \par 11/29/2018 : Patient returns for history of gastric lymphoma , stage 4 s/p chemoimmunotherapy . She feels well except for mild easy fatigability and decreased appetite, no weight loss, nausea or vomiting . She slept for 2 days after one dose of mirtazipine . \par \par 03/21/2019 : Patient returns for follow up for low grade MALT stage 4 s/p BR , She feels well and denies B symptoms, abdominal pain , melena or hematochezia .She was not seen by GI yet . \par \par 07/18/2019 Patient returns for follow up , she offers no new complaints, as per daughter her appetite is poor and has lost 6 lbs . Last scan was negative and is refusing repeat EGD , she denies pain , nausea, vomiting or hematochezia. \par \par 10/17/2019 Patient returns for follow up , she feels slightly better , more active and improved appetite . However she has lost few lbs . she continues on iron , she denies nausea , vomiting , abdominal pain or hematochezia. no fever or night sweats.

## 2019-10-18 LAB — FERRITIN SERPL-MCNC: 167 NG/ML

## 2019-10-18 RX ORDER — CIPROFLOXACIN HYDROCHLORIDE 250 MG/1
250 TABLET, FILM COATED ORAL
Qty: 14 | Refills: 0 | Status: ACTIVE | COMMUNITY
Start: 2019-10-18 | End: 1900-01-01

## 2020-01-14 ENCOUNTER — APPOINTMENT (OUTPATIENT)
Dept: HEMATOLOGY ONCOLOGY | Facility: CLINIC | Age: 80
End: 2020-01-14

## 2020-04-25 ENCOUNTER — INPATIENT (INPATIENT)
Facility: HOSPITAL | Age: 80
LOS: 10 days | Discharge: NURSING, REHAB OR LONG-TERM CARE | End: 2020-05-06
Attending: INTERNAL MEDICINE | Admitting: INTERNAL MEDICINE
Payer: MEDICARE

## 2020-04-25 VITALS
OXYGEN SATURATION: 93 % | HEART RATE: 193 BPM | RESPIRATION RATE: 24 BRPM | TEMPERATURE: 101 F | SYSTOLIC BLOOD PRESSURE: 120 MMHG | DIASTOLIC BLOOD PRESSURE: 78 MMHG

## 2020-04-25 DIAGNOSIS — Z90.49 ACQUIRED ABSENCE OF OTHER SPECIFIED PARTS OF DIGESTIVE TRACT: Chronic | ICD-10-CM

## 2020-04-25 DIAGNOSIS — Z98.890 OTHER SPECIFIED POSTPROCEDURAL STATES: Chronic | ICD-10-CM

## 2020-04-25 LAB
ALBUMIN SERPL ELPH-MCNC: 3.5 G/DL — SIGNIFICANT CHANGE UP (ref 3.5–5.2)
ALP SERPL-CCNC: 158 U/L — HIGH (ref 30–115)
ALT FLD-CCNC: 17 U/L — SIGNIFICANT CHANGE UP (ref 0–41)
ANION GAP SERPL CALC-SCNC: 15 MMOL/L — HIGH (ref 7–14)
AST SERPL-CCNC: 22 U/L — SIGNIFICANT CHANGE UP (ref 0–41)
BASE EXCESS BLDV CALC-SCNC: 5.4 MMOL/L — HIGH (ref -2–2)
BILIRUB SERPL-MCNC: 1.5 MG/DL — HIGH (ref 0.2–1.2)
BUN SERPL-MCNC: 18 MG/DL — SIGNIFICANT CHANGE UP (ref 10–20)
CA-I SERPL-SCNC: 1.09 MMOL/L — LOW (ref 1.12–1.3)
CALCIUM SERPL-MCNC: 8.9 MG/DL — SIGNIFICANT CHANGE UP (ref 8.5–10.1)
CHLORIDE SERPL-SCNC: 95 MMOL/L — LOW (ref 98–110)
CK SERPL-CCNC: 190 U/L — SIGNIFICANT CHANGE UP (ref 0–225)
CO2 SERPL-SCNC: 26 MMOL/L — SIGNIFICANT CHANGE UP (ref 17–32)
CREAT SERPL-MCNC: 1.5 MG/DL — SIGNIFICANT CHANGE UP (ref 0.7–1.5)
D DIMER BLD IA.RAPID-MCNC: 1305 NG/ML DDU — HIGH (ref 0–230)
GAS PNL BLDV: 129 MMOL/L — LOW (ref 136–145)
GAS PNL BLDV: SIGNIFICANT CHANGE UP
GAS PNL BLDV: SIGNIFICANT CHANGE UP
GLUCOSE SERPL-MCNC: 95 MG/DL — SIGNIFICANT CHANGE UP (ref 70–99)
HCO3 BLDV-SCNC: 30 MMOL/L — HIGH (ref 22–29)
HCT VFR BLD CALC: 40.7 % — SIGNIFICANT CHANGE UP (ref 37–47)
HCT VFR BLDA CALC: 42.1 % — SIGNIFICANT CHANGE UP (ref 34–44)
HGB BLD CALC-MCNC: 13.7 G/DL — LOW (ref 14–18)
HGB BLD-MCNC: 13.4 G/DL — SIGNIFICANT CHANGE UP (ref 12–16)
INR BLD: 1.64 RATIO — HIGH (ref 0.65–1.3)
LACTATE BLDV-MCNC: 2.1 MMOL/L — HIGH (ref 0.5–1.6)
LDH SERPL L TO P-CCNC: 251 — HIGH (ref 50–242)
MAGNESIUM SERPL-MCNC: 1.8 MG/DL — SIGNIFICANT CHANGE UP (ref 1.8–2.4)
MCHC RBC-ENTMCNC: 27.2 PG — SIGNIFICANT CHANGE UP (ref 27–31)
MCHC RBC-ENTMCNC: 32.9 G/DL — SIGNIFICANT CHANGE UP (ref 32–37)
MCV RBC AUTO: 82.7 FL — SIGNIFICANT CHANGE UP (ref 81–99)
NRBC # BLD: 0 /100 WBCS — SIGNIFICANT CHANGE UP (ref 0–0)
NT-PROBNP SERPL-SCNC: HIGH PG/ML (ref 0–300)
PCO2 BLDV: 41 MMHG — SIGNIFICANT CHANGE UP (ref 41–51)
PH BLDV: 7.47 — HIGH (ref 7.26–7.43)
PHOSPHATE SERPL-MCNC: 3.9 MG/DL — SIGNIFICANT CHANGE UP (ref 2.1–4.9)
PLATELET # BLD AUTO: 186 K/UL — SIGNIFICANT CHANGE UP (ref 130–400)
PO2 BLDV: 28 MMHG — SIGNIFICANT CHANGE UP (ref 20–40)
POTASSIUM BLDV-SCNC: 3.7 MMOL/L — SIGNIFICANT CHANGE UP (ref 3.3–5.6)
POTASSIUM SERPL-MCNC: 4 MMOL/L — SIGNIFICANT CHANGE UP (ref 3.5–5)
POTASSIUM SERPL-SCNC: 4 MMOL/L — SIGNIFICANT CHANGE UP (ref 3.5–5)
PROT SERPL-MCNC: 6 G/DL — SIGNIFICANT CHANGE UP (ref 6–8)
PROTHROM AB SERPL-ACNC: 18.9 SEC — HIGH (ref 9.95–12.87)
RBC # BLD: 4.92 M/UL — SIGNIFICANT CHANGE UP (ref 4.2–5.4)
RBC # FLD: 18.4 % — HIGH (ref 11.5–14.5)
SAO2 % BLDV: 47 % — SIGNIFICANT CHANGE UP
SARS-COV-2 RNA SPEC QL NAA+PROBE: SIGNIFICANT CHANGE UP
SODIUM SERPL-SCNC: 136 MMOL/L — SIGNIFICANT CHANGE UP (ref 135–146)
TROPONIN T SERPL-MCNC: 0.04 NG/ML — CRITICAL HIGH
WBC # BLD: 12 K/UL — HIGH (ref 4.8–10.8)
WBC # FLD AUTO: 12 K/UL — HIGH (ref 4.8–10.8)

## 2020-04-25 PROCEDURE — 99053 MED SERV 10PM-8AM 24 HR FAC: CPT

## 2020-04-25 PROCEDURE — 93010 ELECTROCARDIOGRAM REPORT: CPT | Mod: 76

## 2020-04-25 PROCEDURE — 99291 CRITICAL CARE FIRST HOUR: CPT

## 2020-04-25 PROCEDURE — 99223 1ST HOSP IP/OBS HIGH 75: CPT | Mod: AI

## 2020-04-25 PROCEDURE — 71045 X-RAY EXAM CHEST 1 VIEW: CPT | Mod: 26

## 2020-04-25 RX ORDER — FUROSEMIDE 40 MG
40 TABLET ORAL DAILY
Refills: 0 | Status: DISCONTINUED | OUTPATIENT
Start: 2020-04-25 | End: 2020-04-26

## 2020-04-25 RX ORDER — HYDROXYCHLOROQUINE SULFATE 200 MG
800 TABLET ORAL EVERY 24 HOURS
Refills: 0 | Status: DISCONTINUED | OUTPATIENT
Start: 2020-04-25 | End: 2020-04-25

## 2020-04-25 RX ORDER — ALBUTEROL 90 UG/1
2 AEROSOL, METERED ORAL EVERY 4 HOURS
Refills: 0 | Status: DISCONTINUED | OUTPATIENT
Start: 2020-04-25 | End: 2020-05-06

## 2020-04-25 RX ORDER — SODIUM CHLORIDE 9 MG/ML
500 INJECTION INTRAMUSCULAR; INTRAVENOUS; SUBCUTANEOUS ONCE
Refills: 0 | Status: COMPLETED | OUTPATIENT
Start: 2020-04-25 | End: 2020-04-25

## 2020-04-25 RX ORDER — RIVAROXABAN 15 MG-20MG
15 KIT ORAL
Refills: 0 | Status: DISCONTINUED | OUTPATIENT
Start: 2020-04-25 | End: 2020-05-02

## 2020-04-25 RX ORDER — METOPROLOL TARTRATE 50 MG
50 TABLET ORAL
Refills: 0 | Status: DISCONTINUED | OUTPATIENT
Start: 2020-04-25 | End: 2020-04-27

## 2020-04-25 RX ORDER — AZITHROMYCIN 500 MG/1
500 TABLET, FILM COATED ORAL ONCE
Refills: 0 | Status: COMPLETED | OUTPATIENT
Start: 2020-04-25 | End: 2020-04-25

## 2020-04-25 RX ORDER — NYSTATIN CREAM 100000 [USP'U]/G
1 CREAM TOPICAL THREE TIMES A DAY
Refills: 0 | Status: DISCONTINUED | OUTPATIENT
Start: 2020-04-25 | End: 2020-05-06

## 2020-04-25 RX ORDER — CEFTRIAXONE 500 MG/1
1000 INJECTION, POWDER, FOR SOLUTION INTRAMUSCULAR; INTRAVENOUS ONCE
Refills: 0 | Status: COMPLETED | OUTPATIENT
Start: 2020-04-25 | End: 2020-04-25

## 2020-04-25 RX ORDER — PANTOPRAZOLE SODIUM 20 MG/1
40 TABLET, DELAYED RELEASE ORAL
Refills: 0 | Status: DISCONTINUED | OUTPATIENT
Start: 2020-04-25 | End: 2020-05-06

## 2020-04-25 RX ORDER — ASPIRIN/CALCIUM CARB/MAGNESIUM 324 MG
81 TABLET ORAL DAILY
Refills: 0 | Status: DISCONTINUED | OUTPATIENT
Start: 2020-04-25 | End: 2020-05-06

## 2020-04-25 RX ORDER — HYDROXYCHLOROQUINE SULFATE 200 MG
TABLET ORAL
Refills: 0 | Status: DISCONTINUED | OUTPATIENT
Start: 2020-04-25 | End: 2020-04-25

## 2020-04-25 RX ORDER — DILTIAZEM HCL 120 MG
10 CAPSULE, EXT RELEASE 24 HR ORAL ONCE
Refills: 0 | Status: COMPLETED | OUTPATIENT
Start: 2020-04-25 | End: 2020-04-25

## 2020-04-25 RX ORDER — ONDANSETRON 8 MG/1
4 TABLET, FILM COATED ORAL ONCE
Refills: 0 | Status: COMPLETED | OUTPATIENT
Start: 2020-04-25 | End: 2020-04-25

## 2020-04-25 RX ORDER — MAGNESIUM SULFATE 500 MG/ML
2 VIAL (ML) INJECTION ONCE
Refills: 0 | Status: COMPLETED | OUTPATIENT
Start: 2020-04-25 | End: 2020-04-25

## 2020-04-25 RX ORDER — ACETAMINOPHEN 500 MG
650 TABLET ORAL ONCE
Refills: 0 | Status: COMPLETED | OUTPATIENT
Start: 2020-04-25 | End: 2020-04-25

## 2020-04-25 RX ORDER — ONDANSETRON 8 MG/1
4 TABLET, FILM COATED ORAL EVERY 6 HOURS
Refills: 0 | Status: DISCONTINUED | OUTPATIENT
Start: 2020-04-25 | End: 2020-05-06

## 2020-04-25 RX ORDER — HYDROXYCHLOROQUINE SULFATE 200 MG
800 TABLET ORAL EVERY 24 HOURS
Refills: 0 | Status: DISCONTINUED | OUTPATIENT
Start: 2020-04-26 | End: 2020-04-26

## 2020-04-25 RX ORDER — FUROSEMIDE 40 MG
40 TABLET ORAL ONCE
Refills: 0 | Status: COMPLETED | OUTPATIENT
Start: 2020-04-25 | End: 2020-04-25

## 2020-04-25 RX ORDER — ACETAMINOPHEN 500 MG
650 TABLET ORAL EVERY 4 HOURS
Refills: 0 | Status: DISCONTINUED | OUTPATIENT
Start: 2020-04-25 | End: 2020-05-06

## 2020-04-25 RX ORDER — ACETAMINOPHEN 500 MG
975 TABLET ORAL ONCE
Refills: 0 | Status: COMPLETED | OUTPATIENT
Start: 2020-04-25 | End: 2020-04-25

## 2020-04-25 RX ORDER — METOPROLOL TARTRATE 50 MG
50 TABLET ORAL DAILY
Refills: 0 | Status: DISCONTINUED | OUTPATIENT
Start: 2020-04-25 | End: 2020-04-25

## 2020-04-25 RX ADMIN — Medication 50 GRAM(S): at 18:32

## 2020-04-25 RX ADMIN — AZITHROMYCIN 255 MILLIGRAM(S): 500 TABLET, FILM COATED ORAL at 10:01

## 2020-04-25 RX ADMIN — SODIUM CHLORIDE 500 MILLILITER(S): 9 INJECTION INTRAMUSCULAR; INTRAVENOUS; SUBCUTANEOUS at 08:24

## 2020-04-25 RX ADMIN — RIVAROXABAN 15 MILLIGRAM(S): KIT at 17:38

## 2020-04-25 RX ADMIN — SODIUM CHLORIDE 1000 MILLILITER(S): 9 INJECTION INTRAMUSCULAR; INTRAVENOUS; SUBCUTANEOUS at 10:02

## 2020-04-25 RX ADMIN — Medication 40 MILLIGRAM(S): at 08:20

## 2020-04-25 RX ADMIN — ONDANSETRON 4 MILLIGRAM(S): 8 TABLET, FILM COATED ORAL at 17:39

## 2020-04-25 RX ADMIN — Medication 50 MILLIGRAM(S): at 17:38

## 2020-04-25 RX ADMIN — CEFTRIAXONE 100 MILLIGRAM(S): 500 INJECTION, POWDER, FOR SOLUTION INTRAMUSCULAR; INTRAVENOUS at 10:01

## 2020-04-25 RX ADMIN — NYSTATIN CREAM 1 APPLICATION(S): 100000 CREAM TOPICAL at 22:11

## 2020-04-25 RX ADMIN — PANTOPRAZOLE SODIUM 40 MILLIGRAM(S): 20 TABLET, DELAYED RELEASE ORAL at 22:12

## 2020-04-25 RX ADMIN — Medication 10 MILLIGRAM(S): at 07:55

## 2020-04-25 RX ADMIN — SODIUM CHLORIDE 500 MILLILITER(S): 9 INJECTION INTRAMUSCULAR; INTRAVENOUS; SUBCUTANEOUS at 07:56

## 2020-04-25 RX ADMIN — ONDANSETRON 4 MILLIGRAM(S): 8 TABLET, FILM COATED ORAL at 07:56

## 2020-04-25 RX ADMIN — Medication 650 MILLIGRAM(S): at 07:55

## 2020-04-25 RX ADMIN — Medication 81 MILLIGRAM(S): at 13:17

## 2020-04-25 NOTE — H&P ADULT - ASSESSMENT
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 pt did not objectively measure temp at home. But did endorse body aches and general lethargy a/w poor po intake. She noticed some palpitations today which prompted her to come to [facility] ED. She lives w/ her daughter and denies any sick contacts and was compliant w/ social isolation and mostly stayed at home. Denies any N/V/D, orthopnea, PND, dysuria, frequency, urgency, or confusion.  In the ED, vitals:  irregular and Tmax 100.7. Was given x1 azithro and rocephin x1. lasix 40 x1 and Cardizem 10 IV push which rate controlled her at 110 bpm.     #Acute Hypoxic Respiratory failure possibly d/t COVID PNA (pending) w/ possible underlying component of CHF - Stable at 96% on 2L  - Covid swab sent  - PE: bibasilar crackles and reduced breath soudns  - Trop x1: 0.04 - likely demand ischemia  - ProBNP >17,000  - CXR: evidence of b/l opacities and congestion  - will start pt on plaquenil for now  - will hold off abx - low index of suspicion for bacterial superimposed infection  - will get Baseline CRP, Pro-John and other pro inflammatory markers for baselien  - will start IV lasix 40qdaily as pt is lasix naive  - will obtain Echo (to r/o underlying CHF), TSh  - will get baseline EKG. will hold off EPS consult as pt is 80 y/o and reduced sensitivity of monitor  - c/w strict I&O and daily weights  - maintain negative balance  - fluid restricted diet    #Chronic Afib w/ RVR likely d/t Hypoxia - rate controlled  - will resume home metoprolol dose and will increase  - c/w xarelto  - Target SPO2 92-96%  - f/u echo    #CAD s/p PCI  - c/w asa and bb    #HTn  - c/w BB    #CKD 3  - Cr at baseline 1.5    #S4 malt lymphoma  - f/u onc as outpt    #Activityi: OOBc  #Diet; DASh Fluid restricted  #DVT/GI PPX; Xarelto/protonix  #Code: Full

## 2020-04-25 NOTE — ED ADULT NURSE NOTE - NSIMPLEMENTINTERV_GEN_ALL_ED
Implemented All Fall with Harm Risk Interventions:  [place] to call system. Call bell, personal items and telephone within reach. Instruct patient to call for assistance. Room bathroom lighting operational. Non-slip footwear when patient is off stretcher. Physically safe environment: no spills, clutter or unnecessary equipment. Stretcher in lowest position, wheels locked, appropriate side rails in place. Provide visual cue, wrist band, yellow gown, etc. Monitor gait and stability. Monitor for mental status changes and reorient to person, place, and time. Review medications for side effects contributing to fall risk. Reinforce activity limits and safety measures with patient and family. Provide visual clues: red socks.

## 2020-04-25 NOTE — H&P ADULT - ATTENDING COMMENTS
80 YO F with a PMH of CAD s/p PCI (not on DAPT), Afib (Xarelto), HTN, 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), and PUD who presents to the hospital with a c/o fevers for the past x 3 days. Associated with rhinorrhea and non-productive cough. Denies any sore throat, rashes, CP, palpitations, LE swelling, N/V/D, ABD pain, and dysuria. No sick contacts. No recent travel. In the ED, Chest X-ray with Right basilar opacification. Pt was in Afib with RVR which resolved with O2 and Cardizem admin. Hypoxic, placed on NC. Started on IV ABXs (Ceftriaxone/Azithro). Isolated and COVID19 swab sent.     Physical exam shows pt in NAD. VSS, afebrile, not hypoxic on 3L NC (84% on RA). A&Ox3. Non-focal neuro exam. Muscle strength/sensation intact. Rhonchui noted in B/L lung fields. Irregular rhythm. ABD is soft and non-tender, normoactive BSs. LEs without swelling. No rashes. Labs and radiology as above. QTc 433    Fevers + Cough + rhinorrhea likely due to viral respiratory syndrome vs community acquired pneumonia + acute hypoxic respiratory failure, no sepsis currently, COVID19 ruled out. - recent travel. - COVID19 contact. Send CRP, procal, D-dimer, and LDH. Trend CBC, Ck, and LFTs. IV ABXs (Ceftriaxone/Azithro). Gentle IVFs (LR). Vitamin C/Zinc. Start Guaifenesin. APAP PRN. Anti-tussives PRN. Supplemental O2 PRN. C/w statins, if LFTs are not > 75. No NSAIDs.    Leukocytosis, from above. Management as above.     VANI vs CKD3, likely pre-renal; Doubt ATN. Send UA, urine lytes, urine eosinophils, serum phos, and trend BMP. IVFs (LR). Monitor urinary out-put. PTH and Vit D. Hold nephrotoxic agents.     Mild HAGMA from lactic acidosis. Gentle IVFs (LR). Serial Lactate/BMP    Afib with RVR, resolved; now rate-controlled. Send TSH. Echo. Tele. Cardio consult. Restart home meds.     Elevated troponin, from demand. Doubt ACS. Serial cardiac enzymes and EKGs.     Hx of CAD s/p PCI (not on DAPT), HTN, 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), and PUD. Restart home meds. GI and DVT PPX. Inform PCP of pt's admission to hospital. Rest as per above note.

## 2020-04-25 NOTE — ED PROVIDER NOTE - CARE PLAN
Principal Discharge DX:	Fluid overload  Secondary Diagnosis:	Elevated troponin  Secondary Diagnosis:	Fever

## 2020-04-25 NOTE — ED PROVIDER NOTE - PHYSICAL EXAMINATION
Physical Exam    Vital Signs: I have reviewed the initial vital signs.  Constitutional: well-nourished, appears stated age, no acute distress  Cardiovascular: tachycardic, irregular rhythm, well-perfused extremities, radial pulses equal and 2+  Respiratory: tachypneic with b/l wheezing in the upper apices and crackles at the bases. pt is speaking full sentences.   Gastrointestinal: soft, non-tender, nondistended abdomen, no pulsatile mass, no organomegaly.   Musculoskeletal: (+) chronic b/l 4+ pitting edema with bulla at the dorsal aspect of the left foot.    Integumentary: warm, dry, no ecchymosis. no diaphoresis.   Neurologic: a&o x3

## 2020-04-25 NOTE — ED ADULT NURSE NOTE - CHIEF COMPLAINT QUOTE
Pt c/o worsened sob, family called 911 for increased work of breathing. Pt was hypotensive on the scene, BP improved after the bolus.

## 2020-04-25 NOTE — ED PROVIDER NOTE - NS ED ROS FT
CONST: (+) fever. No chills or bodyaches  EYES: No pain, redness, drainage or visual changes.  ENT: No ear pain or discharge, nasal discharge or congestion. No sore throat  CARD: No chest pain, palpitations  RESP: (+) SOB. No cough, hemoptysis. No hx of asthma or COPD  GI: No abdominal pain, N/V/D  : No urinary symptoms  MS: No joint pain, back pain or extremity pain/injury  SKIN: No rashes  NEURO: No headache, dizziness, paresthesias or LOC

## 2020-04-25 NOTE — ED PROVIDER NOTE - OBJECTIVE STATEMENT
80 y/o female with a PMH of CAD on aspirin, with bypass graft, by afib on Xarelto, chronic lower extremity edema, nonHodgkins lymphoma, and malignant neoplasm of the stomach presents to the ED for sob that began today. pt denies chest pain, abdominal pain, recent sick contacts, cough, n/v/d/c, dizziness, lower extremity pain, recent surgeries, recent hospitalizations. 78 y/o female with a PMH of CAD on aspirin, with bypass graft, by afib on Xarelto, chronic lower extremity edema, nonHodgkins lymphoma, and malignant neoplasm of the stomach presents to the ED for sob that began today. pt states she did not take her metoprolol this morning. pt denies chest pain, abdominal pain, recent sick contacts, cough, n/v/d/c, dizziness, lower extremity pain, recent surgeries, recent hospitalizations. 80 y/o female with a PMH of CAD on aspirin, with bypass graft, by afib on Xarelto, chronic lower extremity edema, nonHodgkins lymphoma, and malignant neoplasm of the stomach presents to the ED for sob that began today. pt has decrease po intake. pt states she did not take her metoprolol this morning. pt denies chest pain, abdominal pain, recent sick contacts, cough, n/v/d/c, dizziness, lower extremity pain, recent surgeries, recent hospitalizations. 80 y/o female with a PMH of CAD on aspirin, with bypass graft, by afib on Xarelto, chronic lower extremity edema, nonHodgkins lymphoma, and malignant neoplasm of the stomach presents to the ED for sob that began today. pt has decrease po intake since chemo diagnosis, but yesterday patient did not want to eat at all. pt states she did not take her metoprolol this morning. pt denies chest pain, abdominal pain, recent sick contacts, cough, n/v/d/c, dizziness, lower extremity pain, recent surgeries, recent hospitalizations.

## 2020-04-25 NOTE — ED PROVIDER NOTE - PROGRESS NOTE DETAILS
pt put on 15l on nonrebreather in the field do to low saturation. pt given 250ml of fluids in the field due to hypotension. pt o2 saturation is 98% on RA. pt is rapid afib. pt put on 15l on nonrebreather in the field do to low saturation. pt given 250ml of fluids in the field due to hypotension. pt had an episode of vomiting after being given tylenol. given zofran, and tylenol suppository. pt spo2 decrease to 84 on ra. pt placed on 3L nasal cannula with 98% o2sat spoke with pt regarding admission, agrees. spoke with daughter joseph about admission, would like to be called with updates during admission. daughter states cardiologist is dr. walter or isaura could not remember correctly. oncologist is dr. henderson. daughter states pt is incontinent, and takes supplemnts to increase appetite. spoke to MAR Dr. stone, accepts admission.

## 2020-04-25 NOTE — H&P ADULT - HISTORY OF PRESENT ILLNESS
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 pt did not objectively measure temp at home. But did endorse body aches and general lethargy a/w poor po intake. She noticed some palpitations today which prompted her to come to [facility] ED. She lives w/ her daughter and denies any sick contacts and was compliant w/ social isolation and mostly stayed at home. Denies any N/V/D, orthopnea, PND, dysuria, frequency, urgency, or confusion.     In the ED, vitals:  irregular and Tmax 100.7. Was given x1 azithro and rocephin x1. lasix 40 x1 and Cardizem 10 IV push which rate controlled her at 110 bpm.

## 2020-04-25 NOTE — ED PROVIDER NOTE - CLINICAL SUMMARY MEDICAL DECISION MAKING FREE TEXT BOX
Patient presented with dyspnea, decreased PO intake. (+) fluid overloaded on exam and hypoxic on RA, as well as tachycardic to 170s (afib with RVR on EKG), which improved with supplemental O2 and IVF/tylenol. Given cardizem for 120s afib with improvement. Obtained labs which were remarkable for elevated troponin, likely from demand, as well as pro-bnp elevation. CXR showed (+) bilateral congestion but underlying COVID infection cannot be ruled out. Patient stabilized in ED after tx, but given the above findings plus new O2 requirement, will require admission. Patient agreeable with plan. HD stable at time of admission.

## 2020-04-25 NOTE — ED PROVIDER NOTE - ATTENDING CONTRIBUTION TO CARE
79 year old female, pmhx as documented above, presenting with dyspnea x 1-2 days that has been worsening, associated with decreased PO intake. Patient is limited historian but endorses cough with subjective fevers. Otherwise denies pain or any other symptoms or complaints.    Vital Signs: I have reviewed the initial vital signs.  Constitutional: Well-nourished, appears stated age, mild respiratory distress  HEENT: Airway patent, moist MM, no erythema/swelling/deformity of oral structures. EOMI, PERRLA.  CV: (+) irregular tachycardia, well-perfused extremities, 2+ b/l DP and radial pulses equal.  Lungs: (+) bilateral rhonchi and rales, increased WOB on RA  ABD: NTND, no guarding or rebound, no pulsatile mass, no hernias.   MSK: Neck supple, nontender, nl ROM, no stepoff. Chest nontender. Back nontender in TLS spine or to b/l bony structures or flanks. Ext nontender, nl rom, no deformity.   INTEG: Skin warm, dry, no rash.  NEURO: A&Ox3, normal strength, nl sensation throughout, normal speech.   PSYCH: Calm, cooperative, normal affect and interaction.    Hypoxic on RA but this improved with O2. WIll obtain labs, EKG, CXR, re-eval. Patient tachycardic in triage, which significantly improved during eval.

## 2020-04-25 NOTE — ED ADULT TRIAGE NOTE - CHIEF COMPLAINT QUOTE
Pt c/o worsened sob, family called 911 for increased work of breathing. Pt c/o worsened sob, family called 911 for increased work of breathing. Pt was hypotensive on the scene, BP improved after the bolus.

## 2020-04-25 NOTE — H&P ADULT - NSICDXPASTMEDICALHX_GEN_ALL_CORE_FT
PAST MEDICAL HISTORY:  Atrial fibrillation     CAD S/P percutaneous coronary angioplasty     Diverticulitis     History of stomach cancer 25 years ago-- had stomach surgery    HTN (hypertension)     Hyperlipidemia

## 2020-04-25 NOTE — ED ADULT NURSE NOTE - OBJECTIVE STATEMENT
Pt. brought to ED with c/o SOB, hypotension, and fever. Denies n/v/d, cp, cough, or any sick contacts.  States she lives at home with her daughter and hasn't left the house.

## 2020-04-26 LAB
ALBUMIN SERPL ELPH-MCNC: 3.1 G/DL — LOW (ref 3.5–5.2)
ALP SERPL-CCNC: 157 U/L — HIGH (ref 30–115)
ALT FLD-CCNC: 21 U/L — SIGNIFICANT CHANGE UP (ref 0–41)
ANION GAP SERPL CALC-SCNC: 15 MMOL/L — HIGH (ref 7–14)
AST SERPL-CCNC: 35 U/L — SIGNIFICANT CHANGE UP (ref 0–41)
BASOPHILS # BLD AUTO: 0.05 K/UL — SIGNIFICANT CHANGE UP (ref 0–0.2)
BASOPHILS NFR BLD AUTO: 0.4 % — SIGNIFICANT CHANGE UP (ref 0–1)
BILIRUB SERPL-MCNC: 0.7 MG/DL — SIGNIFICANT CHANGE UP (ref 0.2–1.2)
BUN SERPL-MCNC: 23 MG/DL — HIGH (ref 10–20)
C DIFF BY PCR RESULT: POSITIVE
C DIFF TOX GENS STL QL NAA+PROBE: SIGNIFICANT CHANGE UP
CALCIUM SERPL-MCNC: 8.7 MG/DL — SIGNIFICANT CHANGE UP (ref 8.5–10.1)
CHLORIDE SERPL-SCNC: 98 MMOL/L — SIGNIFICANT CHANGE UP (ref 98–110)
CO2 SERPL-SCNC: 23 MMOL/L — SIGNIFICANT CHANGE UP (ref 17–32)
CREAT SERPL-MCNC: 1.6 MG/DL — HIGH (ref 0.7–1.5)
CRP SERPL-MCNC: 10.36 MG/DL — HIGH (ref 0–0.4)
EOSINOPHIL # BLD AUTO: 0.13 K/UL — SIGNIFICANT CHANGE UP (ref 0–0.7)
EOSINOPHIL NFR BLD AUTO: 1.1 % — SIGNIFICANT CHANGE UP (ref 0–8)
FERRITIN SERPL-MCNC: 117 NG/ML — SIGNIFICANT CHANGE UP (ref 15–150)
GLUCOSE SERPL-MCNC: 109 MG/DL — HIGH (ref 70–99)
HCT VFR BLD CALC: 43.9 % — SIGNIFICANT CHANGE UP (ref 37–47)
HGB BLD-MCNC: 13.3 G/DL — SIGNIFICANT CHANGE UP (ref 12–16)
IMM GRANULOCYTES NFR BLD AUTO: 0.9 % — HIGH (ref 0.1–0.3)
LYMPHOCYTES # BLD AUTO: 0.7 K/UL — LOW (ref 1.2–3.4)
LYMPHOCYTES # BLD AUTO: 6 % — LOW (ref 20.5–51.1)
MCHC RBC-ENTMCNC: 25.6 PG — LOW (ref 27–31)
MCHC RBC-ENTMCNC: 30.3 G/DL — LOW (ref 32–37)
MCV RBC AUTO: 84.6 FL — SIGNIFICANT CHANGE UP (ref 81–99)
MONOCYTES # BLD AUTO: 1.11 K/UL — HIGH (ref 0.1–0.6)
MONOCYTES NFR BLD AUTO: 9.5 % — HIGH (ref 1.7–9.3)
NEUTROPHILS # BLD AUTO: 9.64 K/UL — HIGH (ref 1.4–6.5)
NEUTROPHILS NFR BLD AUTO: 82.1 % — HIGH (ref 42.2–75.2)
NRBC # BLD: 0 /100 WBCS — SIGNIFICANT CHANGE UP (ref 0–0)
PLATELET # BLD AUTO: 145 K/UL — SIGNIFICANT CHANGE UP (ref 130–400)
POTASSIUM SERPL-MCNC: 4.5 MMOL/L — SIGNIFICANT CHANGE UP (ref 3.5–5)
POTASSIUM SERPL-SCNC: 4.5 MMOL/L — SIGNIFICANT CHANGE UP (ref 3.5–5)
PROCALCITONIN SERPL-MCNC: 2.41 NG/ML — HIGH (ref 0.02–0.1)
PROT SERPL-MCNC: 5.6 G/DL — LOW (ref 6–8)
RBC # BLD: 5.19 M/UL — SIGNIFICANT CHANGE UP (ref 4.2–5.4)
RBC # FLD: 18.5 % — HIGH (ref 11.5–14.5)
SODIUM SERPL-SCNC: 136 MMOL/L — SIGNIFICANT CHANGE UP (ref 135–146)
TROPONIN T SERPL-MCNC: 0.03 NG/ML — CRITICAL HIGH
TSH SERPL-MCNC: 4.16 UIU/ML — SIGNIFICANT CHANGE UP (ref 0.27–4.2)
WBC # BLD: 11.73 K/UL — HIGH (ref 4.8–10.8)
WBC # FLD AUTO: 11.73 K/UL — HIGH (ref 4.8–10.8)

## 2020-04-26 PROCEDURE — 99233 SBSQ HOSP IP/OBS HIGH 50: CPT

## 2020-04-26 PROCEDURE — 93306 TTE W/DOPPLER COMPLETE: CPT | Mod: 26

## 2020-04-26 RX ORDER — FUROSEMIDE 40 MG
20 TABLET ORAL DAILY
Refills: 0 | Status: DISCONTINUED | OUTPATIENT
Start: 2020-04-26 | End: 2020-04-27

## 2020-04-26 RX ORDER — CEFEPIME 1 G/1
2000 INJECTION, POWDER, FOR SOLUTION INTRAMUSCULAR; INTRAVENOUS EVERY 12 HOURS
Refills: 0 | Status: DISCONTINUED | OUTPATIENT
Start: 2020-04-26 | End: 2020-04-26

## 2020-04-26 RX ADMIN — Medication 81 MILLIGRAM(S): at 11:56

## 2020-04-26 RX ADMIN — Medication 50 MILLIGRAM(S): at 05:15

## 2020-04-26 RX ADMIN — RIVAROXABAN 15 MILLIGRAM(S): KIT at 16:49

## 2020-04-26 RX ADMIN — NYSTATIN CREAM 1 APPLICATION(S): 100000 CREAM TOPICAL at 22:20

## 2020-04-26 RX ADMIN — Medication 40 MILLIGRAM(S): at 05:15

## 2020-04-26 RX ADMIN — NYSTATIN CREAM 1 APPLICATION(S): 100000 CREAM TOPICAL at 05:15

## 2020-04-26 RX ADMIN — NYSTATIN CREAM 1 APPLICATION(S): 100000 CREAM TOPICAL at 14:01

## 2020-04-26 RX ADMIN — Medication 50 MILLIGRAM(S): at 16:48

## 2020-04-26 RX ADMIN — PANTOPRAZOLE SODIUM 40 MILLIGRAM(S): 20 TABLET, DELAYED RELEASE ORAL at 05:16

## 2020-04-26 RX ADMIN — PANTOPRAZOLE SODIUM 40 MILLIGRAM(S): 20 TABLET, DELAYED RELEASE ORAL at 16:48

## 2020-04-26 NOTE — CONSULT NOTE ADULT - ATTENDING COMMENTS
Acute on likely Chronic Right Heart Failure  Rate controlled AF on Xarelto  CAD s/p remote PCI  C. diff colitis with diarrhea  h/o smoking (quit 2 months ago)    LE edema improving    CXR:  Right pleural effusion / opacity    Recommend:  - Increase Lasix to 40 mg IV daily  - Increase Metoprolol to 50 mg q8  - Continue Xarelto  - Consider LE duplex to r/o DVT and CTA to r/o PE in the setting of right heart enlargement with echocardiographic evidence of right-sided pressure and volume overload  - Telemetry for 24 hr

## 2020-04-26 NOTE — CONSULT NOTE ADULT - ASSESSMENT
Assessment:  Clinically right sided heart failure with moderate pulm HTN  Afib with RVR, rate improved  h/o CAD s/p PCI+stent  VANI vs. 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)  C diff colitis    Plan:  c/w aspirin  c/w lopressor 50 mg po BID for better rate control (prescribed for toprol xl outpatient)  please confirm patient home meds (pt was prescribed for quinapril 10 mg and lipitor 40 mg at one point), pt cannot remember if she is taking at home  may consider increasing lasix 20 mg IV BID, monitor creatinine and electrolytes closely, given C diff colitis and diarrhea, will avoid intravascular volume depletion  obtain venous duplex, unclear how compliant patient with xarelto Assessment:  Clinically Right sided heart failure with moderate pulm HTN  Afib with RVR, rate improved  h/o CAD s/p PCI  VANI on ?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)  C diff colitis    Plan:  c/w aspirin  c/w lopressor 50 mg po BID for better rate control (prescribed for toprol xl outpatient)  please confirm patient home meds (pt was prescribed for quinapril 10 mg and lipitor 40 mg at one point), pt cannot remember if she is taking at home  may consider increasing lasix 20 mg IV BID, monitor creatinine and electrolytes closely, given C diff colitis and diarrhea, will avoid intravascular volume depletion  obtain venous duplex, unclear how compliant patient with xarelto Assessment:  Clinically Right sided heart failure with moderate pulm HTN  Afib with RVR, rate improved  h/o CAD s/p PCI  VANI on ?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

## 2020-04-26 NOTE — CONSULT NOTE ADULT - CONSULT REASON
dyspnea/ possible CHF exacerbation with AFIB with RvR dyspnea/ possible CHF exacerbation with AFIB with RVR

## 2020-04-26 NOTE — CONSULT NOTE ADULT - SUBJECTIVE AND OBJECTIVE BOX
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 pt did not objectively measure temp at home. But did endorse body aches and general lethargy a/w poor po intake. She noticed some palpitations today which prompted her to come to [facility] ED. She lives w/ her daughter and denies any sick contacts and was compliant w/ social isolation and mostly stayed at home. Denies any N/V/D, orthopnea, PND, dysuria, frequency, urgency, or confusion.     In the ED, vitals:  irregular and Tmax 100.7. Was given x1 azithro and rocephin x1. lasix 40 x1 and Cardizem 10 IV push which rate controlled her at 110 bpm. (25 Apr 2020 10:39)    Cardio note: Pt is poor historian. Pt does not remember much of her cardiac history. However, patient denies any chest pain, palpitations, SOB, cough, fever, abdominal pain. Pt states that her daughter sent her in but does not know exactly why. Pt admits to diarrhea and bilateral LE swelling which makes patient difficult to walk.    PAST MEDICAL & SURGICAL HISTORY:  History of stomach cancer: 25 years ago-- had stomach surgery  Hyperlipidemia  CAD S/P percutaneous coronary angioplasty  Atrial fibrillation  HTN (hypertension)  Diverticulitis  S/P small bowel resection  H/O abdominal hysterectomy    FAMILY HISTORY:  [x] no pertinent family history of premature cardiovascular disease in first degree relatives.    SOCIAL HISTORY:    [x] Ex-Smoker - quit 2 months  [x] No alcohol use  [x] No illicit drug use    Allergies:  penicillin (Anaphylaxis)  penicillin (Unknown)    REVIEW OF SYSTEMS:  CONSTITUTIONAL: No fever, weight loss, or fatigue.  CARDIOLOGY: No chest pain, dyspnea of exertion, or syncopal episodes.   RESPIRATORY: No shortness of breath, cough, wheezing.   NEUROLOGICAL: No weakness, no focal deficits to report.  GI: No BRBPR, no N,V. (+) Diarrhea.  PSYCHIATRY: Normal mood and affect.  HEENT: No nasal discharge, no ecchymosis  SKIN: No ecchymosis, no breakdown  MUSCULOSKELETAL: Full range of motion x4. (+) B/L LE swelling.  EXTREM: No leg swelling or erythema.    PHYSICAL EXAM:  T(C): 36 (04-26-20 @ 14:27), Max: 36.2 (04-26-20 @ 05:35)  HR: 101 (04-26-20 @ 16:42) (94 - 106)  BP: 95/68 (04-26-20 @ 16:42) (95/68 - 115/71)  RR: 19 (04-26-20 @ 14:27) (19 - 21)  SpO2: 99% (04-26-20 @ 08:00) (96% - 99%)  Wt(kg): --  I&O's Summary    25 Apr 2020 07:01 - 26 Apr 2020 07:00  --------------------------------------------------------  IN: 300 mL / OUT: 400 mL / NET: -100 mL    26 Apr 2020 07:01  -  26 Apr 2020 16:52  --------------------------------------------------------  IN: 0 mL / OUT: 300 mL / NET: -300 mL        General Appearance: NAD, normal for age and gender. 	  Neck: Normal JVP, no bruit.   Eyes: No xanthomalasia, Extra Ocular muscles intact.   Cardiovascular: Irregularly irregular, no significant murmur.  Respiratory: Crackles at right bases. Left base clear.  Psychiatry: Alert and oriented x 3, Mood & affect appropriate  Gastrointestinal:  Soft, Non-tender, Non-distended.  Skin/Integumen: No rashes, No ecchymoses, No cyanosis	  Neurologic: Non-focal deficits.  Musculoskeletal/ extremities: Normal range of motion, No clubbing, cyanosis. (+) Significant b/l LE swelling.    LABS:	 	                        13.3   11.73 )-----------( 145      ( 26 Apr 2020 07:00 )             43.9     04-26    136  |  98  |  23<H>  ----------------------------<  109<H>  4.5   |  23  |  1.6<H>    Ca    8.7      26 Apr 2020 07:00  Phos  3.9     04-25  Mg     1.8     04-25    TPro  5.6<L>  /  Alb  3.1<L>  /  TBili  0.7  /  DBili  x   /  AST  35  /  ALT  21  /  AlkPhos  157<H>  04-26    CARDIAC MARKERS ( 26 Apr 2020 11:36 )  x     / 0.03 ng/mL / x     / x     / x      CARDIAC MARKERS ( 25 Apr 2020 16:52 )  x     / x     / 190 U/L / x     / x      CARDIAC MARKERS ( 25 Apr 2020 07:18 )  x     / 0.04 ng/mL / x     / x     / x        PT/INR - ( 25 Apr 2020 16:52 )   PT: 18.90 sec;   INR: 1.64 ratio      TELEMETRY EVENTS: 	    ECG:  < from: 12 Lead ECG (04.25.20 @ 16:17) >  Diagnosis Line Atrial fibrillation  Nonspecific T wave abnormality  ProlongedQT  Abnormal ECG    < from: 12 Lead ECG (04.25.20 @ 07:08) >  Diagnosis Line Atrial fibrillation with rapid ventricular response with premature ventricular  or aberrantly conducted complexes  Nonspecific ST and T wave abnormality  Abnormal ECG  	  RADIOLOGY: < from: Xray Chest 1 View-PORTABLE IMMEDIATE (04.25.20 @ 08:06) >  New right basilar opacity/effusion. Follow-up after treatment recommended to demonstrate resolution.    OTHER: 	    PREVIOUS DIAGNOSTIC TESTING:    [x] Echocardiogram: < from: Transthoracic Echocardiogram (04.26.20 @ 07:57) >   1. Left ventricular ejection fraction, by visual estimation, is 45 to 50%.   2. Technically suboptimal study.   3. Mildly decreased global left ventricular systolic function.   4. Right ventricular volume and pressure overload.   5. RV is not well visualized, appears markedly dilated.   6. Left atrium is mildly dilated.   7. Moderately enlarged right atrium.   8. Moderate pleural effusion in the right lateral region.   9. Mild thickening of the anterior and posterior mitral valve leaflets.  10. Moderate mitral valve regurgitation.  11. Moderate tricuspid regurgitation.  12. Estimated pulmonary artery systolic pressure is 51.5 mmHg assuming a right atrial pressure of 15 mmHg, which is consistent with moderate pulmonary hypertension.    [ ] Catheterization:   [ ] Stress Test:  	  	  Home Medications:  aspirin 81 mg oral tablet, chewable: 1 tab(s) orally once a day (25 Apr 2020 10:45)  metoprolol succinate 50 mg oral tablet, extended release: 1 tab(s) orally once a day (25 Apr 2020 10:45)  Xarelto: 20 milligram(s) orally once a day (25 Apr 2020 10:45)    MEDICATIONS  (STANDING):  aspirin  chewable 81 milliGRAM(s) Oral daily  furosemide   Injectable 20 milliGRAM(s) IV Push daily  metoprolol tartrate 50 milliGRAM(s) Oral two times a day  nystatin Powder 1 Application(s) Topical three times a day  pantoprazole    Tablet 40 milliGRAM(s) Oral two times a day  rivaroxaban 15 milliGRAM(s) Oral with dinner    MEDICATIONS  (PRN):  acetaminophen   Tablet .. 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 pt did not objectively measure temp at home. But did endorse body aches and general lethargy a/w poor po intake. She noticed some palpitations today which prompted her to come to [facility] ED. She lives w/ her daughter and denies any sick contacts and was compliant w/ social isolation and mostly stayed at home. Denies any N/V/D, orthopnea, PND, dysuria, frequency, urgency, or confusion.     In the ED, vitals:  irregular and Tmax 100.7. Was given x1 azithro and rocephin x1. lasix 40 x1 and Cardizem 10 IV push which rate controlled her at 110 bpm. (25 Apr 2020 10:39)    Cardio note: Pt is poor historian. Pt does not remember much of her cardiac history. However, patient denies any chest pain, palpitations, SOB, cough, fever, abdominal pain. Pt states that her daughter sent her in but does not know exactly why. Pt admits to diarrhea and bilateral LE swelling which makes patient difficult to walk.      PAST MEDICAL & SURGICAL HISTORY:  History of stomach cancer: 25 years ago-- had stomach surgery  Hyperlipidemia  CAD S/P percutaneous coronary angioplasty  Atrial fibrillation  HTN (hypertension)  Diverticulitis  S/P small bowel resection  H/O abdominal hysterectomy    FAMILY HISTORY:  [x] no pertinent family history of premature cardiovascular disease in first degree relatives.    SOCIAL HISTORY:    [x] Ex-Smoker - quit 2 months  [x] No alcohol use  [x] No illicit drug use    Allergies:  penicillin (Anaphylaxis)  penicillin (Unknown)    REVIEW OF SYSTEMS:  CONSTITUTIONAL: No fever, weight loss, or fatigue.  CARDIOLOGY: No chest pain, dyspnea of exertion, or syncopal episodes.   RESPIRATORY: No shortness of breath, cough, wheezing.   NEUROLOGICAL: No weakness, no focal deficits to report.  GI: No BRBPR, no N,V. (+) Diarrhea.  PSYCHIATRY: Normal mood and affect.  HEENT: No nasal discharge, no ecchymosis  SKIN: No ecchymosis, no breakdown  MUSCULOSKELETAL: Full range of motion x4. (+) B/L LE swelling.  EXTREM: No leg swelling or erythema.    PHYSICAL EXAM:  T(C): 36 (04-26-20 @ 14:27), Max: 36.2 (04-26-20 @ 05:35)  HR: 101 (04-26-20 @ 16:42) (94 - 106)  BP: 95/68 (04-26-20 @ 16:42) (95/68 - 115/71)  RR: 19 (04-26-20 @ 14:27) (19 - 21)  SpO2: 99% (04-26-20 @ 08:00) (96% - 99%)  Wt(kg): --  I&O's Summary    25 Apr 2020 07:01 - 26 Apr 2020 07:00  --------------------------------------------------------  IN: 300 mL / OUT: 400 mL / NET: -100 mL    26 Apr 2020 07:01  -  26 Apr 2020 16:52  --------------------------------------------------------  IN: 0 mL / OUT: 300 mL / NET: -300 mL        General Appearance: NAD, normal for age and gender. 	  Neck: Normal JVP, no bruit.   Eyes: No xanthomalasia, Extra Ocular muscles intact.   Cardiovascular: Irregularly irregular, no significant murmur.  Respiratory: Crackles at right bases. Left base clear.  Psychiatry: Alert and oriented x 3, Mood & affect appropriate  Gastrointestinal:  Soft, Non-tender, Non-distended.  Skin/Integumen: No rashes, No ecchymoses, No cyanosis	  Neurologic: Non-focal deficits.  Musculoskeletal/ extremities: Normal range of motion, No clubbing, cyanosis. (+) Significant b/l LE swelling.      LABS:	 	                        13.3   11.73 )-----------( 145      ( 26 Apr 2020 07:00 )             43.9     04-26    136  |  98  |  23<H>  ----------------------------<  109<H>  4.5   |  23  |  1.6<H>    Ca    8.7      26 Apr 2020 07:00  Phos  3.9     04-25  Mg     1.8     04-25    TPro  5.6<L>  /  Alb  3.1<L>  /  TBili  0.7  /  DBili  x   /  AST  35  /  ALT  21  /  AlkPhos  157<H>  04-26      CARDIAC MARKERS ( 26 Apr 2020 11:36 )  x     / 0.03 ng/mL / x     / x     / x      CARDIAC MARKERS ( 25 Apr 2020 16:52 )  x     / x     / 190 U/L / x     / x      CARDIAC MARKERS ( 25 Apr 2020 07:18 )  x     / 0.04 ng/mL / x     / x     / x        PT/INR - ( 25 Apr 2020 16:52 )   PT: 18.90 sec;   INR: 1.64 ratio            ECG:  < from: 12 Lead ECG (04.25.20 @ 16:17) >  Diagnosis Line Atrial fibrillation  Nonspecific T wave abnormality  ProlongedQT  Abnormal ECG        < from: 12 Lead ECG (04.25.20 @ 07:08) >  Diagnosis Line Atrial fibrillation with rapid ventricular response with premature ventricular  or aberrantly conducted complexes  Nonspecific ST and T wave abnormality  Abnormal ECG  	    RADIOLOGY: < from: Xray Chest 1 View-PORTABLE IMMEDIATE (04.25.20 @ 08:06) >  New right basilar opacity/effusion. Follow-up after treatment recommended to demonstrate resolution.    OTHER: 	    PREVIOUS DIAGNOSTIC TESTING:    [x] Echocardiogram: < from: Transthoracic Echocardiogram (04.26.20 @ 07:57) >   1. Left ventricular ejection fraction, by visual estimation, is 45 to 50%.   2. Technically suboptimal study.   3. Mildly decreased global left ventricular systolic function.   4. Right ventricular volume and pressure overload.   5. RV is not well visualized, appears markedly dilated.   6. Left atrium is mildly dilated.   7. Moderately enlarged right atrium.   8. Moderate pleural effusion in the right lateral region.   9. Mild thickening of the anterior and posterior mitral valve leaflets.  10. Moderate mitral valve regurgitation.  11. Moderate tricuspid regurgitation.  12. Estimated pulmonary artery systolic pressure is 51.5 mmHg assuming a right atrial pressure of 15 mmHg, which is consistent with moderate pulmonary hypertension.      	  Home Medications:  aspirin 81 mg oral tablet, chewable: 1 tab(s) orally once a day (25 Apr 2020 10:45)  metoprolol succinate 50 mg oral tablet, extended release: 1 tab(s) orally once a day (25 Apr 2020 10:45)  Xarelto: 20 milligram(s) orally once a day (25 Apr 2020 10:45)    MEDICATIONS  (STANDING):  aspirin  chewable 81 milliGRAM(s) Oral daily  furosemide   Injectable 20 milliGRAM(s) IV Push daily  metoprolol tartrate 50 milliGRAM(s) Oral two times a day  nystatin Powder 1 Application(s) Topical three times a day  pantoprazole    Tablet 40 milliGRAM(s) Oral two times a day  rivaroxaban 15 milliGRAM(s) Oral with dinner    MEDICATIONS  (PRN):  acetaminophen   Tablet .. 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 pt did not objectively measure temp at home. But did endorse body aches and general lethargy a/w poor po intake. She noticed some palpitations today which prompted her to come to [facility] ED. She lives w/ her daughter and denies any sick contacts and was compliant w/ social isolation and mostly stayed at home. Denies any N/V/D, orthopnea, PND, dysuria, frequency, urgency, or confusion.     In the ED, vitals:  irregular and Tmax 100.7. Was given x1 azithro and rocephin x1. lasix 40 x1 and Cardizem 10 IV push which rate controlled her at 110 bpm. (25 Apr 2020 10:39)    Cardio note: Pt is poor historian. Pt does not remember much of her cardiac history. However, patient denies any chest pain, palpitations, SOB, cough, fever, abdominal pain. Pt states that her daughter sent her in but does not know exactly why. Pt admits to diarrhea and bilateral LE swelling which makes patient difficult to walk.      PAST MEDICAL & SURGICAL HISTORY:  History of stomach cancer: 25 years ago-- had stomach surgery  Hyperlipidemia  CAD S/P percutaneous coronary angioplasty  Atrial fibrillation  HTN (hypertension)  Diverticulitis  S/P small bowel resection  H/O abdominal hysterectomy    FAMILY HISTORY:  [x] no pertinent family history of premature cardiovascular disease in first degree relatives.    SOCIAL HISTORY:    [x] Ex-Smoker - quit 2 months  [x] No alcohol use  [x] No illicit drug use    Allergies:  penicillin (Anaphylaxis)  penicillin (Unknown)    REVIEW OF SYSTEMS:  CONSTITUTIONAL: No fever, weight loss, or fatigue.  CARDIOLOGY: No chest pain, dyspnea of exertion, or syncopal episodes.   RESPIRATORY: No shortness of breath, cough, wheezing.   NEUROLOGICAL: No weakness, no focal deficits to report.  GI: No BRBPR, no N,V. (+) Diarrhea.  PSYCHIATRY: Normal mood and affect.  HEENT: No nasal discharge, no ecchymosis  SKIN: No ecchymosis, no breakdown  MUSCULOSKELETAL: Full range of motion x4. (+) B/L LE swelling.  EXTREM: No leg swelling or erythema.    PHYSICAL EXAM:  T(C): 36 (04-26-20 @ 14:27), Max: 36.2 (04-26-20 @ 05:35)  HR: 101 (04-26-20 @ 16:42) (94 - 106)  BP: 95/68 (04-26-20 @ 16:42) (95/68 - 115/71)  RR: 19 (04-26-20 @ 14:27) (19 - 21)  SpO2: 99% (04-26-20 @ 08:00) (96% - 99%)  Wt(kg): --  I&O's Summary    25 Apr 2020 07:01 - 26 Apr 2020 07:00  --------------------------------------------------------  IN: 300 mL / OUT: 400 mL / NET: -100 mL    26 Apr 2020 07:01  -  26 Apr 2020 16:52  --------------------------------------------------------  IN: 0 mL / OUT: 300 mL / NET: -300 mL        General Appearance: NAD, normal for age and gender. 	  Neck: Normal JVP, no bruit.   Eyes: No xanthomalasia, Extra Ocular muscles intact.   Cardiovascular: Irregularly irregular, no significant murmur.  Respiratory: Crackles at right bases. Left base clear.  Psychiatry: Alert and oriented x 3, Mood & affect appropriate  Gastrointestinal:  Soft, Non-tender, Non-distended.  Skin/Integumen: No rashes, No ecchymoses, No cyanosis	  Neurologic: Non-focal deficits.  Musculoskeletal/ extremities: Normal range of motion, No clubbing, cyanosis. (+) Significant b/l LE swelling.      LABS:	 	                        13.3   11.73 )-----------( 145      ( 26 Apr 2020 07:00 )             43.9     04-26    136  |  98  |  23<H>  ----------------------------<  109<H>  4.5   |  23  |  1.6<H>    Ca    8.7      26 Apr 2020 07:00  Phos  3.9     04-25  Mg     1.8     04-25    TPro  5.6<L>  /  Alb  3.1<L>  /  TBili  0.7  /  DBili  x   /  AST  35  /  ALT  21  /  AlkPhos  157<H>  04-26      CARDIAC MARKERS ( 26 Apr 2020 11:36 )  x     / 0.03 ng/mL / x     / x     / x      CARDIAC MARKERS ( 25 Apr 2020 16:52 )  x     / x     / 190 U/L / x     / x      CARDIAC MARKERS ( 25 Apr 2020 07:18 )  x     / 0.04 ng/mL / x     / x     / x        PT/INR - ( 25 Apr 2020 16:52 )   PT: 18.90 sec;   INR: 1.64 ratio            ECG:  < from: 12 Lead ECG (04.25.20 @ 16:17) >  Diagnosis Line Atrial fibrillation  Nonspecific T wave abnormality  ProlongedQT  Abnormal ECG        < from: 12 Lead ECG (04.25.20 @ 07:08) >  Diagnosis Line Atrial fibrillation with rapid ventricular response with premature ventricular  or aberrantly conducted complexes  Nonspecific ST and T wave abnormality  Abnormal ECG  	    RADIOLOGY: < from: Xray Chest 1 View-PORTABLE IMMEDIATE (04.25.20 @ 08:06) >  New right basilar opacity/effusion. Follow-up after treatment recommended to demonstrate resolution.    OTHER: 	    PREVIOUS DIAGNOSTIC TESTING:    [x] Echocardiogram: < from: Transthoracic Echocardiogram (04.26.20 @ 07:57) >   1. Left ventricular ejection fraction, by visual estimation, is 45 to 50%.   2. Technically suboptimal study.   3. Mildly decreased global left ventricular systolic function.   4. Right ventricular volume and pressure overload.   5. RV is not well visualized, appears markedly dilated.   6. Left atrium is mildly dilated.   7. Moderately enlarged right atrium.   8. Moderate pleural effusion in the right lateral region.   9. Mild thickening of the anterior and posterior mitral valve leaflets.  10. Moderate mitral valve regurgitation.  11. Moderate tricuspid regurgitation.  12. Estimated pulmonary artery systolic pressure is 51.5 mmHg assuming a right atrial pressure of 15 mmHg, which is consistent with moderate pulmonary hypertension.      	  Home Medications:  aspirin 81 mg oral tablet, chewable: 1 tab(s) orally once a day (25 Apr 2020 10:45)  metoprolol succinate 50 mg oral tablet, extended release: 1 tab(s) orally once a day (25 Apr 2020 10:45)  Xarelto: 20 milligram(s) orally once a day (25 Apr 2020 10:45)    MEDICATIONS  (STANDING):  aspirin  chewable 81 milliGRAM(s) Oral daily  furosemide   Injectable 20 milliGRAM(s) IV Push daily  metoprolol tartrate 50 milliGRAM(s) Oral two times a day  nystatin Powder 1 Application(s) Topical three times a day  pantoprazole    Tablet 40 milliGRAM(s) Oral two times a day  rivaroxaban 15 milliGRAM(s) Oral with dinner    MEDICATIONS  (PRN):  acetaminophen   Tablet .. 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 pt did not objectively measure temp at home. But did endorse body aches and general lethargy a/w poor po intake. She noticed some palpitations today which prompted her to come to [facility] ED. She lives w/ her daughter and denies any sick contacts and was compliant w/ social isolation and mostly stayed at home. Denies any N/V/D, orthopnea, PND, dysuria, frequency, urgency, or confusion.     In the ED, vitals:  irregular and Tmax 100.7. Was given x1 azithro and rocephin x1. lasix 40 x1 and Cardizem 10 IV push which rate controlled her at 110 bpm. (25 Apr 2020 10:39)    Cardio note: Pt is poor historian. Pt does not remember much of her cardiac history. However, patient denies any chest pain, palpitations, SOB, cough, fever, abdominal pain. Pt states that her daughter sent her in but does not know exactly why. Pt admits to diarrhea and bilateral LE swelling which makes patient difficult to walk.      PAST MEDICAL & SURGICAL HISTORY:  History of stomach cancer: 25 years ago-- had stomach surgery  Hyperlipidemia  CAD S/P percutaneous coronary angioplasty  Atrial fibrillation  HTN (hypertension)  Diverticulitis  S/P small bowel resection  H/O abdominal hysterectomy    FAMILY HISTORY:  [x] no pertinent family history of premature cardiovascular disease in first degree relatives.    SOCIAL HISTORY:    [x] Ex-Smoker - quit 2 months  [x] No alcohol use  [x] No illicit drug use    Allergies:  penicillin (Anaphylaxis)  penicillin (Unknown)    REVIEW OF SYSTEMS:  CONSTITUTIONAL: No fever, weight loss, or fatigue.  CARDIOLOGY: No chest pain, dyspnea of exertion, or syncopal episodes.   RESPIRATORY: No shortness of breath, cough, wheezing.   NEUROLOGICAL: No weakness, no focal deficits to report.  GI: No BRBPR, no N,V. (+) Diarrhea.  PSYCHIATRY: Normal mood and affect.  HEENT: No nasal discharge, no ecchymosis  SKIN: No ecchymosis, no breakdown  MUSCULOSKELETAL: Full range of motion x4. (+) B/L LE swelling.  EXTREM: No leg swelling or erythema.    PHYSICAL EXAM:  T(C): 36 (04-26-20 @ 14:27), Max: 36.2 (04-26-20 @ 05:35)  HR: 101 (04-26-20 @ 16:42) (94 - 106)  BP: 95/68 (04-26-20 @ 16:42) (95/68 - 115/71)  RR: 19 (04-26-20 @ 14:27) (19 - 21)  SpO2: 99% (04-26-20 @ 08:00) (96% - 99%)  Wt(kg): --  I&O's Summary    25 Apr 2020 07:01 - 26 Apr 2020 07:00  --------------------------------------------------------  IN: 300 mL / OUT: 400 mL / NET: -100 mL    26 Apr 2020 07:01  -  26 Apr 2020 16:52  --------------------------------------------------------  IN: 0 mL / OUT: 300 mL / NET: -300 mL        General Appearance: NAD, normal for age and gender. 	  Neck: Normal JVP, no bruit.   Eyes: No xanthomalasia, Extra Ocular muscles intact.   Cardiovascular: Irregularly irregular, no murmurs, 2-3+ b/l LE edema  Respiratory: Crackles at right bases. Left base clear.  Psychiatry: Alert and oriented x 3, Mood & affect appropriate  Gastrointestinal:  Soft, Non-tender, Non-distended.  Skin/Integumen: No rashes, No ecchymoses, No cyanosis	  Neurologic: Non-focal deficits.  Musculoskeletal/ extremities: Normal range of motion, No clubbing, cyanosis. (+) Significant b/l LE swelling.      LABS:	 	                        13.3   11.73 )-----------( 145      ( 26 Apr 2020 07:00 )             43.9     04-26    136  |  98  |  23<H>  ----------------------------<  109<H>  4.5   |  23  |  1.6<H>    Ca    8.7      26 Apr 2020 07:00  Phos  3.9     04-25  Mg     1.8     04-25    TPro  5.6<L>  /  Alb  3.1<L>  /  TBili  0.7  /  DBili  x   /  AST  35  /  ALT  21  /  AlkPhos  157<H>  04-26      CARDIAC MARKERS ( 26 Apr 2020 11:36 )  x     / 0.03 ng/mL / x     / x     / x      CARDIAC MARKERS ( 25 Apr 2020 16:52 )  x     / x     / 190 U/L / x     / x      CARDIAC MARKERS ( 25 Apr 2020 07:18 )  x     / 0.04 ng/mL / x     / x     / x        PT/INR - ( 25 Apr 2020 16:52 )   PT: 18.90 sec;   INR: 1.64 ratio            ECG:  < from: 12 Lead ECG (04.25.20 @ 16:17) >  Diagnosis Line Atrial fibrillation  Nonspecific T wave abnormality  Prolonged QT  Abnormal ECG        < from: 12 Lead ECG (04.25.20 @ 07:08) >  Diagnosis Line Atrial fibrillation with rapid ventricular response with premature ventricular  or aberrantly conducted complexes  Nonspecific ST and T wave abnormality  Abnormal ECG  	    < from: Xray Chest 1 View-PORTABLE IMMEDIATE (04.25.20 @ 08:06) >  New right basilar opacity/effusion. Follow-up after treatment recommended to demonstrate resolution.         < from: Transthoracic Echocardiogram (04.26.20 @ 07:57) >   1. Left ventricular ejection fraction, by visual estimation, is 45 to 50%.   2. Technically suboptimal study.   3. Mildly decreased global left ventricular systolic function.   4. Right ventricular volume and pressure overload.   5. RV is not well visualized, appears markedly dilated.   6. Left atrium is mildly dilated.   7. Moderately enlarged right atrium.   8. Moderate pleural effusion in the right lateral region.   9. Mild thickening of the anterior and posterior mitral valve leaflets.  10. Moderate mitral valve regurgitation.  11. Moderate tricuspid regurgitation.  12. Estimated pulmonary artery systolic pressure is 51.5 mmHg assuming a right atrial pressure of 15 mmHg, which is consistent with moderate pulmonary hypertension.      	  Home Medications:  aspirin 81 mg oral tablet, chewable: 1 tab(s) orally once a day (25 Apr 2020 10:45)  metoprolol succinate 50 mg oral tablet, extended release: 1 tab(s) orally once a day (25 Apr 2020 10:45)  Xarelto: 20 milligram(s) orally once a day (25 Apr 2020 10:45)    MEDICATIONS  (STANDING):  aspirin  chewable 81 milliGRAM(s) Oral daily  furosemide   Injectable 20 milliGRAM(s) IV Push daily  metoprolol tartrate 50 milliGRAM(s) Oral two times a day  nystatin Powder 1 Application(s) Topical three times a day  pantoprazole    Tablet 40 milliGRAM(s) Oral two times a day  rivaroxaban 15 milliGRAM(s) Oral with dinner    MEDICATIONS  (PRN):  acetaminophen   Tablet .. 650 milliGRAM(s) Oral every 4 hours PRN Temp greater or equal to 38.5C (101.3F)  ALBUTerol    90 MICROgram(s) HFA Inhaler 2 Puff(s) Inhalation every 4 hours PRN Shortness of Breath and/or Wheezing  ondansetron Injectable 4 milliGRAM(s) IV Push every 6 hours PRN Nausea and/or Vomiting

## 2020-04-27 LAB
ALBUMIN SERPL ELPH-MCNC: 3.5 G/DL — SIGNIFICANT CHANGE UP (ref 3.5–5.2)
ALP SERPL-CCNC: 164 U/L — HIGH (ref 30–115)
ALT FLD-CCNC: 24 U/L — SIGNIFICANT CHANGE UP (ref 0–41)
ANION GAP SERPL CALC-SCNC: 16 MMOL/L — HIGH (ref 7–14)
AST SERPL-CCNC: 32 U/L — SIGNIFICANT CHANGE UP (ref 0–41)
BASOPHILS # BLD AUTO: 0.03 K/UL — SIGNIFICANT CHANGE UP (ref 0–0.2)
BASOPHILS NFR BLD AUTO: 0.3 % — SIGNIFICANT CHANGE UP (ref 0–1)
BILIRUB SERPL-MCNC: 0.8 MG/DL — SIGNIFICANT CHANGE UP (ref 0.2–1.2)
BUN SERPL-MCNC: 25 MG/DL — HIGH (ref 10–20)
CALCIUM SERPL-MCNC: 8.7 MG/DL — SIGNIFICANT CHANGE UP (ref 8.5–10.1)
CHLORIDE SERPL-SCNC: 93 MMOL/L — LOW (ref 98–110)
CO2 SERPL-SCNC: 22 MMOL/L — SIGNIFICANT CHANGE UP (ref 17–32)
CREAT SERPL-MCNC: 1.5 MG/DL — SIGNIFICANT CHANGE UP (ref 0.7–1.5)
EOSINOPHIL # BLD AUTO: 0.16 K/UL — SIGNIFICANT CHANGE UP (ref 0–0.7)
EOSINOPHIL NFR BLD AUTO: 1.6 % — SIGNIFICANT CHANGE UP (ref 0–8)
GLUCOSE SERPL-MCNC: 111 MG/DL — HIGH (ref 70–99)
HCT VFR BLD CALC: 44.6 % — SIGNIFICANT CHANGE UP (ref 37–47)
HGB BLD-MCNC: 13.7 G/DL — SIGNIFICANT CHANGE UP (ref 12–16)
IMM GRANULOCYTES NFR BLD AUTO: 0.7 % — HIGH (ref 0.1–0.3)
LYMPHOCYTES # BLD AUTO: 1.54 K/UL — SIGNIFICANT CHANGE UP (ref 1.2–3.4)
LYMPHOCYTES # BLD AUTO: 15.5 % — LOW (ref 20.5–51.1)
MAGNESIUM SERPL-MCNC: 2.2 MG/DL — SIGNIFICANT CHANGE UP (ref 1.8–2.4)
MCHC RBC-ENTMCNC: 25.6 PG — LOW (ref 27–31)
MCHC RBC-ENTMCNC: 30.7 G/DL — LOW (ref 32–37)
MCV RBC AUTO: 83.2 FL — SIGNIFICANT CHANGE UP (ref 81–99)
MONOCYTES # BLD AUTO: 1.36 K/UL — HIGH (ref 0.1–0.6)
MONOCYTES NFR BLD AUTO: 13.7 % — HIGH (ref 1.7–9.3)
NEUTROPHILS # BLD AUTO: 6.78 K/UL — HIGH (ref 1.4–6.5)
NEUTROPHILS NFR BLD AUTO: 68.2 % — SIGNIFICANT CHANGE UP (ref 42.2–75.2)
NRBC # BLD: 0 /100 WBCS — SIGNIFICANT CHANGE UP (ref 0–0)
PLATELET # BLD AUTO: 211 K/UL — SIGNIFICANT CHANGE UP (ref 130–400)
POTASSIUM SERPL-MCNC: 4.6 MMOL/L — SIGNIFICANT CHANGE UP (ref 3.5–5)
POTASSIUM SERPL-SCNC: 4.6 MMOL/L — SIGNIFICANT CHANGE UP (ref 3.5–5)
PROT SERPL-MCNC: 6 G/DL — SIGNIFICANT CHANGE UP (ref 6–8)
RBC # BLD: 5.36 M/UL — SIGNIFICANT CHANGE UP (ref 4.2–5.4)
RBC # FLD: 18.6 % — HIGH (ref 11.5–14.5)
SODIUM SERPL-SCNC: 131 MMOL/L — LOW (ref 135–146)
WBC # BLD: 9.94 K/UL — SIGNIFICANT CHANGE UP (ref 4.8–10.8)
WBC # FLD AUTO: 9.94 K/UL — SIGNIFICANT CHANGE UP (ref 4.8–10.8)

## 2020-04-27 PROCEDURE — 93970 EXTREMITY STUDY: CPT | Mod: 26

## 2020-04-27 PROCEDURE — 99222 1ST HOSP IP/OBS MODERATE 55: CPT

## 2020-04-27 PROCEDURE — 71045 X-RAY EXAM CHEST 1 VIEW: CPT | Mod: 26

## 2020-04-27 PROCEDURE — 99233 SBSQ HOSP IP/OBS HIGH 50: CPT

## 2020-04-27 RX ORDER — VANCOMYCIN HCL 1 G
125 VIAL (EA) INTRAVENOUS EVERY 6 HOURS
Refills: 0 | Status: DISCONTINUED | OUTPATIENT
Start: 2020-04-27 | End: 2020-05-06

## 2020-04-27 RX ORDER — METOPROLOL TARTRATE 50 MG
50 TABLET ORAL THREE TIMES A DAY
Refills: 0 | Status: DISCONTINUED | OUTPATIENT
Start: 2020-04-27 | End: 2020-04-29

## 2020-04-27 RX ADMIN — Medication 125 MILLIGRAM(S): at 23:04

## 2020-04-27 RX ADMIN — Medication 20 MILLIGRAM(S): at 05:48

## 2020-04-27 RX ADMIN — Medication 125 MILLIGRAM(S): at 17:55

## 2020-04-27 RX ADMIN — RIVAROXABAN 15 MILLIGRAM(S): KIT at 17:55

## 2020-04-27 RX ADMIN — Medication 81 MILLIGRAM(S): at 13:05

## 2020-04-27 RX ADMIN — Medication 125 MILLIGRAM(S): at 13:04

## 2020-04-27 RX ADMIN — PANTOPRAZOLE SODIUM 40 MILLIGRAM(S): 20 TABLET, DELAYED RELEASE ORAL at 05:48

## 2020-04-27 RX ADMIN — NYSTATIN CREAM 1 APPLICATION(S): 100000 CREAM TOPICAL at 23:05

## 2020-04-27 RX ADMIN — NYSTATIN CREAM 1 APPLICATION(S): 100000 CREAM TOPICAL at 05:48

## 2020-04-27 RX ADMIN — Medication 50 MILLIGRAM(S): at 23:04

## 2020-04-27 RX ADMIN — NYSTATIN CREAM 1 APPLICATION(S): 100000 CREAM TOPICAL at 13:06

## 2020-04-27 RX ADMIN — PANTOPRAZOLE SODIUM 40 MILLIGRAM(S): 20 TABLET, DELAYED RELEASE ORAL at 17:55

## 2020-04-27 RX ADMIN — Medication 50 MILLIGRAM(S): at 05:48

## 2020-04-27 RX ADMIN — Medication 50 MILLIGRAM(S): at 14:18

## 2020-04-27 NOTE — DIETITIAN INITIAL EVALUATION ADULT. - INCREASED NUTRIENT NEEDS
Pt states he had to have his esophagus dilated about 5 years ago. Pt states he is feeling like he is having a hard time swallowing like before. Pt was sent her by ENT to get IV fluids.     Jo Lowery RN kcal, protein, micronutrients

## 2020-04-27 NOTE — DIETITIAN INITIAL EVALUATION ADULT. - OTHER INFO
P/w: SOB, diarrhea. Acute hypoxic respiratory failure likely fluid overload with underlying acute CHF exacerbation. Cardio following. Diarrhea: pending C.diff. VANI on CKD stage 3 with HAGMA- pre-renal with diuresis and diarrhea. HTN: stable.

## 2020-04-27 NOTE — PROGRESS NOTE ADULT - SUBJECTIVE AND OBJECTIVE BOX
BRI AGUILAR 79y Female  MRN#: [id number]   CODE STATUS: FULL      SUBJECTIVE  Patient is a 79y old Female who presents with a chief complaint of shortness of breath (27 Apr 2020 14:31)    Currently admitted to medicine with the primary diagnosis of Fluid overload    Today is hospital day 2d, and this morning she is laying in bed comfortably and reports no overnight events.     OBJECTIVE  PAST MEDICAL & SURGICAL HISTORY  History of stomach cancer: 25 years ago-- had stomach surgery  Hyperlipidemia  CAD S/P percutaneous coronary angioplasty  Atrial fibrillation  HTN (hypertension)  Diverticulitis  S/P small bowel resection  H/O abdominal hysterectomy    ALLERGIES:  penicillin (Anaphylaxis)  penicillin (Unknown)    MEDICATIONS:  STANDING MEDICATIONS  aspirin  chewable 81 milliGRAM(s) Oral daily  metoprolol tartrate 50 milliGRAM(s) Oral three times a day  nystatin Powder 1 Application(s) Topical three times a day  pantoprazole    Tablet 40 milliGRAM(s) Oral two times a day  rivaroxaban 15 milliGRAM(s) Oral with dinner  vancomycin    Solution 125 milliGRAM(s) Oral every 6 hours    PRN MEDICATIONS  acetaminophen   Tablet .. 650 milliGRAM(s) Oral every 4 hours PRN  ALBUTerol    90 MICROgram(s) HFA Inhaler 2 Puff(s) Inhalation every 4 hours PRN  ondansetron Injectable 4 milliGRAM(s) IV Push every 6 hours PRN      VITAL SIGNS: Last 24 Hours  T(C): 36.1 (27 Apr 2020 05:22), Max: 36.2 (26 Apr 2020 20:47)  T(F): 97 (27 Apr 2020 05:22), Max: 97.2 (26 Apr 2020 20:47)  HR: 110 (27 Apr 2020 13:24) (101 - 129)  BP: 138/50 (27 Apr 2020 13:24) (96/51 - 138/50)  BP(mean): --  RR: 18 (27 Apr 2020 13:24) (18 - 19)  SpO2: 96% (27 Apr 2020 13:24) (91% - 97%)    LABS:                        13.7   9.94  )-----------( 211      ( 27 Apr 2020 16:54 )             44.6     04-27    131<L>  |  93<L>  |  25<H>  ----------------------------<  111<H>  4.6   |  22  |  1.5    Ca    8.7      27 Apr 2020 16:54  Mg     2.2     04-27    TPro  6.0  /  Alb  3.5  /  TBili  0.8  /  DBili  x   /  AST  32  /  ALT  24  /  AlkPhos  164<H>  04-27      Culture - Blood (collected 25 Apr 2020 07:49)  Source: .Blood Blood  Preliminary Report (26 Apr 2020 18:01):    No growth to date.      CARDIAC MARKERS ( 26 Apr 2020 11:36 )  x     / 0.03 ng/mL / x     / x     / x          RADIOLOGY:    < from: [facility] Duplex Lower Ext Vein Scan, Bilat (04.27.20 @ 12:01) >  Impression:    No evidence of deep venous thrombosis in the bilateral lower extremities.    < end of copied text >      < from: Xray Chest 1 View-PORTABLE IMMEDIATE (04.25.20 @ 08:06) >  IMPRESSION:      New right basilar opacity/effusion. Follow-up after treatment recommended to demonstrate resolution.    < end of copied text >      PHYSICAL EXAM:    GENERAL: NAD, well-developed, AAOx3  HEENT:  Atraumatic, Normocephalic. EOMI, conjunctiva and sclera clear, No JVD  PULMONARY: scattered rhonchi bilaterally  CARDIOVASCULAR: Regular rate and rhythm; No murmurs, rubs, or gallops  GASTROINTESTINAL: Soft, Nontender, Nondistended; Bowel sounds present  MUSCULOSKELETAL: no cyanosis. 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    Acute hypoxic respiratory failure likely fluid overload with likely acute on chronic HFrEF exacerbation / right sided heart failure with mod. pulm. HTN- COVID neg  - c/w NC O2 to maintain SPO2 92-94%/ BIPAP PRN/HS.  - c/w lasix 20mg ivp for now. has lost about 1KG so for since admission.  - repeat CXR today seems largely unchanged to me; f/u official read  - Monitor I&O's/ daily weights/ monitor electrolytes/ maintain K>4/ mag >2.   - fluid restriction to 1.5L/day.   - c/w lopressor/ xarelto.   - albuterol prn for sob.  - TTE noted: EF of 45-50% with mildly dec LVSF/ RV volume and pressure overload/ mod. MR/TR/mod pleural effusion/mod. pulm. HTN  - Cardio consult appreciated- recs followed.   - Bilateral LE doppler neg for dvt.     # A-FIB with RVR:   - increased dose lopressor today, f/u response  - c/w lopressor/ xarelto.     # Diarrhea due to C-diff colitis:   - started on oral vanco today, 4/27  - contact isolation    # VANI on CKD stage 3 with HAGMA- pre-renal with diuresis and diarrhea:   - c/w lasix 20mg IVP for now  - monitor I&O's strictly.   - PM labs continue to improve; will can hold off nephro eval for now   - evaluate in AM if can transition to PO lasix    # HTN- stable   - lopressor increased to 50mg q8 as HR has been persistently elevated      DVT ppx: on xarelto  GI ppx: protonix  Code Status: FULL CODE

## 2020-04-27 NOTE — PROGRESS NOTE ADULT - SUBJECTIVE AND OBJECTIVE BOX
Progress Note:  Provider Speciality                            Hospitalist      BRI AGUILAR MRN-[id number] 79y Female     CHIEF PRESENTING COMPLAINT:  Patient is a 79y old  Female who presents with a chief complaint of shortness of breath (25 Apr 2020 10:39)        SUBJECTIVE:  Patient was seen and examined at bedside.   Reports breathing is little hard resting/ has persistent loose watery diarrhea / concerned about leg swelling.   No significant overnight events reported.     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 pt did not objectively measure temp at home. But did endorse body aches and general lethargy a/w poor po intake. She noticed some palpitations today which prompted her to come to [facility] ED. She lives w/ her daughter and denies any sick contacts and was compliant w/ social isolation and mostly stayed at home. Denies any N/V/D, orthopnea, PND, dysuria, frequency, urgency, or confusion.     In the ED, vitals:  irregular and Tmax 100.7. Was given x1 azithro and rocephin x1. lasix 40 x1 and Cardizem 10 IV push which rate controlled her at 110 bpm. (25 Apr 2020 10:39)        REVIEW OF SYSTEMS:    At least 10 systems were reviewed in ROS. All systems reviewed  are within normal limits except for the complaints as described in Subjective.    PAST MEDICAL & SURGICAL HISTORY:  PAST MEDICAL & SURGICAL HISTORY:  History of stomach cancer: 25 years ago-- had stomach surgery  Hyperlipidemia  CAD S/P percutaneous coronary angioplasty  Atrial fibrillation  HTN (hypertension)  Diverticulitis  S/P small bowel resection  H/O abdominal hysterectomy          VITAL SIGNS:  Vital Signs Last 24 Hrs  T(C): 36.1 (27 Apr 2020 05:22), Max: 36.2 (26 Apr 2020 20:47)  T(F): 97 (27 Apr 2020 05:22), Max: 97.2 (26 Apr 2020 20:47)  HR: 129 (27 Apr 2020 05:22) (94 - 129)  BP: 132/94 (27 Apr 2020 05:22) (95/68 - 132/94)  BP(mean): --  RR: 19 (27 Apr 2020 05:22) (18 - 19)  SpO2: 97% (26 Apr 2020 20:21) (97% - 97%)        PHYSICAL EXAMINATION:    General: AAO, Not in acute distress  HEENT:   EOMI, atraumatic  Heart: S1+S2 audible, no murmur  Lungs: bibasilar ronchi  Abdomen: Soft, non-tender, non-distended (obese).  CNS: AAO  , no focal deficits.  Extremities:  b/l LE edema.         CONSULTS:  Consultant(s) Notes Reviewed by me.   Care Discussed with Consultants/Other Providers where required.        MEDICATIONS:  MEDICATIONS  (STANDING):  aspirin  chewable 81 milliGRAM(s) Oral daily  furosemide   Injectable 20 milliGRAM(s) IV Push daily  metoprolol tartrate 50 milliGRAM(s) Oral two times a day  nystatin Powder 1 Application(s) Topical three times a day  pantoprazole    Tablet 40 milliGRAM(s) Oral two times a day  rivaroxaban 15 milliGRAM(s) Oral with dinner    MEDICATIONS  (PRN):  acetaminophen   Tablet .. 650 milliGRAM(s) Oral every 4 hours PRN Temp greater or equal to 38.5C (101.3F)  ALBUTerol    90 MICROgram(s) HFA Inhaler 2 Puff(s) Inhalation every 4 hours PRN Shortness of Breath and/or Wheezing  ondansetron Injectable 4 milliGRAM(s) IV Push every 6 hours PRN Nausea and/or Vomiting      LABOROTORY DATA/MICROBIOLOGY/I & O's:                        13.3   11.73 )-----------( 145      ( 26 Apr 2020 07:00 )             43.9     04-26    136  |  98  |  23<H>  ----------------------------<  109<H>  4.5   |  23  |  1.6<H>    Ca    8.7      26 Apr 2020 07:00  Phos  3.9     04-25  Mg     1.8     04-25    TPro  5.6<L>  /  Alb  3.1<L>  /  TBili  0.7  /  DBili  x   /  AST  35  /  ALT  21  /  AlkPhos  157<H>  04-26    PT/INR - ( 25 Apr 2020 16:52 )   PT: 18.90 sec;   INR: 1.64 ratio             CAPILLARY BLOOD GLUCOSE                    04-25-20 @ 07:01  -  04-26-20 @ 07:00  --------------------------------------------------------  IN: 300 mL / OUT: 400 mL / NET: -100 mL              ASSESSMENT/Plan:  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    Acute hypoxic respiratory failure likely fluid overload with likely acute on chronic HFrEF exacerbation / right sided heart failure with mod. pulm. HTN / low suspicion for PNA / ruled out COvid PNA:   c/w  NC O2 to maintain SPO2 92-94%/ BIPAP PRN/HS.  c/w lasix 20mg ivp / has lost about 1KG so for since admission.  repeat CXR today.   Monitor I&O's/ daily weights/ monitor electrolytes/ maintain K>4/ mag >2.   fluid restriction to 1.5L/day.   c/w lopressor/ xarelto.   albuterol prn for sob.  TTE. noted: EF of 45-50% with mildly dec LVSF/ RV volume and pressure overload/ mod. MR/TR/mod pleural effusion/mod. pulm.HTN  Cardio consult appreciated- recs followed.   check LE doppler to r/o dvt.     A-FIB with RVR:   HR better controlled.  c/w lopressor/ xarelto.     Diarrhea due to C-diff colitis:   stool C-diff +ve.   started on oral vanco.   contact isolation.    VANI on CKD stage 3 with HAGMA- pre-renal with diuresis and diarrhea:   c/w lasix 20mg IVP.   monitor I&O's strictly.   fu repeat labs today and can hold off nephro eval for now unless renal function worsening.     HTN:   stable BP  c/w lopressor.         dvt ppx on xarelto.          Progress note handoff:   pending: clinical improvement fluid overload/ diarrhea/ renal function.   disposition: unknown at this time.   discussion: plan of care with patient- satisfied.

## 2020-04-27 NOTE — CONSULT NOTE ADULT - ASSESSMENT
IMPRESSION: Rehab of Debilitation C Diff colitis CHF    PRECAUTIONS: [   x ] Cardiac  [  x  ] Respiratory  [    ] Seizures [  x  ] Contact Isolation  [    ] Droplet Isolation  [    ] Other    Weight Bearing Status:     RECOMMENDATION:    Out of Bed to Chair ONCE DIARRHEA LESSENS     DVT/Decubiti Prophylaxis    REHAB PLAN:     [    x ] Bedside P/T 3-5 times a week   [     ] Bedside O/T  2-3 times a week   [     ] No Rehab Therapy Indicated   [     ]  Speech Therapy   Conditioning/ROM                                 ADL  Bed Mobility                                            Conditioning/ROM  Transfers                                                  Bed Mobility  Sitting /Standing Balance                      Transfers                                        Gait Training                                            Sitting/Standing Balance  Stair Training [   ]Applicable                 Home equipment Eval                                                                     Splinting  [   ] Only      GOALS:   ADL   [   x ]   Independent         Transfers  [  x  ] Independent            Ambulation  [   x  ] Independent     [    x ] With device?                            [    ]  CG                                               [    ]  CG                                                    [     ] CG                            [    ] Min A                                          [    ] Min A                                                [     ] Min  A          DISCHARGE PLAN:   [     ]  Good candidate for Intensive Rehabilitation/Hospital based                                             Will tolerate 3hrs Intensive Rehab Daily                                       [  x    ]  Short Term Rehab in Skilled Nursing Facility                                 VS                                     [   x   ]  Home with Outpatient or VN services -? Walker on DC                                         [      ]  Possible Candidate for Intensive Hospital based Rehab

## 2020-04-27 NOTE — DIETITIAN INITIAL EVALUATION ADULT. - FACTORS AFF FOOD INTAKE
likely acuity of illness, no GI distress or chew/swallow difficulty noted, frequent BM's, last BM 4/26

## 2020-04-27 NOTE — DIETITIAN INITIAL EVALUATION ADULT. - ENERGY NEEDS
Sound bundle commuication sent via email to: Tyson Archibald () Wendy Cornejo (CHANA) Samara Head (CINDY) Ivania Barboza RN CM Ext [id number] calorie [id number] (30-35kcal/kg IBW) for pressure ulcer  protein 71-83g (1.2-1.4g/kg IBW) VANI on CKD considered  fluid per LIP for CHF

## 2020-04-27 NOTE — CONSULT NOTE ADULT - SUBJECTIVE AND OBJECTIVE BOX
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 pt did not objectively measure temp at home. But did endorse body aches and general lethargy a/w poor po intake. She noticed some palpitations today which prompted her to come to [facility] ED. She lives w/ her daughter and denies any sick contacts and was compliant w/ social isolation and mostly stayed at home. Denies any N/V/D, orthopnea, PND, dysuria, frequency, urgency, or confusion.     In the ED, vitals:  irregular and Tmax 100.7. Was given x1 azithro and rocephin x1. lasix 40 x1 and Cardizem 10 IV push which rate controlled her at 110 bpm. (25 Apr 2020 10:39)      PAST MEDICAL & SURGICAL HISTORY:  History of stomach cancer: 25 years ago-- had stomach surgery  Hyperlipidemia  CAD S/P percutaneous coronary angioplasty  Atrial fibrillation  HTN (hypertension)  Diverticulitis  S/P small bowel resection  H/O abdominal hysterectomy      Hospital Course: cute hypoxic respiratory failure likely fluid overload with likely acute on chronic HFrEF exacerbation / right sided heart failure with mod. pulm. HTN / low suspicion for PNA / ruled out COvid PNA:   c/w  NC O2 to maintain SPO2 92-94%/ BIPAP PRN/HS.  c/w lasix 20mg ivp / has lost about 1KG so for since admission.  repeat CXR today.   Monitor I&O's/ daily weights/ monitor electrolytes/ maintain K>4/ mag >2.   fluid restriction to 1.5L/day.   c/w lopressor/ xarelto.   albuterol prn for sob.  TTE. noted: EF of 45-50% with mildly dec LVSF/ RV volume and pressure overload/ mod. MR/TR/mod pleural effusion/mod. pulm.HTN  Cardio consult appreciated- recs followed.   check LE doppler to r/o dvt.     A-FIB with RVR:   HR better controlled.  c/w lopressor/ xarelto.     Diarrhea due to C-diff colitis:   stool C-diff +ve.   started on oral vanco.   contact isolation.    VANI on CKD stage 3 with HAGMA- pre-renal with diuresis and diarrhea:   c/w lasix 20mg IVP.   monitor I&O's strictly.   fu repeat labs today and can hold off nephro eval for now unless renal function worsening.     HTN:   stable BP  c/w lopressor.         dvt ppx on xarelto.       TODAY'S SUBJECTIVE & REVIEW OF SYMPTOMS:     Constitutional Weakness   Cardio WNL   Resp SOB   GI WNL  Heme WNL  Endo WNL  Skin WNL  MSK WNL  Neuro WNL  Cognitive WNL  Psych WNL      MEDICATIONS  (STANDING):  aspirin  chewable 81 milliGRAM(s) Oral daily  furosemide   Injectable 20 milliGRAM(s) IV Push daily  metoprolol tartrate 50 milliGRAM(s) Oral three times a day  nystatin Powder 1 Application(s) Topical three times a day  pantoprazole    Tablet 40 milliGRAM(s) Oral two times a day  rivaroxaban 15 milliGRAM(s) Oral with dinner  vancomycin    Solution 125 milliGRAM(s) Oral every 6 hours    MEDICATIONS  (PRN):  acetaminophen   Tablet .. 650 milliGRAM(s) Oral every 4 hours PRN Temp greater or equal to 38.5C (101.3F)  ALBUTerol    90 MICROgram(s) HFA Inhaler 2 Puff(s) Inhalation every 4 hours PRN Shortness of Breath and/or Wheezing  ondansetron Injectable 4 milliGRAM(s) IV Push every 6 hours PRN Nausea and/or Vomiting      FAMILY HISTORY:  No pertinent family history in first degree relatives      Allergies    penicillin (Anaphylaxis)  penicillin (Unknown)    Intolerances        SOCIAL HISTORY:    [    ] Etoh  [    ] Smoking  [    ] Substance abuse     Home Environment:  [    ] Home Alone  [ x   ] Lives with Family Daughter  [    ] Home Health Aid    Dwelling:  [    ] Apartment  [  x  ] Private House  [    ] Adult Home  [    ] Skilled Nursing Facility      [    ] Short Term  [    ] Long Term  [ x   ] Stairs     None                      [    ] Elevator     FUNCTIONAL STATUS PTA: (Check all that apply)  Ambulation: [ x    ]Independent    [    ] Dependent     [    ] Non-Ambulatory  Assistive Device: [    ] SA Cane  [    ]  Q Cane  [    ] Walker  [    ]  Wheelchair  ADL : [  x  ] Independent  [    ]  Dependent       Vital Signs Last 24 Hrs  T(C): 36.1 (27 Apr 2020 05:22), Max: 36.2 (26 Apr 2020 20:47)  T(F): 97 (27 Apr 2020 05:22), Max: 97.2 (26 Apr 2020 20:47)  HR: 110 (27 Apr 2020 13:24) (101 - 129)  BP: 138/50 (27 Apr 2020 13:24) (95/68 - 138/50)  BP(mean): --  RR: 18 (27 Apr 2020 13:24) (18 - 19)  SpO2: 96% (27 Apr 2020 13:24) (91% - 97%)      PHYSICAL EXAM: Alert & Oriented X3 On O2 weak  GENERAL: NAD, well-groomed, well-developed  HEAD:  Atraumatic, Normocephalic  EYES: EOMI, PERRLA, conjunctiva and sclera clear  NECK: Supple  CHEST/LUNG: diminished BS bases  HEART: Regular rate and rhythm  ABDOMEN: Soft, Nontender, Nondistended; Bowel sounds present  EXTREMITIES:  no calf tenderness,1-2+ edema BLES    NERVOUS SYSTEM:  Cranial Nerves 2-12 intact [   x ] Abnormal  [    ]  ROM: WFL all extremities [   x ]  Abnormal [     ]  Motor Strength: WFL all extremities  [x    ]  Abnormal [    ]4/5  Sensation: intact to light touch [  x  ] Abnormal [    ]      FUNCTIONAL STATUS:  Bed Mobility: [   ]  Independent [    ]  Supervision [  x  ]  Needs Assistance [  ]  N/A  Transfers: [    ]  Independent [    ]  Supervision [    ]  Needs Assistance [  x  ]  N/A    Ambulation:  [    ]  Independent [    ]  Supervision [    ]  Needs Assistance [x    ]  N/A   ADL:  [    ]   Independent [  x  ] Requires Assistance [    ] N/A       LABS:                        13.3   11.73 )-----------( 145      ( 26 Apr 2020 07:00 )             43.9     04-26    136  |  98  |  23<H>  ----------------------------<  109<H>  4.5   |  23  |  1.6<H>    Ca    8.7      26 Apr 2020 07:00  Phos  3.9     04-25  Mg     1.8     04-25    TPro  5.6<L>  /  Alb  3.1<L>  /  TBili  0.7  /  DBili  x   /  AST  35  /  ALT  21  /  AlkPhos  157<H>  04-26    PT/INR - ( 25 Apr 2020 16:52 )   PT: 18.90 sec;   INR: 1.64 ratio               RADIOLOGY & ADDITIONAL STUDIES:

## 2020-04-28 LAB
ALBUMIN SERPL ELPH-MCNC: 3.2 G/DL — LOW (ref 3.5–5.2)
ALP SERPL-CCNC: 148 U/L — HIGH (ref 30–115)
ALT FLD-CCNC: 24 U/L — SIGNIFICANT CHANGE UP (ref 0–41)
ANION GAP SERPL CALC-SCNC: 17 MMOL/L — HIGH (ref 7–14)
ANION GAP SERPL CALC-SCNC: 17 MMOL/L — HIGH (ref 7–14)
AST SERPL-CCNC: 33 U/L — SIGNIFICANT CHANGE UP (ref 0–41)
BASOPHILS # BLD AUTO: 0.04 K/UL — SIGNIFICANT CHANGE UP (ref 0–0.2)
BASOPHILS NFR BLD AUTO: 0.5 % — SIGNIFICANT CHANGE UP (ref 0–1)
BILIRUB SERPL-MCNC: 0.9 MG/DL — SIGNIFICANT CHANGE UP (ref 0.2–1.2)
BUN SERPL-MCNC: 27 MG/DL — HIGH (ref 10–20)
BUN SERPL-MCNC: 31 MG/DL — HIGH (ref 10–20)
CALCIUM SERPL-MCNC: 8.7 MG/DL — SIGNIFICANT CHANGE UP (ref 8.5–10.1)
CALCIUM SERPL-MCNC: 9.3 MG/DL — SIGNIFICANT CHANGE UP (ref 8.5–10.1)
CHLORIDE SERPL-SCNC: 92 MMOL/L — LOW (ref 98–110)
CHLORIDE SERPL-SCNC: 93 MMOL/L — LOW (ref 98–110)
CO2 SERPL-SCNC: 22 MMOL/L — SIGNIFICANT CHANGE UP (ref 17–32)
CO2 SERPL-SCNC: 24 MMOL/L — SIGNIFICANT CHANGE UP (ref 17–32)
CREAT SERPL-MCNC: 1.7 MG/DL — HIGH (ref 0.7–1.5)
CREAT SERPL-MCNC: 1.7 MG/DL — HIGH (ref 0.7–1.5)
EOSINOPHIL # BLD AUTO: 0.09 K/UL — SIGNIFICANT CHANGE UP (ref 0–0.7)
EOSINOPHIL NFR BLD AUTO: 1.1 % — SIGNIFICANT CHANGE UP (ref 0–8)
GLUCOSE SERPL-MCNC: 151 MG/DL — HIGH (ref 70–99)
GLUCOSE SERPL-MCNC: 89 MG/DL — SIGNIFICANT CHANGE UP (ref 70–99)
HCT VFR BLD CALC: 42.1 % — SIGNIFICANT CHANGE UP (ref 37–47)
HGB BLD-MCNC: 13 G/DL — SIGNIFICANT CHANGE UP (ref 12–16)
IMM GRANULOCYTES NFR BLD AUTO: 0.6 % — HIGH (ref 0.1–0.3)
LYMPHOCYTES # BLD AUTO: 0.92 K/UL — LOW (ref 1.2–3.4)
LYMPHOCYTES # BLD AUTO: 11.4 % — LOW (ref 20.5–51.1)
MAGNESIUM SERPL-MCNC: 2.1 MG/DL — SIGNIFICANT CHANGE UP (ref 1.8–2.4)
MCHC RBC-ENTMCNC: 26.1 PG — LOW (ref 27–31)
MCHC RBC-ENTMCNC: 30.9 G/DL — LOW (ref 32–37)
MCV RBC AUTO: 84.4 FL — SIGNIFICANT CHANGE UP (ref 81–99)
MONOCYTES # BLD AUTO: 1.1 K/UL — HIGH (ref 0.1–0.6)
MONOCYTES NFR BLD AUTO: 13.7 % — HIGH (ref 1.7–9.3)
NEUTROPHILS # BLD AUTO: 5.85 K/UL — SIGNIFICANT CHANGE UP (ref 1.4–6.5)
NEUTROPHILS NFR BLD AUTO: 72.7 % — SIGNIFICANT CHANGE UP (ref 42.2–75.2)
NRBC # BLD: 0 /100 WBCS — SIGNIFICANT CHANGE UP (ref 0–0)
PLATELET # BLD AUTO: 172 K/UL — SIGNIFICANT CHANGE UP (ref 130–400)
POTASSIUM SERPL-MCNC: 4.2 MMOL/L — SIGNIFICANT CHANGE UP (ref 3.5–5)
POTASSIUM SERPL-MCNC: 4.6 MMOL/L — SIGNIFICANT CHANGE UP (ref 3.5–5)
POTASSIUM SERPL-SCNC: 4.2 MMOL/L — SIGNIFICANT CHANGE UP (ref 3.5–5)
POTASSIUM SERPL-SCNC: 4.6 MMOL/L — SIGNIFICANT CHANGE UP (ref 3.5–5)
PROT SERPL-MCNC: 5.2 G/DL — LOW (ref 6–8)
RBC # BLD: 4.99 M/UL — SIGNIFICANT CHANGE UP (ref 4.2–5.4)
RBC # FLD: 18.6 % — HIGH (ref 11.5–14.5)
SODIUM SERPL-SCNC: 131 MMOL/L — LOW (ref 135–146)
SODIUM SERPL-SCNC: 134 MMOL/L — LOW (ref 135–146)
WBC # BLD: 8.05 K/UL — SIGNIFICANT CHANGE UP (ref 4.8–10.8)
WBC # FLD AUTO: 8.05 K/UL — SIGNIFICANT CHANGE UP (ref 4.8–10.8)

## 2020-04-28 PROCEDURE — 99233 SBSQ HOSP IP/OBS HIGH 50: CPT

## 2020-04-28 RX ORDER — FUROSEMIDE 40 MG
20 TABLET ORAL ONCE
Refills: 0 | Status: COMPLETED | OUTPATIENT
Start: 2020-04-28 | End: 2020-04-28

## 2020-04-28 RX ADMIN — RIVAROXABAN 15 MILLIGRAM(S): KIT at 17:35

## 2020-04-28 RX ADMIN — Medication 125 MILLIGRAM(S): at 05:11

## 2020-04-28 RX ADMIN — NYSTATIN CREAM 1 APPLICATION(S): 100000 CREAM TOPICAL at 21:25

## 2020-04-28 RX ADMIN — Medication 125 MILLIGRAM(S): at 17:35

## 2020-04-28 RX ADMIN — Medication 20 MILLIGRAM(S): at 13:09

## 2020-04-28 RX ADMIN — Medication 125 MILLIGRAM(S): at 11:04

## 2020-04-28 RX ADMIN — Medication 125 MILLIGRAM(S): at 23:26

## 2020-04-28 RX ADMIN — NYSTATIN CREAM 1 APPLICATION(S): 100000 CREAM TOPICAL at 05:11

## 2020-04-28 RX ADMIN — Medication 50 MILLIGRAM(S): at 05:11

## 2020-04-28 RX ADMIN — PANTOPRAZOLE SODIUM 40 MILLIGRAM(S): 20 TABLET, DELAYED RELEASE ORAL at 05:11

## 2020-04-28 RX ADMIN — Medication 50 MILLIGRAM(S): at 13:09

## 2020-04-28 RX ADMIN — Medication 81 MILLIGRAM(S): at 11:05

## 2020-04-28 RX ADMIN — PANTOPRAZOLE SODIUM 40 MILLIGRAM(S): 20 TABLET, DELAYED RELEASE ORAL at 17:35

## 2020-04-28 RX ADMIN — NYSTATIN CREAM 1 APPLICATION(S): 100000 CREAM TOPICAL at 11:22

## 2020-04-28 RX ADMIN — Medication 50 MILLIGRAM(S): at 21:25

## 2020-04-28 NOTE — PROGRESS NOTE ADULT - SUBJECTIVE AND OBJECTIVE BOX
Progress Note:  Provider Speciality                            Hospitalist      BRI AGUILAR MRN-[id number] 79y Female     CHIEF PRESENTING COMPLAINT:  Patient is a 79y old  Female who presents with a chief complaint of shortness of breath (25 Apr 2020 10:39)        SUBJECTIVE:  Patient was seen and examined at bedside.   sleepy in the morning but easily arousable /tired/ reports diarrhea little better and breathing is also better than before.   No significant overnight events reported.     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 pt did not objectively measure temp at home. But did endorse body aches and general lethargy a/w poor po intake. She noticed some palpitations today which prompted her to come to [facility] ED. She lives w/ her daughter and denies any sick contacts and was compliant w/ social isolation and mostly stayed at home. Denies any N/V/D, orthopnea, PND, dysuria, frequency, urgency, or confusion.     In the ED, vitals:  irregular and Tmax 100.7. Was given x1 azithro and rocephin x1. lasix 40 x1 and Cardizem 10 IV push which rate controlled her at 110 bpm. (25 Apr 2020 10:39)        REVIEW OF SYSTEMS:    At least 10 systems were reviewed in ROS. All systems reviewed  are within normal limits except for the complaints as described in Subjective.    PAST MEDICAL & SURGICAL HISTORY:  PAST MEDICAL & SURGICAL HISTORY:  History of stomach cancer: 25 years ago-- had stomach surgery  Hyperlipidemia  CAD S/P percutaneous coronary angioplasty  Atrial fibrillation  HTN (hypertension)  Diverticulitis  S/P small bowel resection  H/O abdominal hysterectomy          VITAL SIGNS:  Vital Signs Last 24 Hrs  T(C): 36.1 (27 Apr 2020 05:22), Max: 36.2 (26 Apr 2020 20:47)  T(F): 97 (27 Apr 2020 05:22), Max: 97.2 (26 Apr 2020 20:47)  HR: 129 (27 Apr 2020 05:22) (94 - 129)  BP: 132/94 (27 Apr 2020 05:22) (95/68 - 132/94)  BP(mean): --  RR: 19 (27 Apr 2020 05:22) (18 - 19)  SpO2: 97% (26 Apr 2020 20:21) (97% - 97%)        PHYSICAL EXAMINATION:    General: AAO, Not in acute distress  HEENT:   EOMI, atraumatic  Heart: S1+S2 audible, no murmur  Lungs: bibasilar ronchi  Abdomen: Soft, non-tender, non-distended (obese).  CNS: AAO  , no focal deficits.  Extremities:  b/l LE edema.         CONSULTS:  Consultant(s) Notes Reviewed by me.   Care Discussed with Consultants/Other Providers where required.        MEDICATIONS:  MEDICATIONS  (STANDING):  aspirin  chewable 81 milliGRAM(s) Oral daily  furosemide   Injectable 20 milliGRAM(s) IV Push daily  metoprolol tartrate 50 milliGRAM(s) Oral two times a day  nystatin Powder 1 Application(s) Topical three times a day  pantoprazole    Tablet 40 milliGRAM(s) Oral two times a day  rivaroxaban 15 milliGRAM(s) Oral with dinner    MEDICATIONS  (PRN):  acetaminophen   Tablet .. 650 milliGRAM(s) Oral every 4 hours PRN Temp greater or equal to 38.5C (101.3F)  ALBUTerol    90 MICROgram(s) HFA Inhaler 2 Puff(s) Inhalation every 4 hours PRN Shortness of Breath and/or Wheezing  ondansetron Injectable 4 milliGRAM(s) IV Push every 6 hours PRN Nausea and/or Vomiting      LABOROTORY DATA/MICROBIOLOGY/I & O's:                        13.3   11.73 )-----------( 145      ( 26 Apr 2020 07:00 )             43.9     04-26    136  |  98  |  23<H>  ----------------------------<  109<H>  4.5   |  23  |  1.6<H>    Ca    8.7      26 Apr 2020 07:00  Phos  3.9     04-25  Mg     1.8     04-25    TPro  5.6<L>  /  Alb  3.1<L>  /  TBili  0.7  /  DBili  x   /  AST  35  /  ALT  21  /  AlkPhos  157<H>  04-26    PT/INR - ( 25 Apr 2020 16:52 )   PT: 18.90 sec;   INR: 1.64 ratio             CAPILLARY BLOOD GLUCOSE                    04-25-20 @ 07:01  -  04-26-20 @ 07:00  --------------------------------------------------------  IN: 300 mL / OUT: 400 mL / NET: -100 mL              ASSESSMENT/Plan:  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    Acute hypoxic respiratory failure likely fluid overload with likely acute on chronic HFrEF exacerbation / right sided heart failure with mod. pulm. HTN / low suspicion for PNA / ruled out COvid PNA:   check SPO2 on 2L NC O2.c/w  NC O2 to maintain SPO2 92-94%/ BIPAP PRN/HS.  repeat CXR from yesterday shows worsening b/l opacities/effusions.   will give oral dose of lasix 20mg now and repeat bmp at 4pm - if renal function does not worsen- give another dose of po lasix 20mg pm dose.   Monitor I&O's/ daily weights- need accurate readings/ monitor electrolytes/ maintain K>4/ mag >2.   fluid restriction to 1.5L/day.   c/w lopressor/ xarelto.   albuterol prn for sob.  TTE. noted: EF of 45-50% with mildly dec LVSF/ RV volume and pressure overload/ mod. MR/TR/mod pleural effusion/mod. pulm.HTN  Cardio evaluated.   check LE doppler to r/o dvt. negative for dvt.     A-FIB with RVR:   's 130's yesterday  Cardio suggested to increase lopressor to tid - current dose.   today HR high 90's to 110 range.   c/w lopressor 50mg tid/ xarelto.     Diarrhea due to C-diff colitis:   diarrhea little better.  stool C-diff +ve.   continue oral vanco.   contact isolation.    VANI on CKD stage 3 with HAGMA- pre-renal with diuresis and diarrhea:   renal function worsening  switched to oral lasix 20mg dose now followed by repeat bmp at 4 before another dose for tonight.  monitor I&O's strictly. need accurate OP -communicated to nurse.   nephrology eval- Dr. Gore.     HTN:   stable BP  c/w lopressor.         dvt ppx on xarelto.          Progress note handoff:   pending: clinical improvement fluid overload/ diarrhea/ renal function.   disposition: unknown at this time.   discussion: plan of care with patient- satisfied.

## 2020-04-28 NOTE — PHYSICAL THERAPY INITIAL EVALUATION ADULT - GENERAL OBSERVATIONS, REHAB EVAL
Attempted to see pt for b/s Initial Evaluation, pt requiring assist for hygiene 2* to diarrhea. Will f/u for PT.
Spoke with Lois ZALDIVAR, recommended to hold PT 2* to multiple epsidodes of diarrhea, possible r/u for C-diff.
Pt rec'd in bed awake, pleasant and cooperative and w/o compl.  Pt was left sitting in b/s recliner in No apparent distress and w/o compl.  +O2 (2L), +vac, +tele monitor

## 2020-04-28 NOTE — PHYSICAL THERAPY INITIAL EVALUATION ADULT - CRITERIA FOR SKILLED THERAPEUTIC INTERVENTIONS
rehab potential/predicted duration of therapy intervention/anticipated equipment needs at discharge/risk reduction/prevention/therapy frequency/anticipated discharge recommendation/impairments found/functional limitations in following categories

## 2020-04-28 NOTE — PHYSICAL THERAPY INITIAL EVALUATION ADULT - IMPAIRMENTS FOUND, PT EVAL
arousal, attention, and cognition/gait, locomotion, and balance/aerobic capacity/endurance/muscle strength

## 2020-04-28 NOTE — PROGRESS NOTE ADULT - SUBJECTIVE AND OBJECTIVE BOX
BRI AGUILAR 79y Female  MRN#: [id number]   CODE STATUS: FULL      SUBJECTIVE  Patient is a 79y old Female who presents with a chief complaint of shortness of breath (28 Apr 2020 12:30)    Currently admitted to medicine with the primary diagnosis of Fluid overload     Today is hospital day 3d, and this morning she is laying in bed comfortably and reports no overnight events.     OBJECTIVE  PAST MEDICAL & SURGICAL HISTORY  History of stomach cancer: 25 years ago-- had stomach surgery  Hyperlipidemia  CAD S/P percutaneous coronary angioplasty  Atrial fibrillation  HTN (hypertension)  Diverticulitis  S/P small bowel resection  H/O abdominal hysterectomy    ALLERGIES:  penicillin (Anaphylaxis)  penicillin (Unknown)    MEDICATIONS:  STANDING MEDICATIONS  aspirin  chewable 81 milliGRAM(s) Oral daily  metoprolol tartrate 50 milliGRAM(s) Oral three times a day  nystatin Powder 1 Application(s) Topical three times a day  pantoprazole    Tablet 40 milliGRAM(s) Oral two times a day  rivaroxaban 15 milliGRAM(s) Oral with dinner  vancomycin    Solution 125 milliGRAM(s) Oral every 6 hours    PRN MEDICATIONS  acetaminophen   Tablet .. 650 milliGRAM(s) Oral every 4 hours PRN  ALBUTerol    90 MICROgram(s) HFA Inhaler 2 Puff(s) Inhalation every 4 hours PRN  ondansetron Injectable 4 milliGRAM(s) IV Push every 6 hours PRN      VITAL SIGNS: Last 24 Hours  T(C): 36 (28 Apr 2020 05:33), Max: 36.9 (27 Apr 2020 20:20)  T(F): 96.8 (28 Apr 2020 05:33), Max: 98.4 (27 Apr 2020 20:20)  HR: 109 (28 Apr 2020 05:33) (99 - 109)  BP: 117/69 (28 Apr 2020 05:33) (117/69 - 121/73)  BP(mean): --  RR: 18 (28 Apr 2020 05:33) (18 - 18)  SpO2: --    LABS:                        13.0   8.05  )-----------( 172      ( 28 Apr 2020 07:13 )             42.1     04-28    131<L>  |  92<L>  |  27<H>  ----------------------------<  89  4.6   |  22  |  1.7<H>    Ca    8.7      28 Apr 2020 07:13  Mg     2.1     04-28    TPro  5.2<L>  /  Alb  3.2<L>  /  TBili  0.9  /  DBili  x   /  AST  33  /  ALT  24  /  AlkPhos  148<H>  04-28        RADIOLOGY:    < from: Xray Chest 1 View- PORTABLE-Urgent (04.27.20 @ 20:56) >  Impression:      Cardiomegaly. Bilateral opacifications and effusions, worsening.    < end of copied text >      < from: [facility] Duplex Lower Ext Vein Scan, Bilat (04.27.20 @ 12:01) >  Impression:    No evidence of deep venous thrombosis in the bilateral lower extremities.    < end of copied text >      < from: Xray Chest 1 View-PORTABLE IMMEDIATE (04.25.20 @ 08:06) >  IMPRESSION:      New right basilar opacity/effusion. Follow-up after treatment recommended to demonstrate resolution.    < end of copied text >      PHYSICAL EXAM:    GENERAL: NAD, well-developed, AAOx3  HEENT:  Atraumatic, Normocephalic. EOMI, conjunctiva and sclera clear, No JVD  PULMONARY: scattered rhonchi bilaterally  CARDIOVASCULAR: Regular rate and rhythm; No murmurs, rubs, or gallops  GASTROINTESTINAL: Soft, Nontender, Nondistended; Bowel sounds present  MUSCULOSKELETAL: no cyanosis. 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    Acute hypoxic respiratory failure likely fluid overload with likely acute on chronic HFrEF exacerbation / right sided heart failure with mod. pulm. HTN- COVID neg  - c/w NC O2 to maintain SPO2 92-94%/ BIPAP PRN/HS.  - received lasix 20mg PO 1PM. f/u 4PM BMP and give additional PM dose if Cr stable  - repeat CXR 4/27 as above; worsening bilateral opacifications and effusion  - Monitor I&O's/ daily weights/ monitor electrolytes/ maintain K>4/ mag >2.   - fluid restriction to 1.5L/day.   - c/w lopressor/ xarelto.   - albuterol prn for sob.  - TTE noted: EF of 45-50% with mildly dec LVSF/ RV volume and pressure overload/ mod. MR/TR/mod pleural effusion/mod. pulm. HTN  - Cardio consult appreciated- recs followed.   - Bilateral LE doppler neg for dvt.   - nephro consult ordered    # A-FIB with RVR- remains stable ~100  - increased dose lopressor 4/27  - c/w lopressor/ xarelto.     # Diarrhea due to C-diff colitis:   - started on oral vanco 4/27  - contact isolation    # VANI on CKD stage 3 with HAGMA- pre-renal with diuresis and diarrhea:   -  received lasix 20mg PO 1PM. f/u 4PM BMP and give additional PM dose if Cr stable  - monitor I&O's strictly.   - nephro consult ordered    # HTN- stable   - increased lopressor to 50mg q8 4/27      DVT ppx: on xarelto  GI ppx: protonix  Code Status: FULL CODE BRI AGUILAR 79y Female  MRN#: [id number]   CODE STATUS: FULL      SUBJECTIVE  Patient is a 79y old Female who presents with a chief complaint of shortness of breath (28 Apr 2020 12:30)    Currently admitted to medicine with the primary diagnosis of Fluid overload     Today is hospital day 3d, and this morning she is laying in bed comfortably and reports no overnight events.     OBJECTIVE  PAST MEDICAL & SURGICAL HISTORY  History of stomach cancer: 25 years ago-- had stomach surgery  Hyperlipidemia  CAD S/P percutaneous coronary angioplasty  Atrial fibrillation  HTN (hypertension)  Diverticulitis  S/P small bowel resection  H/O abdominal hysterectomy    ALLERGIES:  penicillin (Anaphylaxis)  penicillin (Unknown)    MEDICATIONS:  STANDING MEDICATIONS  aspirin  chewable 81 milliGRAM(s) Oral daily  metoprolol tartrate 50 milliGRAM(s) Oral three times a day  nystatin Powder 1 Application(s) Topical three times a day  pantoprazole    Tablet 40 milliGRAM(s) Oral two times a day  rivaroxaban 15 milliGRAM(s) Oral with dinner  vancomycin    Solution 125 milliGRAM(s) Oral every 6 hours    PRN MEDICATIONS  acetaminophen   Tablet .. 650 milliGRAM(s) Oral every 4 hours PRN  ALBUTerol    90 MICROgram(s) HFA Inhaler 2 Puff(s) Inhalation every 4 hours PRN  ondansetron Injectable 4 milliGRAM(s) IV Push every 6 hours PRN      VITAL SIGNS: Last 24 Hours  T(C): 36 (28 Apr 2020 05:33), Max: 36.9 (27 Apr 2020 20:20)  T(F): 96.8 (28 Apr 2020 05:33), Max: 98.4 (27 Apr 2020 20:20)  HR: 109 (28 Apr 2020 05:33) (99 - 109)  BP: 117/69 (28 Apr 2020 05:33) (117/69 - 121/73)  BP(mean): --  RR: 18 (28 Apr 2020 05:33) (18 - 18)  SpO2: --    LABS:                        13.0   8.05  )-----------( 172      ( 28 Apr 2020 07:13 )             42.1     04-28    131<L>  |  92<L>  |  27<H>  ----------------------------<  89  4.6   |  22  |  1.7<H>    Ca    8.7      28 Apr 2020 07:13  Mg     2.1     04-28    TPro  5.2<L>  /  Alb  3.2<L>  /  TBili  0.9  /  DBili  x   /  AST  33  /  ALT  24  /  AlkPhos  148<H>  04-28        RADIOLOGY:    < from: Xray Chest 1 View- PORTABLE-Urgent (04.27.20 @ 20:56) >  Impression:      Cardiomegaly. Bilateral opacifications and effusions, worsening.    < end of copied text >      < from: [facility] Duplex Lower Ext Vein Scan, Bilat (04.27.20 @ 12:01) >  Impression:    No evidence of deep venous thrombosis in the bilateral lower extremities.    < end of copied text >      < from: Xray Chest 1 View-PORTABLE IMMEDIATE (04.25.20 @ 08:06) >  IMPRESSION:      New right basilar opacity/effusion. Follow-up after treatment recommended to demonstrate resolution.    < end of copied text >      PHYSICAL EXAM:    GENERAL: NAD, well-developed, AAOx3  HEENT:  Atraumatic, Normocephalic. EOMI, conjunctiva and sclera clear, No JVD  PULMONARY: scattered rhonchi bilaterally; mildly improved from yesterday  CARDIOVASCULAR: Regular rate and rhythm; No murmurs, rubs, or gallops  GASTROINTESTINAL: Soft, Nontender, Nondistended; Bowel sounds present  MUSCULOSKELETAL: no cyanosis. 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    Acute hypoxic respiratory failure likely fluid overload with likely acute on chronic HFrEF exacerbation / right sided heart failure with mod. pulm. HTN- COVID neg  - c/w NC O2 to maintain SPO2 92-94%/ BIPAP PRN/HS.  - received lasix 20mg PO 1PM. f/u 4PM BMP and give additional PM dose if Cr stable  - repeat CXR 4/27 as above; worsening bilateral opacifications and effusion  - Monitor I&O's/ daily weights/ monitor electrolytes/ maintain K>4/ mag >2.   - fluid restriction to 1.5L/day.   - c/w lopressor/ xarelto.   - albuterol prn for sob.  - TTE noted: EF of 45-50% with mildly dec LVSF/ RV volume and pressure overload/ mod. MR/TR/mod pleural effusion/mod. pulm. HTN  - Cardio consult appreciated- recs followed.   - Bilateral LE doppler neg for dvt.   - nephro consult ordered    # A-FIB with RVR- remains stable ~100  - increased dose lopressor 4/27  - c/w lopressor/ xarelto.     # Diarrhea due to C-diff colitis:   - started on oral vanco 4/27  - contact isolation    # VANI on CKD stage 3 with HAGMA- pre-renal with diuresis and diarrhea:   -  received lasix 20mg PO 1PM. f/u 4PM BMP and give additional PM dose if Cr stable  - monitor I&O's strictly.   - nephro consult ordered    # HTN- stable   - increased lopressor to 50mg q8 4/27      DVT ppx: on xarelto  GI ppx: protonix  Code Status: FULL CODE

## 2020-04-29 LAB
ALBUMIN SERPL ELPH-MCNC: 3.2 G/DL — LOW (ref 3.5–5.2)
ALP SERPL-CCNC: 180 U/L — HIGH (ref 30–115)
ALT FLD-CCNC: 35 U/L — SIGNIFICANT CHANGE UP (ref 0–41)
ANION GAP SERPL CALC-SCNC: 15 MMOL/L — HIGH (ref 7–14)
AST SERPL-CCNC: 52 U/L — HIGH (ref 0–41)
BASOPHILS # BLD AUTO: 0.03 K/UL — SIGNIFICANT CHANGE UP (ref 0–0.2)
BASOPHILS NFR BLD AUTO: 0.4 % — SIGNIFICANT CHANGE UP (ref 0–1)
BILIRUB SERPL-MCNC: 0.8 MG/DL — SIGNIFICANT CHANGE UP (ref 0.2–1.2)
BUN SERPL-MCNC: 29 MG/DL — HIGH (ref 10–20)
CALCIUM SERPL-MCNC: 8.7 MG/DL — SIGNIFICANT CHANGE UP (ref 8.5–10.1)
CHLORIDE SERPL-SCNC: 94 MMOL/L — LOW (ref 98–110)
CO2 SERPL-SCNC: 27 MMOL/L — SIGNIFICANT CHANGE UP (ref 17–32)
CREAT SERPL-MCNC: 1.5 MG/DL — SIGNIFICANT CHANGE UP (ref 0.7–1.5)
EOSINOPHIL # BLD AUTO: 0.1 K/UL — SIGNIFICANT CHANGE UP (ref 0–0.7)
EOSINOPHIL NFR BLD AUTO: 1.2 % — SIGNIFICANT CHANGE UP (ref 0–8)
GLUCOSE SERPL-MCNC: 98 MG/DL — SIGNIFICANT CHANGE UP (ref 70–99)
HCT VFR BLD CALC: 42.6 % — SIGNIFICANT CHANGE UP (ref 37–47)
HGB BLD-MCNC: 13.2 G/DL — SIGNIFICANT CHANGE UP (ref 12–16)
IMM GRANULOCYTES NFR BLD AUTO: 0.7 % — HIGH (ref 0.1–0.3)
LYMPHOCYTES # BLD AUTO: 1.23 K/UL — SIGNIFICANT CHANGE UP (ref 1.2–3.4)
LYMPHOCYTES # BLD AUTO: 14.8 % — LOW (ref 20.5–51.1)
MAGNESIUM SERPL-MCNC: 2.2 MG/DL — SIGNIFICANT CHANGE UP (ref 1.8–2.4)
MCHC RBC-ENTMCNC: 25.9 PG — LOW (ref 27–31)
MCHC RBC-ENTMCNC: 31 G/DL — LOW (ref 32–37)
MCV RBC AUTO: 83.5 FL — SIGNIFICANT CHANGE UP (ref 81–99)
MONOCYTES # BLD AUTO: 1.01 K/UL — HIGH (ref 0.1–0.6)
MONOCYTES NFR BLD AUTO: 12.1 % — HIGH (ref 1.7–9.3)
NEUTROPHILS # BLD AUTO: 5.89 K/UL — SIGNIFICANT CHANGE UP (ref 1.4–6.5)
NEUTROPHILS NFR BLD AUTO: 70.8 % — SIGNIFICANT CHANGE UP (ref 42.2–75.2)
NRBC # BLD: 0 /100 WBCS — SIGNIFICANT CHANGE UP (ref 0–0)
PLATELET # BLD AUTO: 164 K/UL — SIGNIFICANT CHANGE UP (ref 130–400)
POTASSIUM SERPL-MCNC: 4.9 MMOL/L — SIGNIFICANT CHANGE UP (ref 3.5–5)
POTASSIUM SERPL-SCNC: 4.9 MMOL/L — SIGNIFICANT CHANGE UP (ref 3.5–5)
PROT SERPL-MCNC: 5.6 G/DL — LOW (ref 6–8)
RBC # BLD: 5.1 M/UL — SIGNIFICANT CHANGE UP (ref 4.2–5.4)
RBC # FLD: 18.6 % — HIGH (ref 11.5–14.5)
SODIUM SERPL-SCNC: 136 MMOL/L — SIGNIFICANT CHANGE UP (ref 135–146)
WBC # BLD: 8.32 K/UL — SIGNIFICANT CHANGE UP (ref 4.8–10.8)
WBC # FLD AUTO: 8.32 K/UL — SIGNIFICANT CHANGE UP (ref 4.8–10.8)

## 2020-04-29 PROCEDURE — 71045 X-RAY EXAM CHEST 1 VIEW: CPT | Mod: 26

## 2020-04-29 PROCEDURE — 99233 SBSQ HOSP IP/OBS HIGH 50: CPT

## 2020-04-29 PROCEDURE — 76770 US EXAM ABDO BACK WALL COMP: CPT | Mod: 26

## 2020-04-29 RX ORDER — FUROSEMIDE 40 MG
40 TABLET ORAL ONCE
Refills: 0 | Status: COMPLETED | OUTPATIENT
Start: 2020-04-29 | End: 2020-04-29

## 2020-04-29 RX ORDER — METOPROLOL TARTRATE 50 MG
100 TABLET ORAL
Refills: 0 | Status: DISCONTINUED | OUTPATIENT
Start: 2020-04-29 | End: 2020-05-06

## 2020-04-29 RX ORDER — FUROSEMIDE 40 MG
40 TABLET ORAL
Refills: 0 | Status: DISCONTINUED | OUTPATIENT
Start: 2020-04-29 | End: 2020-05-03

## 2020-04-29 RX ADMIN — Medication 125 MILLIGRAM(S): at 17:00

## 2020-04-29 RX ADMIN — Medication 100 MILLIGRAM(S): at 16:53

## 2020-04-29 RX ADMIN — PANTOPRAZOLE SODIUM 40 MILLIGRAM(S): 20 TABLET, DELAYED RELEASE ORAL at 05:32

## 2020-04-29 RX ADMIN — Medication 125 MILLIGRAM(S): at 05:32

## 2020-04-29 RX ADMIN — NYSTATIN CREAM 1 APPLICATION(S): 100000 CREAM TOPICAL at 05:31

## 2020-04-29 RX ADMIN — Medication 40 MILLIGRAM(S): at 13:28

## 2020-04-29 RX ADMIN — RIVAROXABAN 15 MILLIGRAM(S): KIT at 16:45

## 2020-04-29 RX ADMIN — NYSTATIN CREAM 1 APPLICATION(S): 100000 CREAM TOPICAL at 13:29

## 2020-04-29 RX ADMIN — Medication 50 MILLIGRAM(S): at 13:29

## 2020-04-29 RX ADMIN — Medication 50 MILLIGRAM(S): at 05:32

## 2020-04-29 RX ADMIN — Medication 40 MILLIGRAM(S): at 21:40

## 2020-04-29 RX ADMIN — Medication 125 MILLIGRAM(S): at 23:13

## 2020-04-29 RX ADMIN — NYSTATIN CREAM 1 APPLICATION(S): 100000 CREAM TOPICAL at 21:40

## 2020-04-29 RX ADMIN — Medication 81 MILLIGRAM(S): at 11:11

## 2020-04-29 RX ADMIN — PANTOPRAZOLE SODIUM 40 MILLIGRAM(S): 20 TABLET, DELAYED RELEASE ORAL at 17:00

## 2020-04-29 RX ADMIN — Medication 125 MILLIGRAM(S): at 11:11

## 2020-04-29 NOTE — CONSULT NOTE ADULT - ASSESSMENT
VANI   - Cr 1.7--> 1.5 today  baseline Cr 0.7 in 2018  acute on chronic HFpEF   - cxray improved, off O2 and clinically less dyspneic since prior few days  right heart failure  EF 45% with mod MR, mod TR, mod pulm HTN, RV overload and R pleural effusion  Cdiff diarrhea  afib with RVR    plan:    lasix 40mg iv q24h x 24-48h, then change to lasix 40mg po qd  off ACE-I  monitor lytes and renal function  fluid restriction  daily wt  strict I/O  obtain renal sono  check UA and U prot/cr  po vanco  contact isolation

## 2020-04-29 NOTE — PROGRESS NOTE ADULT - SUBJECTIVE AND OBJECTIVE BOX
patient is sob today   still with diarrhea   no chest pain       Vital Signs Last 24 Hrs  T(C): 36 (29 Apr 2020 05:24), Max: 36.7 (28 Apr 2020 20:45)  T(F): 96.8 (29 Apr 2020 05:24), Max: 98.1 (28 Apr 2020 20:45)  HR: 116 (29 Apr 2020 05:24) (105 - 116)  BP: 122/82 (29 Apr 2020 05:24) (117/75 - 122/82)  BP(mean): --  RR: 18 (29 Apr 2020 05:24) (18 - 18)  SpO2: --    PHYSICAL EXAM:  GENERAL: NAD,   Pulm: basal crackles   CV: IRRegular rate and rhythm;   GI: Soft, Nontender, Nondistended; Bowel sounds present  EXTREMITIES:  +edema  PSYCH: AAOx3  NEUROLOGY: non-focal  SKIN: No rashes or lesions                          13.2   8.32  )-----------( 164      ( 29 Apr 2020 05:40 )             42.6     04-29    136  |  94<L>  |  29<H>  ----------------------------<  98  4.9   |  27  |  1.5    Ca    8.7      29 Apr 2020 05:40  Mg     2.2     04-29    TPro  5.6<L>  /  Alb  3.2<L>  /  TBili  0.8  /  DBili  x   /  AST  52<H>  /  ALT  35  /  AlkPhos  180<H>  04-29    LIVER FUNCTIONS - ( 29 Apr 2020 05:40 )  Alb: 3.2 g/dL / Pro: 5.6 g/dL / ALK PHOS: 180 U/L / ALT: 35 U/L / AST: 52 U/L / GGT: x                 MEDICATIONS  (STANDING):  aspirin  chewable 81 milliGRAM(s) Oral daily  metoprolol tartrate 50 milliGRAM(s) Oral three times a day  nystatin Powder 1 Application(s) Topical three times a day  pantoprazole    Tablet 40 milliGRAM(s) Oral two times a day  rivaroxaban 15 milliGRAM(s) Oral with dinner  vancomycin    Solution 125 milliGRAM(s) Oral every 6 hours    MEDICATIONS  (PRN):  acetaminophen   Tablet .. 650 milliGRAM(s) Oral every 4 hours PRN Temp greater or equal to 38.5C (101.3F)  ALBUTerol    90 MICROgram(s) HFA Inhaler 2 Puff(s) Inhalation every 4 hours PRN Shortness of Breath and/or Wheezing  ondansetron Injectable 4 milliGRAM(s) IV Push every 6 hours PRN Nausea and/or Vomiting

## 2020-04-29 NOTE — PROGRESS NOTE ADULT - SUBJECTIVE AND OBJECTIVE BOX
BRI AGUILAR 79y Female  MRN#: [id number]   CODE STATUS: FULL      SUBJECTIVE  Patient is a 79y old Female who presents with a chief complaint of shortness of breath (29 Apr 2020 12:49)    Currently admitted to medicine with the primary diagnosis of Fluid overload     Hospital course has been complicated by   Today is hospital day 4d, and this morning she is laying in bed comfortably and reports no overnight events.     Present Today:           Dior Catheter ()No/ ()Yes?   Indication:             Central Line ()No/ ()Yes?   Indication:          IV Fluids ()No/ ()Yes? Type:  Rate:  Indication:    OBJECTIVE  PAST MEDICAL & SURGICAL HISTORY  History of stomach cancer: 25 years ago-- had stomach surgery  Hyperlipidemia  CAD S/P percutaneous coronary angioplasty  Atrial fibrillation  HTN (hypertension)  Diverticulitis  S/P small bowel resection  H/O abdominal hysterectomy    ALLERGIES:  penicillin (Anaphylaxis)  penicillin (Unknown)    MEDICATIONS:  STANDING MEDICATIONS  aspirin  chewable 81 milliGRAM(s) Oral daily  furosemide   Injectable 40 milliGRAM(s) IV Push <User Schedule>  metoprolol tartrate 50 milliGRAM(s) Oral three times a day  nystatin Powder 1 Application(s) Topical three times a day  pantoprazole    Tablet 40 milliGRAM(s) Oral two times a day  rivaroxaban 15 milliGRAM(s) Oral with dinner  vancomycin    Solution 125 milliGRAM(s) Oral every 6 hours    PRN MEDICATIONS  acetaminophen   Tablet .. 650 milliGRAM(s) Oral every 4 hours PRN  ALBUTerol    90 MICROgram(s) HFA Inhaler 2 Puff(s) Inhalation every 4 hours PRN  ondansetron Injectable 4 milliGRAM(s) IV Push every 6 hours PRN      VITAL SIGNS: Last 24 Hours  T(C): 36 (29 Apr 2020 05:24), Max: 36.7 (28 Apr 2020 20:45)  T(F): 96.8 (29 Apr 2020 05:24), Max: 98.1 (28 Apr 2020 20:45)  HR: 116 (29 Apr 2020 05:24) (105 - 116)  BP: 122/82 (29 Apr 2020 05:24) (117/82 - 122/82)  BP(mean): --  RR: 18 (29 Apr 2020 05:24) (18 - 18)  SpO2: --    LABS:                        13.2   8.32  )-----------( 164      ( 29 Apr 2020 05:40 )             42.6     04-29    136  |  94<L>  |  29<H>  ----------------------------<  98  4.9   |  27  |  1.5    Ca    8.7      29 Apr 2020 05:40  Mg     2.2     04-29    TPro  5.6<L>  /  Alb  3.2<L>  /  TBili  0.8  /  DBili  x   /  AST  52<H>  /  ALT  35  /  AlkPhos  180<H>  04-29      RADIOLOGY:    < from: Xray Chest 1 View- PORTABLE-Urgent (04.27.20 @ 20:56) >  Impression:      Cardiomegaly. Bilateral opacifications and effusions, worsening.    < end of copied text >      < from: [facility] Duplex Lower Ext Vein Scan, Bilat (04.27.20 @ 12:01) >  Impression:    No evidence of deep venous thrombosis in the bilateral lower extremities.    < end of copied text >      < from: Xray Chest 1 View-PORTABLE IMMEDIATE (04.25.20 @ 08:06) >  IMPRESSION:      New right basilar opacity/effusion. Follow-up after treatment recommended to demonstrate resolution.    < end of copied text >      PHYSICAL EXAM:    GENERAL: NAD, well-developed, AAOx3  HEENT:  Atraumatic, Normocephalic. EOMI, conjunctiva and sclera clear, No JVD  PULMONARY: bibasilar crackles  CARDIOVASCULAR: Regular rate and rhythm; No murmurs, rubs, or gallops  GASTROINTESTINAL: Soft, Nontender, Nondistended; Bowel sounds present  MUSCULOSKELETAL: no cyanosis. 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    Acute hypoxic respiratory failure likely fluid overload with likely acute on chronic HFrEF exacerbation / right sided heart failure with mod. pulm. HTN- COVID neg  - c/w NC O2 to maintain SPO2 92-94%/ BIPAP PRN/HS.  - received lasix 40mg IV ~1PM, ordered daily for 12PM  - Monitor I&O's/ daily weights/ monitor electrolytes/ maintain K>4/ mag >2  - fluid restriction to 1.5L/day  - c/w lopressor/ xarelto.   - albuterol prn for sob.  - TTE noted: EF of 45-50% with mildly dec LVSF/ RV volume and pressure overload/ mod. MR/TR/mod pleural effusion/mod. pulm. HTN  - Cardio consult appreciated- recs followed.   - Bilateral LE doppler neg for dvt.   - nephro consult appreciated    # A-FIB with RVR- remains stable ~100  - switched to metoprolol 100 BID today, 4/29 PM  - c/w lopressor/ xarelto.     # Diarrhea due to C-diff colitis:   - started on oral vanco 4/27  - contact isolation    # VANI on CKD stage 3 with HAGMA- pre-renal with diuresis and diarrhea:   - received lasix 40mg IV ~1PM, ordered daily for 12PM  - monitor I&O's strictly  - nephro consult appreciated    # HTN- stable   - switched to metoprolol 100 BID today, 4/29 PM      DVT ppx: on xarelto  GI ppx: protonix  Code Status: FULL CODE

## 2020-04-29 NOTE — CONSULT NOTE ADULT - SUBJECTIVE AND OBJECTIVE BOX
NEPHROLOGY CONSULTATION NOTE    Patient is a 79y Female whom presented to the hospital with     PAST MEDICAL & SURGICAL HISTORY:  History of stomach cancer: 25 years ago-- had stomach surgery  Hyperlipidemia  CAD S/P percutaneous coronary angioplasty  Atrial fibrillation  HTN (hypertension)  Diverticulitis  S/P small bowel resection  H/O abdominal hysterectomy    Allergies:  penicillin (Anaphylaxis)  penicillin (Unknown)    Home Medications Reviewed    SOCIAL HISTORY:  Denies ETOH,Smoking,   FAMILY HISTORY:  No pertinent family history in first degree relatives        REVIEW OF SYSTEMS:  CONSTITUTIONAL: No weakness, fevers or chills  EYES/ENT: No visual changes;  No vertigo or throat pain   NECK: No pain or stiffness  RESPIRATORY: No cough, wheezing, hemoptysis; No shortness of breath  CARDIOVASCULAR: No chest pain or palpitations.  GASTROINTESTINAL: No abdominal or epigastric pain. No nausea, vomiting, or hematemesis; No diarrhea or constipation. No melena or hematochezia.  GENITOURINARY: No dysuria, frequency, foamy urine, urinary urgency, incontinence or hematuria  NEUROLOGICAL: No numbness or weakness  SKIN: No itching, burning, rashes, or lesions   VASCULAR: No bilateral lower extremity edema.   All other review of systems is negative unless indicated above.    PHYSICAL EXAM:  Constitutional: NAD  HEENT: anicteric sclera, oropharynx clear, MMM  Neck: No JVD  Respiratory: CTAB, no wheezes, rales or rhonchi  Cardiovascular: S1, S2, RRR  Gastrointestinal: BS+, soft, NT/ND  Extremities: No cyanosis or clubbing. No peripheral edema  Neurological: A/O x 3, no focal deficits  Psychiatric: Normal mood, normal affect  : No CVA tenderness. No jacob.   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 pt did not objectively measure temp at home. But did endorse body aches and general lethargy a/w poor po intake. She noticed some palpitations today which prompted her to come to [facility] ED. She lives w/ her daughter and denies any sick contacts and was compliant w/ social isolation and mostly stayed at home. Denies any N/V/D, orthopnea, PND, dysuria, frequency, urgency, or confusion.     In the ED, vitals:  irregular and Tmax 100.7. Was given x1 azithro and rocephin x1. lasix 40 x1 and Cardizem 10 IV push which rate controlled her at 110 bpm.     Renal consulted for diuretic management in setting of diarrhea, fluid overload and VANI    PAST MEDICAL & SURGICAL HISTORY:  History of stomach cancer: 25 years ago-- had stomach surgery  Hyperlipidemia  CAD S/P percutaneous coronary angioplasty  Atrial fibrillation  HTN (hypertension)  Diverticulitis  S/P small bowel resection  H/O abdominal hysterectomy    Allergies:  penicillin (Anaphylaxis)  penicillin (Unknown)    Home Medications Reviewed    SOCIAL HISTORY:  Denies ETOH,Smoking,   FAMILY HISTORY:  No pertinent family history in first degree relatives        REVIEW OF SYSTEMS:  CONSTITUTIONAL: + weakness, no fevers or chills  EYES/ENT: No visual changes;  No vertigo or throat pain   NECK: No pain or stiffness  RESPIRATORY: No cough, wheezing, hemoptysis; No shortness of breath  CARDIOVASCULAR: No chest pain or palpitations.  GASTROINTESTINAL: No abdominal or epigastric pain. No nausea, vomiting, or hematemesis; No diarrhea or constipation. No melena or hematochezia.  GENITOURINARY: No dysuria, frequency, foamy urine, urinary urgency, incontinence or hematuria  NEUROLOGICAL: No numbness or weakness  SKIN: No itching, burning, rashes, or lesions   VASCULAR: No bilateral lower extremity edema.   All other review of systems is negative unless indicated above.    PHYSICAL EXAM:  Constitutional: NAD  HEENT: anicteric sclera, oropharynx clear, MMM  Neck: No JVD  Respiratory: CTAB, no wheezes, rales or rhonchi  Cardiovascular: S1, S2, RRR  Gastrointestinal: BS+, soft, NT/ND  Extremities: No cyanosis or clubbing. No peripheral edema  Neurological: A/O x 3, no focal deficits  Psychiatric: Normal mood, normal affect  : No CVA tenderness. No jacob.   Skin: No rashes    Hospital Medications:   MEDICATIONS  (STANDING):  aspirin  chewable 81 milliGRAM(s) Oral daily  furosemide   Injectable 40 milliGRAM(s) IV Push <User Schedule>  metoprolol tartrate 100 milliGRAM(s) Oral two times a day  nystatin Powder 1 Application(s) Topical three times a day  pantoprazole    Tablet 40 milliGRAM(s) Oral two times a day  rivaroxaban 15 milliGRAM(s) Oral with dinner  vancomycin    Solution 125 milliGRAM(s) Oral every 6 hours        VITALS:  T(F): 97 (04-29-20 @ 14:17), Max: 98.1 (04-28-20 @ 20:45)  HR: 85 (04-29-20 @ 14:17)  BP: 123/63 (04-29-20 @ 14:17)  RR: 20 (04-29-20 @ 14:17)  SpO2: --  Wt(kg): --    04-27 @ 07:01  -  04-28 @ 07:00  --------------------------------------------------------  IN: 320 mL / OUT: 900 mL / NET: -580 mL    04-28 @ 07:01  -  04-29 @ 07:00  --------------------------------------------------------  IN: 120 mL / OUT: 0 mL / NET: 120 mL    04-29 @ 07:01 - 04-29 @ 19:10  --------------------------------------------------------  IN: 450 mL / OUT: 0 mL / NET: 450 mL          LABS:  04-29    136  |  94<L>  |  29<H>  ----------------------------<  98  4.9   |  27  |  1.5    Ca    8.7      29 Apr 2020 05:40  Mg     2.2     04-29    TPro  5.6<L>  /  Alb  3.2<L>  /  TBili  0.8  /  DBili      /  AST  52<H>  /  ALT  35  /  AlkPhos  180<H>  04-29                          13.2   8.32  )-----------( 164      ( 29 Apr 2020 05:40 )             42.6       Urine Studies:        RADIOLOGY & ADDITIONAL STUDIES:

## 2020-04-29 NOTE — PROGRESS NOTE ADULT - ASSESSMENT
#acute on chronic diastolic chf:   chest xray still with effusions B/L and patient is SOB   patient is fluid overloaded   start IV lasix 40 qday for now cr is 1.5   monitor serum cr       A-FIB   HR better controlled on lopressor 50 q8h will change to 1000 q12h   on xarelto     #Diarrhea due to C-diff colitis:   still with diarrhea multiple episodes overnight and this morning   c/w po vanco     #VANI on CKD stage 3 scr 1.5 stable monitor while on IV lasix     #HTN: controlled         #dvt ppx on xarelto.          Progress note handoff:   pending: clinical improvement fluid overload/ diarrhea  disposition: unknown at this time.   discussion: plan of care with patient

## 2020-04-30 LAB
ALBUMIN SERPL ELPH-MCNC: 3.2 G/DL — LOW (ref 3.5–5.2)
ALP SERPL-CCNC: 228 U/L — HIGH (ref 30–115)
ALT FLD-CCNC: 44 U/L — HIGH (ref 0–41)
ANION GAP SERPL CALC-SCNC: 13 MMOL/L — SIGNIFICANT CHANGE UP (ref 7–14)
APPEARANCE UR: ABNORMAL
AST SERPL-CCNC: 53 U/L — HIGH (ref 0–41)
BACTERIA # UR AUTO: NEGATIVE — SIGNIFICANT CHANGE UP
BASOPHILS # BLD AUTO: 0.04 K/UL — SIGNIFICANT CHANGE UP (ref 0–0.2)
BASOPHILS NFR BLD AUTO: 0.4 % — SIGNIFICANT CHANGE UP (ref 0–1)
BILIRUB SERPL-MCNC: 0.7 MG/DL — SIGNIFICANT CHANGE UP (ref 0.2–1.2)
BILIRUB UR-MCNC: NEGATIVE — SIGNIFICANT CHANGE UP
BUN SERPL-MCNC: 26 MG/DL — HIGH (ref 10–20)
CALCIUM SERPL-MCNC: 9.3 MG/DL — SIGNIFICANT CHANGE UP (ref 8.5–10.1)
CHLORIDE SERPL-SCNC: 94 MMOL/L — LOW (ref 98–110)
CO2 SERPL-SCNC: 31 MMOL/L — SIGNIFICANT CHANGE UP (ref 17–32)
COLOR SPEC: SIGNIFICANT CHANGE UP
CREAT ?TM UR-MCNC: 19 MG/DL — SIGNIFICANT CHANGE UP
CREAT SERPL-MCNC: 1.5 MG/DL — SIGNIFICANT CHANGE UP (ref 0.7–1.5)
CULTURE RESULTS: SIGNIFICANT CHANGE UP
DIFF PNL FLD: NEGATIVE — SIGNIFICANT CHANGE UP
EOSINOPHIL # BLD AUTO: 0.14 K/UL — SIGNIFICANT CHANGE UP (ref 0–0.7)
EOSINOPHIL NFR BLD AUTO: 1.3 % — SIGNIFICANT CHANGE UP (ref 0–8)
EPI CELLS # UR: 0 /HPF — SIGNIFICANT CHANGE UP (ref 0–5)
GLUCOSE SERPL-MCNC: 112 MG/DL — HIGH (ref 70–99)
GLUCOSE UR QL: NEGATIVE — SIGNIFICANT CHANGE UP
HCT VFR BLD CALC: 43.5 % — SIGNIFICANT CHANGE UP (ref 37–47)
HGB BLD-MCNC: 13.5 G/DL — SIGNIFICANT CHANGE UP (ref 12–16)
HYALINE CASTS # UR AUTO: 1 /LPF — SIGNIFICANT CHANGE UP (ref 0–7)
IMM GRANULOCYTES NFR BLD AUTO: 0.7 % — HIGH (ref 0.1–0.3)
KETONES UR-MCNC: NEGATIVE — SIGNIFICANT CHANGE UP
LEUKOCYTE ESTERASE UR-ACNC: ABNORMAL
LYMPHOCYTES # BLD AUTO: 1.21 K/UL — SIGNIFICANT CHANGE UP (ref 1.2–3.4)
LYMPHOCYTES # BLD AUTO: 11.2 % — LOW (ref 20.5–51.1)
MAGNESIUM SERPL-MCNC: 1.8 MG/DL — SIGNIFICANT CHANGE UP (ref 1.8–2.4)
MCHC RBC-ENTMCNC: 25.7 PG — LOW (ref 27–31)
MCHC RBC-ENTMCNC: 31 G/DL — LOW (ref 32–37)
MCV RBC AUTO: 82.9 FL — SIGNIFICANT CHANGE UP (ref 81–99)
MONOCYTES # BLD AUTO: 1.18 K/UL — HIGH (ref 0.1–0.6)
MONOCYTES NFR BLD AUTO: 10.9 % — HIGH (ref 1.7–9.3)
NEUTROPHILS # BLD AUTO: 8.15 K/UL — HIGH (ref 1.4–6.5)
NEUTROPHILS NFR BLD AUTO: 75.5 % — HIGH (ref 42.2–75.2)
NITRITE UR-MCNC: NEGATIVE — SIGNIFICANT CHANGE UP
NRBC # BLD: 0 /100 WBCS — SIGNIFICANT CHANGE UP (ref 0–0)
PH UR: 7 — SIGNIFICANT CHANGE UP (ref 5–8)
PLATELET # BLD AUTO: 206 K/UL — SIGNIFICANT CHANGE UP (ref 130–400)
POTASSIUM SERPL-MCNC: 3.5 MMOL/L — SIGNIFICANT CHANGE UP (ref 3.5–5)
POTASSIUM SERPL-SCNC: 3.5 MMOL/L — SIGNIFICANT CHANGE UP (ref 3.5–5)
PROT ?TM UR-MCNC: 15 MG/DLG/24H — SIGNIFICANT CHANGE UP
PROT SERPL-MCNC: 5.9 G/DL — LOW (ref 6–8)
PROT UR-MCNC: SIGNIFICANT CHANGE UP
PROT/CREAT UR-RTO: 0.8 RATIO — HIGH (ref 0–0.2)
RBC # BLD: 5.25 M/UL — SIGNIFICANT CHANGE UP (ref 4.2–5.4)
RBC # FLD: 18.7 % — HIGH (ref 11.5–14.5)
RBC CASTS # UR COMP ASSIST: 1 /HPF — SIGNIFICANT CHANGE UP (ref 0–4)
SODIUM SERPL-SCNC: 138 MMOL/L — SIGNIFICANT CHANGE UP (ref 135–146)
SP GR SPEC: 1.01 — LOW (ref 1.01–1.02)
SPECIMEN SOURCE: SIGNIFICANT CHANGE UP
UROBILINOGEN FLD QL: SIGNIFICANT CHANGE UP
WBC # BLD: 10.8 K/UL — SIGNIFICANT CHANGE UP (ref 4.8–10.8)
WBC # FLD AUTO: 10.8 K/UL — SIGNIFICANT CHANGE UP (ref 4.8–10.8)
WBC UR QL: 173 /HPF — HIGH (ref 0–5)

## 2020-04-30 PROCEDURE — 99233 SBSQ HOSP IP/OBS HIGH 50: CPT

## 2020-04-30 RX ORDER — FUROSEMIDE 40 MG
40 TABLET ORAL ONCE
Refills: 0 | Status: COMPLETED | OUTPATIENT
Start: 2020-04-30 | End: 2020-04-30

## 2020-04-30 RX ORDER — POTASSIUM CHLORIDE 20 MEQ
40 PACKET (EA) ORAL ONCE
Refills: 0 | Status: COMPLETED | OUTPATIENT
Start: 2020-04-30 | End: 2020-04-30

## 2020-04-30 RX ORDER — MAGNESIUM SULFATE 500 MG/ML
1 VIAL (ML) INJECTION ONCE
Refills: 0 | Status: COMPLETED | OUTPATIENT
Start: 2020-04-30 | End: 2020-04-30

## 2020-04-30 RX ADMIN — PANTOPRAZOLE SODIUM 40 MILLIGRAM(S): 20 TABLET, DELAYED RELEASE ORAL at 05:45

## 2020-04-30 RX ADMIN — Medication 125 MILLIGRAM(S): at 05:45

## 2020-04-30 RX ADMIN — Medication 40 MILLIGRAM(S): at 09:59

## 2020-04-30 RX ADMIN — NYSTATIN CREAM 1 APPLICATION(S): 100000 CREAM TOPICAL at 05:45

## 2020-04-30 RX ADMIN — Medication 100 MILLIGRAM(S): at 17:30

## 2020-04-30 RX ADMIN — PANTOPRAZOLE SODIUM 40 MILLIGRAM(S): 20 TABLET, DELAYED RELEASE ORAL at 17:30

## 2020-04-30 RX ADMIN — NYSTATIN CREAM 1 APPLICATION(S): 100000 CREAM TOPICAL at 12:38

## 2020-04-30 RX ADMIN — Medication 100 MILLIGRAM(S): at 05:45

## 2020-04-30 RX ADMIN — Medication 125 MILLIGRAM(S): at 17:29

## 2020-04-30 RX ADMIN — Medication 100 GRAM(S): at 15:52

## 2020-04-30 RX ADMIN — NYSTATIN CREAM 1 APPLICATION(S): 100000 CREAM TOPICAL at 21:33

## 2020-04-30 RX ADMIN — Medication 125 MILLIGRAM(S): at 23:01

## 2020-04-30 RX ADMIN — Medication 125 MILLIGRAM(S): at 11:46

## 2020-04-30 RX ADMIN — Medication 40 MILLIEQUIVALENT(S): at 11:45

## 2020-04-30 RX ADMIN — Medication 81 MILLIGRAM(S): at 11:45

## 2020-04-30 RX ADMIN — RIVAROXABAN 15 MILLIGRAM(S): KIT at 17:30

## 2020-04-30 NOTE — PROGRESS NOTE ADULT - SUBJECTIVE AND OBJECTIVE BOX
BRI AGUILAR 79y Female  MRN#: [id number]   CODE STATUS: FULL      SUBJECTIVE  Patient is a 79y old Female who presents with a chief complaint of shortness of breath (30 Apr 2020 10:26)    Currently admitted to medicine with the primary diagnosis of Fluid overload     Today is hospital day 5d, and this morning she is laying in bed comfortably and reports no overnight events.     OBJECTIVE  PAST MEDICAL & SURGICAL HISTORY  History of stomach cancer: 25 years ago-- had stomach surgery  Hyperlipidemia  CAD S/P percutaneous coronary angioplasty  Atrial fibrillation  HTN (hypertension)  Diverticulitis  S/P small bowel resection  H/O abdominal hysterectomy    ALLERGIES:  penicillin (Anaphylaxis)  penicillin (Unknown)    MEDICATIONS:  STANDING MEDICATIONS  aspirin  chewable 81 milliGRAM(s) Oral daily  furosemide   Injectable 40 milliGRAM(s) IV Push <User Schedule>  metoprolol tartrate 100 milliGRAM(s) Oral two times a day  nystatin Powder 1 Application(s) Topical three times a day  pantoprazole    Tablet 40 milliGRAM(s) Oral two times a day  rivaroxaban 15 milliGRAM(s) Oral with dinner  vancomycin    Solution 125 milliGRAM(s) Oral every 6 hours    PRN MEDICATIONS  acetaminophen   Tablet .. 650 milliGRAM(s) Oral every 4 hours PRN  ALBUTerol    90 MICROgram(s) HFA Inhaler 2 Puff(s) Inhalation every 4 hours PRN  ondansetron Injectable 4 milliGRAM(s) IV Push every 6 hours PRN      VITAL SIGNS: Last 24 Hours  T(C): 36.1 (30 Apr 2020 05:40), Max: 36.6 (29 Apr 2020 20:57)  T(F): 97 (30 Apr 2020 05:40), Max: 97.8 (29 Apr 2020 20:57)  HR: 104 (30 Apr 2020 05:40) (85 - 104)  BP: 136/88 (30 Apr 2020 05:40) (123/63 - 136/88)  BP(mean): --  RR: 19 (30 Apr 2020 05:40) (19 - 20)  SpO2: 95% (29 Apr 2020 19:33) (95% - 95%)      LABS:                        13.5   10.80 )-----------( 206      ( 30 Apr 2020 05:16 )             43.5     04-30    138  |  94<L>  |  26<H>  ----------------------------<  112<H>  3.5   |  31  |  1.5    Ca    9.3      30 Apr 2020 05:16  Mg     1.8     04-30    TPro  5.9<L>  /  Alb  3.2<L>  /  TBili  0.7  /  DBili  x   /  AST  53<H>  /  ALT  44<H>  /  AlkPhos  228<H>  04-30      RADIOLOGY:    < from: Xray Chest 1 View- PORTABLE-Urgent (04.27.20 @ 20:56) >  Impression:      Cardiomegaly. Bilateral opacifications and effusions, worsening.    < end of copied text >      < from: VA Duplex Lower Ext Vein Scan, Bilat (04.27.20 @ 12:01) >  Impression:    No evidence of deep venous thrombosis in the bilateral lower extremities.    < end of copied text >      < from: Xray Chest 1 View-PORTABLE IMMEDIATE (04.25.20 @ 08:06) >  IMPRESSION:      New right basilar opacity/effusion. Follow-up after treatment recommended to demonstrate resolution.    < end of copied text >      PHYSICAL EXAM:    GENERAL: NAD, well-developed, AAOx3  HEENT:  Atraumatic, Normocephalic. EOMI, conjunctiva and sclera clear, No JVD  PULMONARY: bibasilar crackles  CARDIOVASCULAR: Regular rate and rhythm; No murmurs, rubs, or gallops  GASTROINTESTINAL: Soft, Nontender, Nondistended; Bowel sounds present  MUSCULOSKELETAL: no cyanosis. 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    Acute hypoxic respiratory failure likely fluid overload with likely acute on chronic HFrEF exacerbation / right sided heart failure with mod. pulm. HTN- COVID neg  - c/w NC O2 to maintain SPO2 92-94%  - on lasix 40mg IV  - Monitor I&O's/ daily weights/ monitor electrolytes/ maintain K>4/ mag >2  - fluid restriction to 1.5L/day  - c/w lopressor/ xarelto.   - albuterol prn for sob.  - TTE noted: EF of 45-50% with mildly dec LVSF/ RV volume and pressure overload/ mod. MR/TR/mod pleural effusion/mod. pulm. HTN  - Cardio consult appreciated- recs followed.   - Bilateral LE doppler neg for dvt.   - nephro consult appreciated  - f/u am cxr    # A-FIB with RVR- remains stable ~100  - switched to metoprolol 100 BID 4/29 PM  - c/w lopressor/ xarelto.     # Diarrhea due to C-diff colitis:   - started on oral vanco 4/27  - contact isolation    # VANI on CKD stage 3 with HAGMA- pre-renal with diuresis and diarrhea:   - on lasix 40mg IV  - monitor I&O's strictly- jacob placed  - nephro consult appreciated    # HTN- stable   - switched to metoprolol 100 BID 4/29 PM      DVT ppx: on xarelto  GI ppx: protonix  Code Status: FULL CODE

## 2020-04-30 NOTE — CHART NOTE - NSCHARTNOTEFT_GEN_A_CORE
Registered Dietitian Follow-Up     Patient Profile Reviewed                           Yes [x]   No []     Nutrition History Previously Obtained        Yes [x]  No []       Pertinent Subjective Information: PO intake remains ~50% at meals, recommended supplement has been ordered. Pending d/c to NH.      Pertinent Medical Interventions: Acute hypoxic respiratory failure likely fluid overload with likely acute on chronic HFrEF exacerbation / right sided heart failure with mod. pulm. HTN- COVID neg. Cardio following.  A-FIB with RVR- remains stable. C.dff +, on Vanco.  VANI on CKD stage 3 with HAGMA- pre-renal with diuresis and diarrhea:   s/p Nephro consult.     Diet order: DASH/TLC, lactose restricted, Ensure compact BID     Anthropometrics:  - Ht. 167.6cm  - Wt. 92.9kg on 4/30 vs. 91.2kg on 4/27- pt. with edema, will continue to monitor wt trends   - %wt change  - BMI 32.5  - IBW 130lbs      Pertinent Lab Data: (4/30) Cl 94, BUN 26, glu 112, AST 53, ALT 44, eGFR 33     Pertinent Meds: Lasix, Metoprolol, Zofran, Protonix      Physical Findings:  - Appearance: alert&oriented, 3+ L, R ankle edema   - GI function: no symptoms noted, last BM 4/29   - Tubes: none noted  - Oral/Mouth cavity: no symptoms noted  - Skin: pressure ulcer stg II to R buttocks      Nutrition Requirements (from RD note on 4/27)   Weight Used: ideal 59.1kg      Estimated Energy Needs    Continue [x]  Adjust [] 1772-2068kcal (30-35kcal/kg IBW) for pressure ulcer  Adjusted Energy Recommendations:   kcal/day        Estimated Protein Needs    Continue [x]  Adjust [] 71-83g (1.2-1.4g/kg IBW) VANI on CKD considered  Adjusted Protein Recommendations:   gm/day        Estimated Fluid Needs        Continue [x]  Adjust [] per LIP for CHF  Adjusted Fluid Recommendations:   mL/day     Nutrient Intake: ~50% PO at meals, not meeting estimated energy needs        [] Previous Nutrition Diagnosis: Increased Nutrient Needs...             [x] Ongoing          [] Resolved       Nutrition Intervention: meals and snacks, medical food supplement, vitamin and mineral supplement    Rec: Continue DASH/TLC, lactose restricted diet with Ensure compact BID, add MVI daily.      Goal/Expected Outcome: In 4 days pt. to consistently consume at elast 75% PO and supplement      Indicator/Monitoring: energy intake, body composition, NFPF, electrolyte/renal profile

## 2020-04-30 NOTE — PROGRESS NOTE ADULT - SUBJECTIVE AND OBJECTIVE BOX
SOB better but still sob on minimal exertion   diarrhea better   no chest pain     Vital Signs Last 24 Hrs  T(C): 36.1 (30 Apr 2020 05:40), Max: 36.6 (29 Apr 2020 20:57)  T(F): 97 (30 Apr 2020 05:40), Max: 97.8 (29 Apr 2020 20:57)  HR: 104 (30 Apr 2020 05:40) (85 - 104)  BP: 136/88 (30 Apr 2020 05:40) (123/63 - 136/88)  BP(mean): --  RR: 19 (30 Apr 2020 05:40) (19 - 20)  SpO2: 95% (29 Apr 2020 19:33) (95% - 95%)    PHYSICAL EXAM:  GENERAL: NAD,  Pulm: B/L basal crackles   CV: Regular rate and rhythm; No murmurs, rubs, or gallops  GI: Soft, Nontender, Nondistended; Bowel sounds present  EXTREMITIES:   edema  PSYCH: AAOx3  NEUROLOGY: non-focal                          13.5   10.80 )-----------( 206      ( 30 Apr 2020 05:16 )             43.5     04-30    138  |  94<L>  |  26<H>  ----------------------------<  112<H>  3.5   |  31  |  1.5    Ca    9.3      30 Apr 2020 05:16  Mg     1.8     04-30    TPro  5.9<L>  /  Alb  3.2<L>  /  TBili  0.7  /  DBili  x   /  AST  53<H>  /  ALT  44<H>  /  AlkPhos  228<H>  04-30    LIVER FUNCTIONS - ( 30 Apr 2020 05:16 )  Alb: 3.2 g/dL / Pro: 5.9 g/dL / ALK PHOS: 228 U/L / ALT: 44 U/L / AST: 53 U/L / GGT: x                      MEDICATIONS  (STANDING):  aspirin  chewable 81 milliGRAM(s) Oral daily  furosemide   Injectable 40 milliGRAM(s) IV Push <User Schedule>  metoprolol tartrate 100 milliGRAM(s) Oral two times a day  nystatin Powder 1 Application(s) Topical three times a day  pantoprazole    Tablet 40 milliGRAM(s) Oral two times a day  rivaroxaban 15 milliGRAM(s) Oral with dinner  vancomycin    Solution 125 milliGRAM(s) Oral every 6 hours    MEDICATIONS  (PRN):  acetaminophen   Tablet .. 650 milliGRAM(s) Oral every 4 hours PRN Temp greater or equal to 38.5C (101.3F)  ALBUTerol    90 MICROgram(s) HFA Inhaler 2 Puff(s) Inhalation every 4 hours PRN Shortness of Breath and/or Wheezing  ondansetron Injectable 4 milliGRAM(s) IV Push every 6 hours PRN Nausea and/or Vomiting

## 2020-04-30 NOTE — PROGRESS NOTE ADULT - ASSESSMENT
#acute on chronic diastolic chf: improving   chest xray still with effusions B/L   c/w  IV lasix 40 qday for now cr is 1.5   repeat chest xray tomorrow     #A-FIB   HR better controlled on lopressor 100 q12h   on xarelto 15 qday     #Diarrhea due to C-diff colitis:   c/w po vanco total of 14 days     #VANI on CKD stage 3 scr 1.5 stable monitor while on IV lasix     #HTN: controlled         #dvt ppx on xarelto.          Progress note handoff:   pending: CHF to improve   disposition: snf   discussion:  with patient

## 2020-04-30 NOTE — PROGRESS NOTE ADULT - ASSESSMENT
VANI   - Cr stable   - no hydro or stones on renal sono   - trace proteinuria + pyuria   baseline Cr 0.7 in 2018  acute on chronic HFpEF  right heart failure  EF 45% with mod MR, mod TR, mod pulm HTN, RV overload and R pleural effusion  Cdiff diarrhea  afib with RVR    plan:    cont lasix 40mg iv q24h  f/u cxray  monitor o2 sats   off ACE-I  monitor lytes and renal function  fluid restriction  daily wt  strict I/O  po vanco  contact isolation  physical therapy  jacob?  full code

## 2020-05-01 LAB
ALBUMIN SERPL ELPH-MCNC: 3.1 G/DL — LOW (ref 3.5–5.2)
ALP SERPL-CCNC: 180 U/L — HIGH (ref 30–115)
ALT FLD-CCNC: 39 U/L — SIGNIFICANT CHANGE UP (ref 0–41)
ANION GAP SERPL CALC-SCNC: 11 MMOL/L — SIGNIFICANT CHANGE UP (ref 7–14)
AST SERPL-CCNC: 44 U/L — HIGH (ref 0–41)
BASOPHILS # BLD AUTO: 0.03 K/UL — SIGNIFICANT CHANGE UP (ref 0–0.2)
BASOPHILS NFR BLD AUTO: 0.3 % — SIGNIFICANT CHANGE UP (ref 0–1)
BILIRUB SERPL-MCNC: 0.9 MG/DL — SIGNIFICANT CHANGE UP (ref 0.2–1.2)
BUN SERPL-MCNC: 22 MG/DL — HIGH (ref 10–20)
CALCIUM SERPL-MCNC: 8.5 MG/DL — SIGNIFICANT CHANGE UP (ref 8.5–10.1)
CHLORIDE SERPL-SCNC: 93 MMOL/L — LOW (ref 98–110)
CO2 SERPL-SCNC: 34 MMOL/L — HIGH (ref 17–32)
CREAT SERPL-MCNC: 1.4 MG/DL — SIGNIFICANT CHANGE UP (ref 0.7–1.5)
EOSINOPHIL # BLD AUTO: 0.17 K/UL — SIGNIFICANT CHANGE UP (ref 0–0.7)
EOSINOPHIL NFR BLD AUTO: 1.9 % — SIGNIFICANT CHANGE UP (ref 0–8)
GLUCOSE SERPL-MCNC: 90 MG/DL — SIGNIFICANT CHANGE UP (ref 70–99)
HCT VFR BLD CALC: 40.4 % — SIGNIFICANT CHANGE UP (ref 37–47)
HGB BLD-MCNC: 12.9 G/DL — SIGNIFICANT CHANGE UP (ref 12–16)
IMM GRANULOCYTES NFR BLD AUTO: 0.9 % — HIGH (ref 0.1–0.3)
LYMPHOCYTES # BLD AUTO: 0.92 K/UL — LOW (ref 1.2–3.4)
LYMPHOCYTES # BLD AUTO: 10 % — LOW (ref 20.5–51.1)
MAGNESIUM SERPL-MCNC: 1.9 MG/DL — SIGNIFICANT CHANGE UP (ref 1.8–2.4)
MCHC RBC-ENTMCNC: 26.8 PG — LOW (ref 27–31)
MCHC RBC-ENTMCNC: 31.9 G/DL — LOW (ref 32–37)
MCV RBC AUTO: 84 FL — SIGNIFICANT CHANGE UP (ref 81–99)
MONOCYTES # BLD AUTO: 1.02 K/UL — HIGH (ref 0.1–0.6)
MONOCYTES NFR BLD AUTO: 11.1 % — HIGH (ref 1.7–9.3)
NEUTROPHILS # BLD AUTO: 6.95 K/UL — HIGH (ref 1.4–6.5)
NEUTROPHILS NFR BLD AUTO: 75.8 % — HIGH (ref 42.2–75.2)
NRBC # BLD: 0 /100 WBCS — SIGNIFICANT CHANGE UP (ref 0–0)
PLATELET # BLD AUTO: 198 K/UL — SIGNIFICANT CHANGE UP (ref 130–400)
POTASSIUM SERPL-MCNC: 3.5 MMOL/L — SIGNIFICANT CHANGE UP (ref 3.5–5)
POTASSIUM SERPL-SCNC: 3.5 MMOL/L — SIGNIFICANT CHANGE UP (ref 3.5–5)
PROT SERPL-MCNC: 5.2 G/DL — LOW (ref 6–8)
RBC # BLD: 4.81 M/UL — SIGNIFICANT CHANGE UP (ref 4.2–5.4)
RBC # FLD: 18.8 % — HIGH (ref 11.5–14.5)
SODIUM SERPL-SCNC: 138 MMOL/L — SIGNIFICANT CHANGE UP (ref 135–146)
WBC # BLD: 9.17 K/UL — SIGNIFICANT CHANGE UP (ref 4.8–10.8)
WBC # FLD AUTO: 9.17 K/UL — SIGNIFICANT CHANGE UP (ref 4.8–10.8)

## 2020-05-01 PROCEDURE — 71045 X-RAY EXAM CHEST 1 VIEW: CPT | Mod: 26

## 2020-05-01 PROCEDURE — 99232 SBSQ HOSP IP/OBS MODERATE 35: CPT

## 2020-05-01 RX ADMIN — Medication 125 MILLIGRAM(S): at 11:44

## 2020-05-01 RX ADMIN — PANTOPRAZOLE SODIUM 40 MILLIGRAM(S): 20 TABLET, DELAYED RELEASE ORAL at 06:04

## 2020-05-01 RX ADMIN — Medication 100 MILLIGRAM(S): at 16:04

## 2020-05-01 RX ADMIN — Medication 81 MILLIGRAM(S): at 11:43

## 2020-05-01 RX ADMIN — PANTOPRAZOLE SODIUM 40 MILLIGRAM(S): 20 TABLET, DELAYED RELEASE ORAL at 16:04

## 2020-05-01 RX ADMIN — Medication 40 MILLIGRAM(S): at 11:43

## 2020-05-01 RX ADMIN — NYSTATIN CREAM 1 APPLICATION(S): 100000 CREAM TOPICAL at 11:44

## 2020-05-01 RX ADMIN — NYSTATIN CREAM 1 APPLICATION(S): 100000 CREAM TOPICAL at 21:44

## 2020-05-01 RX ADMIN — Medication 100 MILLIGRAM(S): at 06:04

## 2020-05-01 RX ADMIN — Medication 125 MILLIGRAM(S): at 16:04

## 2020-05-01 RX ADMIN — RIVAROXABAN 15 MILLIGRAM(S): KIT at 16:04

## 2020-05-01 RX ADMIN — Medication 125 MILLIGRAM(S): at 21:44

## 2020-05-01 RX ADMIN — Medication 125 MILLIGRAM(S): at 06:04

## 2020-05-01 RX ADMIN — NYSTATIN CREAM 1 APPLICATION(S): 100000 CREAM TOPICAL at 06:05

## 2020-05-01 NOTE — PROGRESS NOTE ADULT - ASSESSMENT
VANI   - Cr stable   - no hydro or stones on renal sono   - trace proteinuria + pyuria   baseline Cr 0.7 in 2018  acute on chronic HFpEF  right heart failure  EF 45% with mod MR, mod TR, mod pulm HTN, RV overload and R pleural effusion  Cdiff diarrhea  afib with RVR    plan:    consider change to lasix 40mg po bid  off ACE-I  monitor lytes and renal function  fluid restriction  daily wt  complete po vanco course  contact isolation  physical therapy  d/w resident

## 2020-05-01 NOTE — PROGRESS NOTE ADULT - SUBJECTIVE AND OBJECTIVE BOX
BRI AGUILAR 79y Female  MRN#: [id number]   CODE STATUS: FULL      SUBJECTIVE  Patient is a 79y old Female who presents with a chief complaint of shortness of breath (01 May 2020 14:13)    Currently admitted to medicine with the primary diagnosis of Fluid overload     Today is hospital day 6d, and this morning she is laying in bed comfortably and reports no overnight events. Feeling a bit better today. Breathing is easier and diarrhea is improving.    OBJECTIVE  PAST MEDICAL & SURGICAL HISTORY  History of stomach cancer: 25 years ago-- had stomach surgery  Hyperlipidemia  CAD S/P percutaneous coronary angioplasty  Atrial fibrillation  HTN (hypertension)  Diverticulitis  S/P small bowel resection  H/O abdominal hysterectomy    ALLERGIES:  penicillin (Anaphylaxis)  penicillin (Unknown)    MEDICATIONS:  STANDING MEDICATIONS  aspirin  chewable 81 milliGRAM(s) Oral daily  furosemide   Injectable 40 milliGRAM(s) IV Push <User Schedule>  metoprolol tartrate 100 milliGRAM(s) Oral two times a day  nystatin Powder 1 Application(s) Topical three times a day  pantoprazole    Tablet 40 milliGRAM(s) Oral two times a day  rivaroxaban 15 milliGRAM(s) Oral with dinner  vancomycin    Solution 125 milliGRAM(s) Oral every 6 hours    PRN MEDICATIONS  acetaminophen   Tablet .. 650 milliGRAM(s) Oral every 4 hours PRN  ALBUTerol    90 MICROgram(s) HFA Inhaler 2 Puff(s) Inhalation every 4 hours PRN  ondansetron Injectable 4 milliGRAM(s) IV Push every 6 hours PRN      VITAL SIGNS: Last 24 Hours  T(C): 36.6 (01 May 2020 12:33), Max: 36.6 (01 May 2020 12:33)  T(F): 97.8 (01 May 2020 12:33), Max: 97.8 (01 May 2020 12:33)  HR: 97 (01 May 2020 12:33) (84 - 105)  BP: 127/63 (01 May 2020 12:33) (120/62 - 138/63)  BP(mean): --  RR: 19 (01 May 2020 12:33) (19 - 19)  SpO2: 93% (01 May 2020 11:40) (91% - 96%)    LABS:                        12.9   9.17  )-----------( 198      ( 01 May 2020 04:58 )             40.4     05-01    138  |  93<L>  |  22<H>  ----------------------------<  90  3.5   |  34<H>  |  1.4    Ca    8.5      01 May 2020 04:58  Mg     1.9     05-01    TPro  5.2<L>  /  Alb  3.1<L>  /  TBili  0.9  /  DBili  x   /  AST  44<H>  /  ALT  39  /  AlkPhos  180<H>  05-01      RADIOLOGY:    < from: Xray Chest 1 View- PORTABLE-Routine (05.01.20 @ 07:35) >  Impression:      Stable right basilar opacity/effusion.    < end of copied text >      < from: US Retroperitoneal Complete (04.29.20 @ 20:32) >  IMPRESSION:    No obstructing renal calculus or hydronephrosis.    Post void bladder volume of 42 cc.    < end of copied text >      < from: Xray Chest 1 View- PORTABLE-Routine (04.29.20 @ 08:27) >  Impression:      Cardiomegaly. Improving bilateral right greater than left opacity/effusions.    < end of copied text >      < from: Xray Chest 1 View- PORTABLE-Urgent (04.27.20 @ 20:56) >  Impression:      Cardiomegaly. Bilateral opacifications and effusions, worsening.    < end of copied text >      < from: [facility] Duplex Lower Ext Vein Scan, Bilat (04.27.20 @ 12:01) >  Impression:    No evidence of deep venous thrombosis in the bilateral lower extremities.    < end of copied text >      < from: Xray Chest 1 View-PORTABLE IMMEDIATE (04.25.20 @ 08:06) >  IMPRESSION:      New right basilar opacity/effusion. Follow-up after treatment recommended to demonstrate resolution.    < end of copied text >      PHYSICAL EXAM:    GENERAL: NAD, well-developed, AAOx3  HEENT:  Atraumatic, Normocephalic. EOMI, conjunctiva and sclera clear, No JVD  PULMONARY: bibasilar crackles  CARDIOVASCULAR: Regular rate and rhythm; No murmurs, rubs, or gallops  GASTROINTESTINAL: Soft, Nontender, Nondistended; Bowel sounds present  MUSCULOSKELETAL: no cyanosis. 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    Acute hypoxic respiratory failure likely fluid overload with likely acute on chronic HFrEF exacerbation / right sided heart failure with mod. pulm. HTN- COVID neg  - c/w NC O2 to maintain SPO2 92-94%  - continue on lasix 40mg IV  - Monitor I&O's/ daily weights/ monitor electrolytes/ maintain K>4/ mag >2  - fluid restriction to 1.5L/day  - c/w lopressor/ xarelto.   - albuterol prn for sob.  - TTE noted: EF of 45-50% with mildly dec LVSF/ RV volume and pressure overload/ mod. MR/TR/mod pleural effusion/mod. pulm. HTN  - Cardio consult appreciated- recs followed.   - Bilateral LE doppler neg for dvt.   - nephro consult appreciated  - am cxr unchanged as above- f/u AM CXR, consider pulm consult if effusion doesn't improve    # A-FIB with RVR- remains stable ~   - switched to metoprolol 100 BID 4/29 PM  - c/w lopressor/ xarelto.     # Diarrhea due to C-diff colitis:   - started on oral vanco 4/27  - contact isolation    # VANI on CKD stage 3 with HAGMA- pre-renal with diuresis and diarrhea:   - on lasix 40mg IV  - monitor I&O's strictly- jacob placed  - nephro consult appreciated    # HTN- stable   - switched to metoprolol 100 BID 4/29 PM      DVT ppx: on xarelto  GI ppx: protonix  Code Status: FULL CODE

## 2020-05-01 NOTE — PROGRESS NOTE ADULT - SUBJECTIVE AND OBJECTIVE BOX
NEPHROLOGY FOLLOW UP NOTE    pt seen and examined  d/w team  cr improving  still on iv lasix  less diarrhea    PAST MEDICAL & SURGICAL HISTORY:  History of stomach cancer: 25 years ago-- had stomach surgery  Hyperlipidemia  CAD S/P percutaneous coronary angioplasty  Atrial fibrillation  HTN (hypertension)  Diverticulitis  S/P small bowel resection  H/O abdominal hysterectomy    Allergies:  penicillin (Anaphylaxis)  penicillin (Unknown)    Home Medications Reviewed    SOCIAL HISTORY:  Denies ETOH,Smoking,   FAMILY HISTORY:  No pertinent family history in first degree relatives        REVIEW OF SYSTEMS:  CONSTITUTIONAL: + weakness, no fevers or chills  EYES/ENT: No visual changes;  No vertigo or throat pain   NECK: No pain or stiffness  RESPIRATORY: No cough, wheezing, hemoptysis; No shortness of breath  CARDIOVASCULAR: No chest pain or palpitations.  GASTROINTESTINAL: No abdominal or epigastric pain. No nausea, vomiting, or hematemesis; No diarrhea or constipation. No melena or hematochezia.  GENITOURINARY: No dysuria, frequency, foamy urine, urinary urgency, incontinence or hematuria  NEUROLOGICAL: No numbness or weakness  SKIN: No itching, burning, rashes, or lesions   VASCULAR: No bilateral lower extremity edema.   All other review of systems is negative unless indicated above.    PHYSICAL EXAM:  Constitutional: NAD  HEENT: anicteric sclera, oropharynx clear, MMM  Neck: No JVD  Respiratory: CTAB, no wheezes, rales or rhonchi  Cardiovascular: S1, S2, RRR  Gastrointestinal: BS+, soft, NT/ND  Extremities: No cyanosis or clubbing. No peripheral edema  Neurological: A/O x 3, no focal deficits  Psychiatric: Normal mood, normal affect  : No CVA tenderness. No jacob.   Skin: No rashes      Hospital Medications:   MEDICATIONS  (STANDING):  aspirin  chewable 81 milliGRAM(s) Oral daily  furosemide   Injectable 40 milliGRAM(s) IV Push <User Schedule>  metoprolol tartrate 100 milliGRAM(s) Oral two times a day  nystatin Powder 1 Application(s) Topical three times a day  pantoprazole    Tablet 40 milliGRAM(s) Oral two times a day  rivaroxaban 15 milliGRAM(s) Oral with dinner  vancomycin    Solution 125 milliGRAM(s) Oral every 6 hours        VITALS:  T(F): 97.8 (20 @ 12:33), Max: 97.8 (20 @ 12:33)  HR: 97 (20 @ 12:33)  BP: 127/63 (20 @ 12:33)  RR: 19 (20 @ 12:33)  SpO2: 93% (20 @ 11:40)  Wt(kg): --     @ 07:01  -   @ 07:00  --------------------------------------------------------  IN: 620 mL / OUT: 2 mL / NET: 618 mL     @ 07:01  -   @ 07:00  --------------------------------------------------------  IN: 555 mL / OUT: 1600 mL / NET: -1045 mL     @ 07:01  -   @ 14:14  --------------------------------------------------------  IN: 200 mL / OUT: 0 mL / NET: 200 mL          LABS:      138  |  93<L>  |  22<H>  ----------------------------<  90  3.5   |  34<H>  |  1.4    Ca    8.5      01 May 2020 04:58  Mg     1.9         TPro  5.2<L>  /  Alb  3.1<L>  /  TBili  0.9  /  DBili      /  AST  44<H>  /  ALT  39  /  AlkPhos  180<H>                            12.9   9.17  )-----------( 198      ( 01 May 2020 04:58 )             40.4       Urine Studies:  Urinalysis Basic - ( 2020 13:47 )    Color: Light Yellow / Appearance: Slightly Turbid / S.008 / pH:   Gluc:  / Ketone: Negative  / Bili: Negative / Urobili: <2 mg/dL   Blood:  / Protein: Trace / Nitrite: Negative   Leuk Esterase: Large / RBC: 1 /HPF /  /HPF   Sq Epi:  / Non Sq Epi: 0 /HPF / Bacteria: Negative      Protein/Creatinine Ratio Calculation: 0.8 Ratio ( @ 13:47)  Creatinine, Random Urine: 19 mg/dL ( @ 13:47)      RADIOLOGY & ADDITIONAL STUDIES:

## 2020-05-01 NOTE — PROGRESS NOTE ADULT - ASSESSMENT
#acute on chronic diastolic chf: improving   chest xray still with effusions B/L   c/w  IV lasix 40 qday   repeat chest xray tomorrow AM    #A-FIB   HR better controlled on lopressor 100 q12h   on xarelto 15 qday     #Diarrhea due to C-diff colitis: improving   c/w po vanco total of 14 days     #VANI on CKD stage 3 - stable monitor while on IV lasix     #HTN: controlled         #dvt ppx on xarelto.          Progress note handoff:   pending: CHF to improve   disposition: snf   discussion:  with patient

## 2020-05-01 NOTE — PROGRESS NOTE ADULT - SUBJECTIVE AND OBJECTIVE BOX
no diarrhea  no chest pain  no SOB sitting in chair   Vital Signs Last 24 Hrs  T(C): 36.6 (01 May 2020 12:33), Max: 36.6 (01 May 2020 12:33)  T(F): 97.8 (01 May 2020 12:33), Max: 97.8 (01 May 2020 12:33)  HR: 97 (01 May 2020 12:33) (84 - 105)  BP: 127/63 (01 May 2020 12:33) (120/62 - 138/63)  BP(mean): --  RR: 19 (01 May 2020 12:33) (19 - 19)  SpO2: 93% (01 May 2020 11:40) (91% - 96%)      PHYSICAL EXAM:  GENERAL: NAD  Pulm: dec BS at bases no wheeze, rhonchi  CV: Regular rate and rhythm; No murmurs, rubs, or gallops  GI: Soft, Nontender, Nondistended; Bowel sounds present  EXTREMITIES:   edema+  PSYCH: AAOx3  NEUROLOGY: non-focal                               12.9   9.17  )-----------( 198      ( 01 May 2020 04:58 )             40.4   05-01    138  |  93<L>  |  22<H>  ----------------------------<  90  3.5   |  34<H>  |  1.4      Creatinine: 1.4 (05-01 @ 04:58)    Creatinine: 1.5 (04-30 @ 05:16)    Creatinine: 1.5 (04-29 @ 05:40)    Creatinine: 1.7 (04-28 @ 16:28)    Creatinine: 1.7 (04-28 @ 07:13)    Creatinine: 1.5 (04-27 @ 16:54)        Ca    8.5      01 May 2020 04:58  Mg     1.9     05-01    TPro  5.2<L>  /  Alb  3.1<L>  /  TBili  0.9  /  DBili  x   /  AST  44<H>  /  ALT  39  /  AlkPhos  180<H>  05-01               MEDICATIONS  (STANDING):  aspirin  chewable 81 milliGRAM(s) Oral daily  furosemide   Injectable 40 milliGRAM(s) IV Push <User Schedule>  metoprolol tartrate 100 milliGRAM(s) Oral two times a day  nystatin Powder 1 Application(s) Topical three times a day  pantoprazole    Tablet 40 milliGRAM(s) Oral two times a day  rivaroxaban 15 milliGRAM(s) Oral with dinner  vancomycin    Solution 125 milliGRAM(s) Oral every 6 hours    MEDICATIONS  (PRN):  acetaminophen   Tablet .. 650 milliGRAM(s) Oral every 4 hours PRN Temp greater or equal to 38.5C (101.3F)  ALBUTerol    90 MICROgram(s) HFA Inhaler 2 Puff(s) Inhalation every 4 hours PRN Shortness of Breath and/or Wheezing  ondansetron Injectable 4 milliGRAM(s) IV Push every 6 hours PRN Nausea and/or Vomiting

## 2020-05-02 LAB
ALBUMIN SERPL ELPH-MCNC: 3 G/DL — LOW (ref 3.5–5.2)
ALP SERPL-CCNC: 189 U/L — HIGH (ref 30–115)
ALT FLD-CCNC: 35 U/L — SIGNIFICANT CHANGE UP (ref 0–41)
ANION GAP SERPL CALC-SCNC: 12 MMOL/L — SIGNIFICANT CHANGE UP (ref 7–14)
AST SERPL-CCNC: 33 U/L — SIGNIFICANT CHANGE UP (ref 0–41)
BASOPHILS # BLD AUTO: 0.03 K/UL — SIGNIFICANT CHANGE UP (ref 0–0.2)
BASOPHILS NFR BLD AUTO: 0.4 % — SIGNIFICANT CHANGE UP (ref 0–1)
BILIRUB SERPL-MCNC: 0.9 MG/DL — SIGNIFICANT CHANGE UP (ref 0.2–1.2)
BUN SERPL-MCNC: 19 MG/DL — SIGNIFICANT CHANGE UP (ref 10–20)
CALCIUM SERPL-MCNC: 8.8 MG/DL — SIGNIFICANT CHANGE UP (ref 8.5–10.1)
CHLORIDE SERPL-SCNC: 93 MMOL/L — LOW (ref 98–110)
CO2 SERPL-SCNC: 36 MMOL/L — HIGH (ref 17–32)
CREAT SERPL-MCNC: 1.4 MG/DL — SIGNIFICANT CHANGE UP (ref 0.7–1.5)
EOSINOPHIL # BLD AUTO: 0.13 K/UL — SIGNIFICANT CHANGE UP (ref 0–0.7)
EOSINOPHIL NFR BLD AUTO: 1.6 % — SIGNIFICANT CHANGE UP (ref 0–8)
GLUCOSE SERPL-MCNC: 100 MG/DL — HIGH (ref 70–99)
HCT VFR BLD CALC: 40.9 % — SIGNIFICANT CHANGE UP (ref 37–47)
HGB BLD-MCNC: 13.2 G/DL — SIGNIFICANT CHANGE UP (ref 12–16)
IMM GRANULOCYTES NFR BLD AUTO: 0.9 % — HIGH (ref 0.1–0.3)
LYMPHOCYTES # BLD AUTO: 1.22 K/UL — SIGNIFICANT CHANGE UP (ref 1.2–3.4)
LYMPHOCYTES # BLD AUTO: 15.4 % — LOW (ref 20.5–51.1)
MAGNESIUM SERPL-MCNC: 1.8 MG/DL — SIGNIFICANT CHANGE UP (ref 1.8–2.4)
MCHC RBC-ENTMCNC: 27.2 PG — SIGNIFICANT CHANGE UP (ref 27–31)
MCHC RBC-ENTMCNC: 32.3 G/DL — SIGNIFICANT CHANGE UP (ref 32–37)
MCV RBC AUTO: 84.3 FL — SIGNIFICANT CHANGE UP (ref 81–99)
MONOCYTES # BLD AUTO: 0.98 K/UL — HIGH (ref 0.1–0.6)
MONOCYTES NFR BLD AUTO: 12.4 % — HIGH (ref 1.7–9.3)
NEUTROPHILS # BLD AUTO: 5.48 K/UL — SIGNIFICANT CHANGE UP (ref 1.4–6.5)
NEUTROPHILS NFR BLD AUTO: 69.3 % — SIGNIFICANT CHANGE UP (ref 42.2–75.2)
NRBC # BLD: 0 /100 WBCS — SIGNIFICANT CHANGE UP (ref 0–0)
PLATELET # BLD AUTO: 212 K/UL — SIGNIFICANT CHANGE UP (ref 130–400)
POTASSIUM SERPL-MCNC: 3.6 MMOL/L — SIGNIFICANT CHANGE UP (ref 3.5–5)
POTASSIUM SERPL-SCNC: 3.6 MMOL/L — SIGNIFICANT CHANGE UP (ref 3.5–5)
PROT SERPL-MCNC: 5.4 G/DL — LOW (ref 6–8)
RBC # BLD: 4.85 M/UL — SIGNIFICANT CHANGE UP (ref 4.2–5.4)
RBC # FLD: 19.3 % — HIGH (ref 11.5–14.5)
SODIUM SERPL-SCNC: 141 MMOL/L — SIGNIFICANT CHANGE UP (ref 135–146)
WBC # BLD: 7.91 K/UL — SIGNIFICANT CHANGE UP (ref 4.8–10.8)
WBC # FLD AUTO: 7.91 K/UL — SIGNIFICANT CHANGE UP (ref 4.8–10.8)

## 2020-05-02 PROCEDURE — 71045 X-RAY EXAM CHEST 1 VIEW: CPT | Mod: 26

## 2020-05-02 PROCEDURE — 99233 SBSQ HOSP IP/OBS HIGH 50: CPT

## 2020-05-02 RX ORDER — FUROSEMIDE 40 MG
40 TABLET ORAL ONCE
Refills: 0 | Status: COMPLETED | OUTPATIENT
Start: 2020-05-02 | End: 2020-05-02

## 2020-05-02 RX ORDER — ENOXAPARIN SODIUM 100 MG/ML
40 INJECTION SUBCUTANEOUS DAILY
Refills: 0 | Status: DISCONTINUED | OUTPATIENT
Start: 2020-05-02 | End: 2020-05-04

## 2020-05-02 RX ADMIN — ENOXAPARIN SODIUM 40 MILLIGRAM(S): 100 INJECTION SUBCUTANEOUS at 12:22

## 2020-05-02 RX ADMIN — PANTOPRAZOLE SODIUM 40 MILLIGRAM(S): 20 TABLET, DELAYED RELEASE ORAL at 05:42

## 2020-05-02 RX ADMIN — NYSTATIN CREAM 1 APPLICATION(S): 100000 CREAM TOPICAL at 18:27

## 2020-05-02 RX ADMIN — Medication 100 MILLIGRAM(S): at 05:42

## 2020-05-02 RX ADMIN — NYSTATIN CREAM 1 APPLICATION(S): 100000 CREAM TOPICAL at 05:43

## 2020-05-02 RX ADMIN — Medication 125 MILLIGRAM(S): at 12:21

## 2020-05-02 RX ADMIN — Medication 81 MILLIGRAM(S): at 12:21

## 2020-05-02 RX ADMIN — Medication 100 MILLIGRAM(S): at 18:50

## 2020-05-02 RX ADMIN — Medication 40 MILLIGRAM(S): at 12:22

## 2020-05-02 RX ADMIN — PANTOPRAZOLE SODIUM 40 MILLIGRAM(S): 20 TABLET, DELAYED RELEASE ORAL at 18:51

## 2020-05-02 RX ADMIN — Medication 40 MILLIGRAM(S): at 18:50

## 2020-05-02 RX ADMIN — NYSTATIN CREAM 1 APPLICATION(S): 100000 CREAM TOPICAL at 21:31

## 2020-05-02 RX ADMIN — Medication 125 MILLIGRAM(S): at 18:51

## 2020-05-02 RX ADMIN — Medication 125 MILLIGRAM(S): at 05:42

## 2020-05-02 NOTE — PROGRESS NOTE ADULT - SUBJECTIVE AND OBJECTIVE BOX
NEPHROLOGY FOLLOW UP NOTE    pt seen and examined  d/w team  cr stable  still on iv lasix  no diarrhea   still with r effusion on cxray    PAST MEDICAL & SURGICAL HISTORY:  History of stomach cancer: 25 years ago-- had stomach surgery  Hyperlipidemia  CAD S/P percutaneous coronary angioplasty  Atrial fibrillation  HTN (hypertension)  Diverticulitis  S/P small bowel resection  H/O abdominal hysterectomy    Allergies:  penicillin (Anaphylaxis)  penicillin (Unknown)    Home Medications Reviewed    SOCIAL HISTORY:  Denies ETOH,Smoking,   FAMILY HISTORY:  No pertinent family history in first degree relatives        REVIEW OF SYSTEMS:  CONSTITUTIONAL: + weakness, no fevers or chills  EYES/ENT: No visual changes;  No vertigo or throat pain   NECK: No pain or stiffness  RESPIRATORY: No cough, wheezing, hemoptysis; No shortness of breath  CARDIOVASCULAR: No chest pain or palpitations.  GASTROINTESTINAL: No abdominal or epigastric pain. No nausea, vomiting, or hematemesis; No diarrhea or constipation. No melena or hematochezia.  GENITOURINARY: No dysuria, frequency, foamy urine, urinary urgency, incontinence or hematuria  NEUROLOGICAL: No numbness or weakness  SKIN: No itching, burning, rashes, or lesions   VASCULAR: No bilateral lower extremity edema.   All other review of systems is negative unless indicated above.    PHYSICAL EXAM:  Constitutional: NAD  HEENT: anicteric sclera, oropharynx clear, MMM  Neck: No JVD  Respiratory: CTAB, no wheezes, rales or rhonchi  Cardiovascular: S1, S2, RRR  Gastrointestinal: BS+, soft, NT/ND  Extremities: No cyanosis or clubbing. No peripheral edema  Neurological: A/O x 3, no focal deficits  Psychiatric: Normal mood, normal affect  : No CVA tenderness. No jacob.   Skin: No rashes    Hospital Medications:   MEDICATIONS  (STANDING):  aspirin  chewable 81 milliGRAM(s) Oral daily  enoxaparin Injectable 40 milliGRAM(s) SubCutaneous daily  furosemide   Injectable 40 milliGRAM(s) IV Push <User Schedule>  furosemide   Injectable 40 milliGRAM(s) IV Push once  metoprolol tartrate 100 milliGRAM(s) Oral two times a day  nystatin Powder 1 Application(s) Topical three times a day  pantoprazole    Tablet 40 milliGRAM(s) Oral two times a day  vancomycin    Solution 125 milliGRAM(s) Oral every 6 hours        VITALS:  T(F): 97 (20 @ 11:49), Max: 98 (20 @ 05:09)  HR: 84 (20 @ 11:49)  BP: 105/66 (20 @ 11:49)  RR: 18 (20 @ 11:49)  SpO2: 93% (20 @ 07:41)  Wt(kg): --     @ 07:01  -   @ 07:00  --------------------------------------------------------  IN: 555 mL / OUT: 1600 mL / NET: -1045 mL     @ 07:01  -   @ 07:00  --------------------------------------------------------  IN: 845 mL / OUT: 1325 mL / NET: -480 mL     @ 07:01  -   @ 14:46  --------------------------------------------------------  IN: 0 mL / OUT: 550 mL / NET: -550 mL          LABS:      141  |  93<L>  |  19  ----------------------------<  100<H>  3.6   |  36<H>  |  1.4    Ca    8.8      02 May 2020 04:30  Mg     1.8         TPro  5.4<L>  /  Alb  3.0<L>  /  TBili  0.9  /  DBili      /  AST  33  /  ALT  35  /  AlkPhos  189<H>                            13.2   7.91  )-----------( 212      ( 02 May 2020 04:30 )             40.9       Urine Studies:  Urinalysis Basic - ( 2020 13:47 )    Color: Light Yellow / Appearance: Slightly Turbid / S.008 / pH:   Gluc:  / Ketone: Negative  / Bili: Negative / Urobili: <2 mg/dL   Blood:  / Protein: Trace / Nitrite: Negative   Leuk Esterase: Large / RBC: 1 /HPF /  /HPF   Sq Epi:  / Non Sq Epi: 0 /HPF / Bacteria: Negative      Protein/Creatinine Ratio Calculation: 0.8 Ratio ( @ 13:47)  Creatinine, Random Urine: 19 mg/dL ( @ 13:47)      RADIOLOGY & ADDITIONAL STUDIES:

## 2020-05-02 NOTE — PROGRESS NOTE ADULT - ASSESSMENT
#acute on chronic diastolic chf: improving   chest xray still with effusions right side - not improved after diuresis - will call pulmonary for thoracentesis  c/w  IV lasix 40 qday   repeat chest xray tomorrow AM    #A-FIB   HR better controlled on lopressor 100 q12h   HOLD  xarelto 15 qday for potential thoracenteses     #Diarrhea due to C-diff colitis: monitor electrolytes   c/w po vanco total of 14 days     #VANI on CKD stage 3 - stable monitor while on IV lasix     #HTN: controlled         #dvt ppx - sq heparin while xarelto on hold          Progress note handoff:   pending: CHF to improve , pulmonary, possible thora  disposition: snf   discussion:  with patient

## 2020-05-02 NOTE — PROGRESS NOTE ADULT - SUBJECTIVE AND OBJECTIVE BOX
+  diarrhea today  no chest pain  no SOB sitting in chair   Vital Signs Last 24 Hrs  T(C): 36.7 (02 May 2020 05:09), Max: 36.7 (02 May 2020 05:09)  T(F): 98 (02 May 2020 05:09), Max: 98 (02 May 2020 05:09)  HR: 120 (02 May 2020 05:09) (93 - 120)  BP: 122/77 (02 May 2020 05:09) (111/60 - 127/63)  BP(mean): --  RR: 19 (02 May 2020 05:09) (18 - 19)  SpO2: 93% (02 May 2020 07:41) (92% - 93%)    PHYSICAL EXAM:  GENERAL: NAD  Pulm: dec BS at bases no wheeze, rhonchi  CV: Regular rate and rhythm; No murmurs, rubs, or gallops  GI: Soft, Nontender, Nondistended; Bowel sounds present  EXTREMITIES:   edema+  PSYCH: AAOx3  NEUROLOGY: non-focal             05-02    141  |  93<L>  |  19  ----------------------------<  100<H>  3.6   |  36<H>  |  1.4      Creatinine: 1.4 (05-02 @ 04:30)    Creatinine: 1.4 (05-01 @ 04:58)    Creatinine: 1.5 (04-30 @ 05:16)    Creatinine: 1.5 (04-29 @ 05:40)    Creatinine: 1.7 (04-28 @ 16:28)    Creatinine: 1.7 (04-28 @ 07:13)    Creatinine: 1.5 (04-27 @ 16:54)        Ca    8.8      02 May 2020 04:30  Mg     1.8     05-02    TPro  5.4<L>  /  Alb  3.0<L>  /  TBili  0.9  /  DBili  x   /  AST  33  /  ALT  35  /  AlkPhos  189<H>  05-02                          13.2   7.91  )-----------( 212      ( 02 May 2020 04:30 )             40.9                    MEDICATIONS  (STANDING):  aspirin  chewable 81 milliGRAM(s) Oral daily  furosemide   Injectable 40 milliGRAM(s) IV Push <User Schedule>  metoprolol tartrate 100 milliGRAM(s) Oral two times a day  nystatin Powder 1 Application(s) Topical three times a day  pantoprazole    Tablet 40 milliGRAM(s) Oral two times a day  rivaroxaban 15 milliGRAM(s) Oral with dinner  vancomycin    Solution 125 milliGRAM(s) Oral every 6 hours    MEDICATIONS  (PRN):  acetaminophen   Tablet .. 650 milliGRAM(s) Oral every 4 hours PRN Temp greater or equal to 38.5C (101.3F)  ALBUTerol    90 MICROgram(s) HFA Inhaler 2 Puff(s) Inhalation every 4 hours PRN Shortness of Breath and/or Wheezing  ondansetron Injectable 4 milliGRAM(s) IV Push every 6 hours PRN Nausea and/or Vomiting

## 2020-05-02 NOTE — PROGRESS NOTE ADULT - ASSESSMENT
VANI   - Cr stable   - no hydro or stones on renal sono   - trace proteinuria + pyuria   baseline Cr 0.7 in 2018  acute on chronic HFpEF  right heart failure  EF 45% with mod MR, mod TR, mod pulm HTN, RV overload and R pleural effusion  Cdiff diarrhea  afib with RVR    plan:    cont lasix 40mg iv q24h   give extra lasix 40mg iv x 1 tonight  f/u pulm re thoracentesis  off ACE-I  monitor lytes and renal function  fluid restriction  daily wt  complete po vanco course  contact isolation  physical therapy

## 2020-05-03 LAB
ALBUMIN SERPL ELPH-MCNC: 2.9 G/DL — LOW (ref 3.5–5.2)
ALP SERPL-CCNC: 177 U/L — HIGH (ref 30–115)
ALT FLD-CCNC: 29 U/L — SIGNIFICANT CHANGE UP (ref 0–41)
ANION GAP SERPL CALC-SCNC: 14 MMOL/L — SIGNIFICANT CHANGE UP (ref 7–14)
AST SERPL-CCNC: 27 U/L — SIGNIFICANT CHANGE UP (ref 0–41)
BASOPHILS # BLD AUTO: 0.03 K/UL — SIGNIFICANT CHANGE UP (ref 0–0.2)
BASOPHILS NFR BLD AUTO: 0.4 % — SIGNIFICANT CHANGE UP (ref 0–1)
BILIRUB SERPL-MCNC: 1 MG/DL — SIGNIFICANT CHANGE UP (ref 0.2–1.2)
BUN SERPL-MCNC: 15 MG/DL — SIGNIFICANT CHANGE UP (ref 10–20)
CALCIUM SERPL-MCNC: 8.7 MG/DL — SIGNIFICANT CHANGE UP (ref 8.5–10.1)
CHLORIDE SERPL-SCNC: 90 MMOL/L — LOW (ref 98–110)
CO2 SERPL-SCNC: 35 MMOL/L — HIGH (ref 17–32)
CREAT SERPL-MCNC: 1.4 MG/DL — SIGNIFICANT CHANGE UP (ref 0.7–1.5)
EOSINOPHIL # BLD AUTO: 0.13 K/UL — SIGNIFICANT CHANGE UP (ref 0–0.7)
EOSINOPHIL NFR BLD AUTO: 1.9 % — SIGNIFICANT CHANGE UP (ref 0–8)
GLUCOSE SERPL-MCNC: 102 MG/DL — HIGH (ref 70–99)
HCT VFR BLD CALC: 40.2 % — SIGNIFICANT CHANGE UP (ref 37–47)
HGB BLD-MCNC: 13 G/DL — SIGNIFICANT CHANGE UP (ref 12–16)
IMM GRANULOCYTES NFR BLD AUTO: 0.4 % — HIGH (ref 0.1–0.3)
LYMPHOCYTES # BLD AUTO: 0.92 K/UL — LOW (ref 1.2–3.4)
LYMPHOCYTES # BLD AUTO: 13.2 % — LOW (ref 20.5–51.1)
MAGNESIUM SERPL-MCNC: 1.8 MG/DL — SIGNIFICANT CHANGE UP (ref 1.8–2.4)
MCHC RBC-ENTMCNC: 27 PG — SIGNIFICANT CHANGE UP (ref 27–31)
MCHC RBC-ENTMCNC: 32.3 G/DL — SIGNIFICANT CHANGE UP (ref 32–37)
MCV RBC AUTO: 83.4 FL — SIGNIFICANT CHANGE UP (ref 81–99)
MONOCYTES # BLD AUTO: 0.74 K/UL — HIGH (ref 0.1–0.6)
MONOCYTES NFR BLD AUTO: 10.6 % — HIGH (ref 1.7–9.3)
NEUTROPHILS # BLD AUTO: 5.12 K/UL — SIGNIFICANT CHANGE UP (ref 1.4–6.5)
NEUTROPHILS NFR BLD AUTO: 73.5 % — SIGNIFICANT CHANGE UP (ref 42.2–75.2)
NRBC # BLD: 0 /100 WBCS — SIGNIFICANT CHANGE UP (ref 0–0)
PLATELET # BLD AUTO: 225 K/UL — SIGNIFICANT CHANGE UP (ref 130–400)
POTASSIUM SERPL-MCNC: 3.5 MMOL/L — SIGNIFICANT CHANGE UP (ref 3.5–5)
POTASSIUM SERPL-SCNC: 3.5 MMOL/L — SIGNIFICANT CHANGE UP (ref 3.5–5)
PROT SERPL-MCNC: 5.5 G/DL — LOW (ref 6–8)
RBC # BLD: 4.82 M/UL — SIGNIFICANT CHANGE UP (ref 4.2–5.4)
RBC # FLD: 19.1 % — HIGH (ref 11.5–14.5)
SODIUM SERPL-SCNC: 139 MMOL/L — SIGNIFICANT CHANGE UP (ref 135–146)
WBC # BLD: 6.97 K/UL — SIGNIFICANT CHANGE UP (ref 4.8–10.8)
WBC # FLD AUTO: 6.97 K/UL — SIGNIFICANT CHANGE UP (ref 4.8–10.8)

## 2020-05-03 PROCEDURE — 99233 SBSQ HOSP IP/OBS HIGH 50: CPT

## 2020-05-03 RX ORDER — FUROSEMIDE 40 MG
40 TABLET ORAL ONCE
Refills: 0 | Status: COMPLETED | OUTPATIENT
Start: 2020-05-03 | End: 2020-05-03

## 2020-05-03 RX ORDER — FUROSEMIDE 40 MG
20 TABLET ORAL DAILY
Refills: 0 | Status: DISCONTINUED | OUTPATIENT
Start: 2020-05-04 | End: 2020-05-04

## 2020-05-03 RX ADMIN — Medication 100 MILLIGRAM(S): at 19:14

## 2020-05-03 RX ADMIN — NYSTATIN CREAM 1 APPLICATION(S): 100000 CREAM TOPICAL at 05:48

## 2020-05-03 RX ADMIN — Medication 125 MILLIGRAM(S): at 23:41

## 2020-05-03 RX ADMIN — Medication 40 MILLIGRAM(S): at 19:14

## 2020-05-03 RX ADMIN — Medication 81 MILLIGRAM(S): at 13:52

## 2020-05-03 RX ADMIN — Medication 100 MILLIGRAM(S): at 05:48

## 2020-05-03 RX ADMIN — Medication 125 MILLIGRAM(S): at 13:52

## 2020-05-03 RX ADMIN — Medication 40 MILLIGRAM(S): at 13:53

## 2020-05-03 RX ADMIN — NYSTATIN CREAM 1 APPLICATION(S): 100000 CREAM TOPICAL at 23:42

## 2020-05-03 RX ADMIN — NYSTATIN CREAM 1 APPLICATION(S): 100000 CREAM TOPICAL at 15:34

## 2020-05-03 RX ADMIN — PANTOPRAZOLE SODIUM 40 MILLIGRAM(S): 20 TABLET, DELAYED RELEASE ORAL at 19:15

## 2020-05-03 RX ADMIN — Medication 125 MILLIGRAM(S): at 05:48

## 2020-05-03 RX ADMIN — Medication 125 MILLIGRAM(S): at 00:31

## 2020-05-03 RX ADMIN — PANTOPRAZOLE SODIUM 40 MILLIGRAM(S): 20 TABLET, DELAYED RELEASE ORAL at 05:48

## 2020-05-03 RX ADMIN — Medication 125 MILLIGRAM(S): at 19:13

## 2020-05-03 RX ADMIN — ENOXAPARIN SODIUM 40 MILLIGRAM(S): 100 INJECTION SUBCUTANEOUS at 13:52

## 2020-05-03 NOTE — CONSULT NOTE ADULT - ASSESSMENT
ASSESSMENT:    HFrEF with exacerbation  pHTN  Afib with RVR now rate controlled    RECOMMENDATIONS:    Continue diuresis, improving clinically with diuresis  Check ABG if ph>7.5 consider acetazolamide  Maintain SpO2> 92%, wean oxygen as tolerated  DVT ppx  OOB to chair  Will follow

## 2020-05-03 NOTE — PROGRESS NOTE ADULT - SUBJECTIVE AND OBJECTIVE BOX
+  diarrhea today  no chest pain  no SOB sitting in chair     Vital Signs Last 24 Hrs  T(C): 36 (02 May 2020 14:56), Max: 36.1 (02 May 2020 11:49)  T(F): 96.8 (02 May 2020 14:56), Max: 97 (02 May 2020 11:49)  HR: 86 (03 May 2020 05:54) (84 - 100)  BP: 132/58 (03 May 2020 05:54) (105/66 - 132/58)  BP(mean): --  RR: 18 (03 May 2020 05:54) (18 - 18)  SpO2: 96% (03 May 2020 05:54) (92% - 96%)      PHYSICAL EXAM:  GENERAL: NAD  Pulm: dec BS at bases no wheeze, rhonchi  CV: Regular rate and rhythm; No murmurs, rubs, or gallops  GI: Soft, Nontender, Nondistended; Bowel sounds present  EXTREMITIES:   edema+  PSYCH: AAOx3  NEUROLOGY: non-focal                             13.0   6.97  )-----------( 225      ( 03 May 2020 07:49 )             40.2   05-03    139  |  90<L>  |  15  ----------------------------<  102<H>  3.5   |  35<H>  |  1.4    Ca    8.7      03 May 2020 07:49  Mg     1.8     05-03    TPro  5.5<L>  /  Alb  2.9<L>  /  TBili  1.0  /  DBili  x   /  AST  27  /  ALT  29  /  AlkPhos  177<H>  05-03    WBC Count: 6.97 (05-03 @ 07:49)  WBC Count: 7.91 (05-02 @ 04:30)  WBC Count: 9.17 (05-01 @ 04:58)  WBC Count: 10.80 (04-30 @ 05:16)  WBC Count: 8.32 (04-29 @ 05:40)      Creatinine: 1.4 (05-03 @ 07:49)    Creatinine: 1.4 (05-02 @ 04:30)    Creatinine: 1.4 (05-01 @ 04:58)    Creatinine: 1.5 (04-30 @ 05:16)    Creatinine: 1.5 (04-29 @ 05:40)    Creatinine: 1.7 (04-28 @ 16:28)                     MEDICATIONS  (STANDING):  aspirin  chewable 81 milliGRAM(s) Oral daily  furosemide   Injectable 40 milliGRAM(s) IV Push <User Schedule>  metoprolol tartrate 100 milliGRAM(s) Oral two times a day  nystatin Powder 1 Application(s) Topical three times a day  pantoprazole    Tablet 40 milliGRAM(s) Oral two times a day  rivaroxaban 15 milliGRAM(s) Oral with dinner  vancomycin    Solution 125 milliGRAM(s) Oral every 6 hours    MEDICATIONS  (PRN):  acetaminophen   Tablet .. 650 milliGRAM(s) Oral every 4 hours PRN Temp greater or equal to 38.5C (101.3F)  ALBUTerol    90 MICROgram(s) HFA Inhaler 2 Puff(s) Inhalation every 4 hours PRN Shortness of Breath and/or Wheezing  ondansetron Injectable 4 milliGRAM(s) IV Push every 6 hours PRN Nausea and/or Vomiting denies diarrhea today  no chest pain  no SOB sittingup in bed   only complaint is about the food    Vital Signs Last 24 Hrs  T(C): 36 (02 May 2020 14:56), Max: 36.1 (02 May 2020 11:49)  T(F): 96.8 (02 May 2020 14:56), Max: 97 (02 May 2020 11:49)  HR: 86 (03 May 2020 05:54) (84 - 100)  BP: 132/58 (03 May 2020 05:54) (105/66 - 132/58)  BP(mean): --  RR: 18 (03 May 2020 05:54) (18 - 18)  SpO2: 96% (03 May 2020 05:54) (92% - 96%)      PHYSICAL EXAM:  GENERAL: NAD  Pulm: dec BS at bases no wheeze, rhonchi  CV: Regular rate and rhythm; No murmurs, rubs, or gallops  GI: Soft, Nontender, Nondistended; Bowel sounds present  EXTREMITIES:   edema+  PSYCH: AAOx3  NEUROLOGY: non-focal  05-03    139  |  90<L>  |  15  ----------------------------<  102<H>  3.5   |  35<H>  |  1.4    Ca    8.7      03 May 2020 07:49  Mg     1.8     05-03    TPro  5.5<L>  /  Alb  2.9<L>  /  TBili  1.0  /  DBili  x   /  AST  27  /  ALT  29  /  AlkPhos  177<H>  05-03                             13.0   6.97  )-----------( 225      ( 03 May 2020 07:49 )             40.2   05-03    139  |  90<L>  |  15  ----------------------------<  102<H>  3.5   |  35<H>  |  1.4    Ca    8.7      03 May 2020 07:49  Mg     1.8     05-03    TPro  5.5<L>  /  Alb  2.9<L>  /  TBili  1.0  /  DBili  x   /  AST  27  /  ALT  29  /  AlkPhos  177<H>  05-03    WBC Count: 6.97 (05-03 @ 07:49)  WBC Count: 7.91 (05-02 @ 04:30)  WBC Count: 9.17 (05-01 @ 04:58)  WBC Count: 10.80 (04-30 @ 05:16)  WBC Count: 8.32 (04-29 @ 05:40)      Creatinine: 1.4 (05-03 @ 07:49)    Creatinine: 1.4 (05-02 @ 04:30)    Creatinine: 1.4 (05-01 @ 04:58)    Creatinine: 1.5 (04-30 @ 05:16)    Creatinine: 1.5 (04-29 @ 05:40)    Creatinine: 1.7 (04-28 @ 16:28)                     MEDICATIONS  (STANDING):  aspirin  chewable 81 milliGRAM(s) Oral daily  furosemide   Injectable 40 milliGRAM(s) IV Push <User Schedule>  metoprolol tartrate 100 milliGRAM(s) Oral two times a day  nystatin Powder 1 Application(s) Topical three times a day  pantoprazole    Tablet 40 milliGRAM(s) Oral two times a day  rivaroxaban 15 milliGRAM(s) Oral with dinner  vancomycin    Solution 125 milliGRAM(s) Oral every 6 hours    MEDICATIONS  (PRN):  acetaminophen   Tablet .. 650 milliGRAM(s) Oral every 4 hours PRN Temp greater or equal to 38.5C (101.3F)  ALBUTerol    90 MICROgram(s) HFA Inhaler 2 Puff(s) Inhalation every 4 hours PRN Shortness of Breath and/or Wheezing  ondansetron Injectable 4 milliGRAM(s) IV Push every 6 hours PRN Nausea and/or Vomiting

## 2020-05-03 NOTE — PROGRESS NOTE ADULT - ASSESSMENT
VANI   - Cr stable   - no hydro or stones on renal sono   - trace proteinuria + pyuria   baseline Cr 0.7 in 2018  slight contraction alkalosis  acute on chronic HFpEF  right heart failure  EF 45% with mod MR, mod TR, mod pulm HTN, RV overload and R pleural effusion  Cdiff diarrhea  afib with RVR    plan:    iv lasix --> can change to PO next day or so  no thoracentesis per pulm  f/u ABG, prn low dose diamox  off ACE-I  monitor lytes and renal function  fluid restriction  daily wt  complete po vanco course  contact isolation  physical therapy  d/w Dr. Alejandra

## 2020-05-03 NOTE — CONSULT NOTE ADULT - SUBJECTIVE AND OBJECTIVE BOX
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 pt did not objectively measure temp at home. But did endorse body aches and general lethargy a/w poor po intake. She noticed some palpitations today which prompted her to come to [facility] ED. She lives w/ her daughter and denies any sick contacts and was compliant w/ social isolation and mostly stayed at home. Denies any N/V/D, orthopnea, PND, dysuria, frequency, urgency, or confusion.     In the ED, vitals:  irregular and Tmax 100.7. Was given x1 azithro and rocephin x1. lasix 40 x1 and Cardizem 10 IV push which rate controlled her at 110 bpm. (25 Apr 2020 10:39)      PAST MEDICAL & SURGICAL HISTORY:  History of stomach cancer: 25 years ago-- had stomach surgery  Hyperlipidemia  CAD S/P percutaneous coronary angioplasty  Atrial fibrillation  HTN (hypertension)  Diverticulitis  S/P small bowel resection  H/O abdominal hysterectomy      SOCIAL HX:   Smoking                         ETOH                            Other    FAMILY HISTORY:  No pertinent family history in first degree relatives  .  No cardiovascular or pulmonary family history     Review of System:  See HPI    Allergies    penicillin (Anaphylaxis)  penicillin (Unknown)    Intolerances          PHYSICAL EXAM  Vital Signs Last 24 Hrs  T(C): 36 (02 May 2020 14:56), Max: 36.1 (02 May 2020 11:49)  T(F): 96.8 (02 May 2020 14:56), Max: 97 (02 May 2020 11:49)  HR: 86 (03 May 2020 05:54) (84 - 100)  BP: 132/58 (03 May 2020 05:54) (105/66 - 132/58)  RR: 18 (03 May 2020 05:54) (18 - 18)  SpO2: 96% (03 May 2020 05:54) (92% - 96%)    General: In NAD  HEENT: MAIKEL             Lymphatic system: No cervical LN     Lungs: Nawaf BS  Cardiovascular: Regular  Gastrointestinal: Soft.  + BS   Musculoskeletal: No Clubbing.  Full range of motion.. Moves all extremities  Skin: Warm.  Intact  Neurological: No motor or sensory deficit       LABS:                          13.0   6.97  )-----------( 225      ( 03 May 2020 07:49 )             40.2                                               05-03    139  |  90<L>  |  15  ----------------------------<  102<H>  3.5   |  35<H>  |  1.4    Ca    8.7      03 May 2020 07:49  Mg     1.8     05-03    TPro  5.5<L>  /  Alb  2.9<L>  /  TBili  1.0  /  DBili  x   /  AST  27  /  ALT  29  /  AlkPhos  177<H>  05-03                                                   LIVER FUNCTIONS - ( 03 May 2020 07:49 )  Alb: 2.9 g/dL / Pro: 5.5 g/dL / ALK PHOS: 177 U/L / ALT: 29 U/L / AST: 27 U/L / GGT: x                                                                                                MEDICATIONS  (STANDING):  aspirin  chewable 81 milliGRAM(s) Oral daily  enoxaparin Injectable 40 milliGRAM(s) SubCutaneous daily  furosemide   Injectable 40 milliGRAM(s) IV Push <User Schedule>  furosemide   Injectable 40 milliGRAM(s) IV Push once  metoprolol tartrate 100 milliGRAM(s) Oral two times a day  nystatin Powder 1 Application(s) Topical three times a day  pantoprazole    Tablet 40 milliGRAM(s) Oral two times a day  vancomycin    Solution 125 milliGRAM(s) Oral every 6 hours    MEDICATIONS  (PRN):  acetaminophen   Tablet .. 650 milliGRAM(s) Oral every 4 hours PRN Temp greater or equal to 38.5C (101.3F)  ALBUTerol    90 MICROgram(s) HFA Inhaler 2 Puff(s) Inhalation every 4 hours PRN Shortness of Breath and/or Wheezing  ondansetron Injectable 4 milliGRAM(s) IV Push every 6 hours PRN Nausea and/or Vomiting      CXR: Improving pleural effusions, greater on right

## 2020-05-03 NOTE — PROGRESS NOTE ADULT - ASSESSMENT
#acute on chronic diastolic chf: improving   chest xray still with effusions right side - not improved after diuresis - pending pulmonary for thoracentesis  c/w  IV lasix 40 qday   repeat chest xray tomorrow AM    #A-FIB   HR better controlled on lopressor 100 q12h   HOLD  xarelto 15 qday for potential thoracenteses     #Diarrhea due to C-diff colitis: monitor electrolytes   c/w po vanco total of 14 days     #VANI on CKD stage 3 - stable monitor while on IV lasix     #HTN: controlled         #dvt ppx - sq heparin while xarelto on hold          Progress note handoff:   pending: CHF to improve , pulmonary, possible thora  disposition: snf   discussion:  with patient #acute on chronic diastolic chf: improving   chest xray still with effusions right side - not improved after diuresis - pending pulmonary for thoracentesis  c/w  IV lasix 40 qday   repeat chest xray tomorrow AM    #contraction alkalosis due to lasix - decrease lasix    #A-FIB   HR better controlled on lopressor 100 q12h   HOLD  xarelto 15 qday for potential thoracenteses -restart if no plan for tap    #Diarrhea due to C-diff colitis: monitor electrolytes   c/w po vanco total of 14 days     #VANI on CKD stage 3 - stable monitor while on IV lasix     #HTN: controlled         #dvt ppx - sq heparin while xarelto on hold          Progress note handoff:   pending: CHF to improve , pulmonary, possible thora  disposition: snf   discussion:  with patient

## 2020-05-03 NOTE — PROGRESS NOTE ADULT - SUBJECTIVE AND OBJECTIVE BOX
NEPHROLOGY FOLLOW UP NOTE    pt seen and examined  cr stable  still on iv lasix  no diarrhea  pulm input noted  eating some  still quite weak    PAST MEDICAL & SURGICAL HISTORY:  History of stomach cancer: 25 years ago-- had stomach surgery  Hyperlipidemia  CAD S/P percutaneous coronary angioplasty  Atrial fibrillation  HTN (hypertension)  Diverticulitis  S/P small bowel resection  H/O abdominal hysterectomy    Allergies:  penicillin (Anaphylaxis)  penicillin (Unknown)    Home Medications Reviewed    SOCIAL HISTORY:  Denies ETOH,Smoking,   FAMILY HISTORY:  No pertinent family history in first degree relatives        REVIEW OF SYSTEMS:  CONSTITUTIONAL: + weakness, no fevers or chills  EYES/ENT: No visual changes;  No vertigo or throat pain   NECK: No pain or stiffness  RESPIRATORY: No cough, wheezing, hemoptysis; No shortness of breath  CARDIOVASCULAR: No chest pain or palpitations.  GASTROINTESTINAL: No abdominal or epigastric pain. No nausea, vomiting, or hematemesis; No diarrhea or constipation. No melena or hematochezia.  GENITOURINARY: No dysuria, frequency, foamy urine, urinary urgency, incontinence or hematuria  NEUROLOGICAL: No numbness or weakness  SKIN: No itching, burning, rashes, or lesions   VASCULAR: No bilateral lower extremity edema.   All other review of systems is negative unless indicated above.    PHYSICAL EXAM:  Constitutional: NAD  HEENT: anicteric sclera, oropharynx clear, MMM  Neck: No JVD  Respiratory: CTAB, no wheezes, rales or rhonchi  Cardiovascular: S1, S2, RRR  Gastrointestinal: BS+, soft, NT/ND  Extremities: No cyanosis or clubbing. No peripheral edema  Neurological: A/O x 3, no focal deficits  Psychiatric: Normal mood, normal affect  : No CVA tenderness. No jacob.   Skin: No rashes    Hospital Medications:   MEDICATIONS  (STANDING):  aspirin  chewable 81 milliGRAM(s) Oral daily  enoxaparin Injectable 40 milliGRAM(s) SubCutaneous daily  furosemide   Injectable 40 milliGRAM(s) IV Push once  metoprolol tartrate 100 milliGRAM(s) Oral two times a day  nystatin Powder 1 Application(s) Topical three times a day  pantoprazole    Tablet 40 milliGRAM(s) Oral two times a day  vancomycin    Solution 125 milliGRAM(s) Oral every 6 hours        VITALS:  T(F): 97.3 (20 @ 13:06), Max: 97.3 (20 @ 13:06)  HR: 89 (20 @ 13:06)  BP: 114/54 (20 @ 13:06)  RR: 18 (20 @ 13:06)  SpO2: 96% (20 @ 05:54)  Wt(kg): --     @ 07:01  -   @ 07:00  --------------------------------------------------------  IN: 845 mL / OUT: 1325 mL / NET: -480 mL     @ 07:01  -   @ 07:00  --------------------------------------------------------  IN: 65 mL / OUT: 550 mL / NET: -485 mL     @ 07:01  -   @ 15:19  --------------------------------------------------------  IN: 0 mL / OUT: 1350 mL / NET: -1350 mL          LABS:      139  |  90<L>  |  15  ----------------------------<  102<H>  3.5   |  35<H>  |  1.4    Ca    8.7      03 May 2020 07:49  Mg     1.8         TPro  5.5<L>  /  Alb  2.9<L>  /  TBili  1.0  /  DBili      /  AST  27  /  ALT  29  /  AlkPhos  177<H>                            13.0   6.97  )-----------( 225      ( 03 May 2020 07:49 )             40.2       Urine Studies:  Urinalysis Basic - ( 2020 13:47 )    Color: Light Yellow / Appearance: Slightly Turbid / S.008 / pH:   Gluc:  / Ketone: Negative  / Bili: Negative / Urobili: <2 mg/dL   Blood:  / Protein: Trace / Nitrite: Negative   Leuk Esterase: Large / RBC: 1 /HPF /  /HPF   Sq Epi:  / Non Sq Epi: 0 /HPF / Bacteria: Negative      Protein/Creatinine Ratio Calculation: 0.8 Ratio ( @ 13:47)  Creatinine, Random Urine: 19 mg/dL ( @ 13:47)      RADIOLOGY & ADDITIONAL STUDIES:

## 2020-05-04 LAB
ANION GAP SERPL CALC-SCNC: 13 MMOL/L — SIGNIFICANT CHANGE UP (ref 7–14)
APTT BLD: 30.6 SEC — SIGNIFICANT CHANGE UP (ref 27–39.2)
BUN SERPL-MCNC: 16 MG/DL — SIGNIFICANT CHANGE UP (ref 10–20)
CALCIUM SERPL-MCNC: 8.9 MG/DL — SIGNIFICANT CHANGE UP (ref 8.5–10.1)
CHLORIDE SERPL-SCNC: 87 MMOL/L — LOW (ref 98–110)
CO2 SERPL-SCNC: 39 MMOL/L — HIGH (ref 17–32)
CREAT SERPL-MCNC: 1.4 MG/DL — SIGNIFICANT CHANGE UP (ref 0.7–1.5)
GLUCOSE SERPL-MCNC: 109 MG/DL — HIGH (ref 70–99)
HCT VFR BLD CALC: 44.2 % — SIGNIFICANT CHANGE UP (ref 37–47)
HGB BLD-MCNC: 14.7 G/DL — SIGNIFICANT CHANGE UP (ref 12–16)
INR BLD: 1.16 RATIO — SIGNIFICANT CHANGE UP (ref 0.65–1.3)
MAGNESIUM SERPL-MCNC: 1.7 MG/DL — LOW (ref 1.8–2.4)
MCHC RBC-ENTMCNC: 27.3 PG — SIGNIFICANT CHANGE UP (ref 27–31)
MCHC RBC-ENTMCNC: 33.3 G/DL — SIGNIFICANT CHANGE UP (ref 32–37)
MCV RBC AUTO: 82 FL — SIGNIFICANT CHANGE UP (ref 81–99)
NRBC # BLD: 0 /100 WBCS — SIGNIFICANT CHANGE UP (ref 0–0)
PLATELET # BLD AUTO: 217 K/UL — SIGNIFICANT CHANGE UP (ref 130–400)
POTASSIUM SERPL-MCNC: 3.6 MMOL/L — SIGNIFICANT CHANGE UP (ref 3.5–5)
POTASSIUM SERPL-SCNC: 3.6 MMOL/L — SIGNIFICANT CHANGE UP (ref 3.5–5)
PROTHROM AB SERPL-ACNC: 13.3 SEC — HIGH (ref 9.95–12.87)
RBC # BLD: 5.39 M/UL — SIGNIFICANT CHANGE UP (ref 4.2–5.4)
RBC # FLD: 19.3 % — HIGH (ref 11.5–14.5)
SODIUM SERPL-SCNC: 139 MMOL/L — SIGNIFICANT CHANGE UP (ref 135–146)
WBC # BLD: 8.24 K/UL — SIGNIFICANT CHANGE UP (ref 4.8–10.8)
WBC # FLD AUTO: 8.24 K/UL — SIGNIFICANT CHANGE UP (ref 4.8–10.8)

## 2020-05-04 PROCEDURE — 99233 SBSQ HOSP IP/OBS HIGH 50: CPT

## 2020-05-04 PROCEDURE — 71045 X-RAY EXAM CHEST 1 VIEW: CPT | Mod: 26

## 2020-05-04 RX ORDER — ACETAZOLAMIDE 250 MG/1
250 TABLET ORAL ONCE
Refills: 0 | Status: COMPLETED | OUTPATIENT
Start: 2020-05-04 | End: 2020-05-04

## 2020-05-04 RX ORDER — RIVAROXABAN 15 MG-20MG
15 KIT ORAL
Refills: 0 | Status: DISCONTINUED | OUTPATIENT
Start: 2020-05-04 | End: 2020-05-06

## 2020-05-04 RX ORDER — MAGNESIUM SULFATE 500 MG/ML
2 VIAL (ML) INJECTION ONCE
Refills: 0 | Status: COMPLETED | OUTPATIENT
Start: 2020-05-04 | End: 2020-05-04

## 2020-05-04 RX ORDER — FUROSEMIDE 40 MG
20 TABLET ORAL DAILY
Refills: 0 | Status: DISCONTINUED | OUTPATIENT
Start: 2020-05-05 | End: 2020-05-06

## 2020-05-04 RX ADMIN — Medication 100 MILLIGRAM(S): at 07:08

## 2020-05-04 RX ADMIN — ACETAZOLAMIDE 250 MILLIGRAM(S): 250 TABLET ORAL at 11:04

## 2020-05-04 RX ADMIN — Medication 1 TABLET(S): at 13:05

## 2020-05-04 RX ADMIN — PANTOPRAZOLE SODIUM 40 MILLIGRAM(S): 20 TABLET, DELAYED RELEASE ORAL at 18:28

## 2020-05-04 RX ADMIN — Medication 81 MILLIGRAM(S): at 11:05

## 2020-05-04 RX ADMIN — NYSTATIN CREAM 1 APPLICATION(S): 100000 CREAM TOPICAL at 07:08

## 2020-05-04 RX ADMIN — Medication 125 MILLIGRAM(S): at 07:07

## 2020-05-04 RX ADMIN — Medication 50 GRAM(S): at 11:04

## 2020-05-04 RX ADMIN — Medication 125 MILLIGRAM(S): at 18:29

## 2020-05-04 RX ADMIN — Medication 20 MILLIGRAM(S): at 07:08

## 2020-05-04 RX ADMIN — Medication 125 MILLIGRAM(S): at 11:05

## 2020-05-04 RX ADMIN — Medication 100 MILLIGRAM(S): at 18:28

## 2020-05-04 RX ADMIN — PANTOPRAZOLE SODIUM 40 MILLIGRAM(S): 20 TABLET, DELAYED RELEASE ORAL at 07:08

## 2020-05-04 RX ADMIN — NYSTATIN CREAM 1 APPLICATION(S): 100000 CREAM TOPICAL at 13:05

## 2020-05-04 RX ADMIN — NYSTATIN CREAM 1 APPLICATION(S): 100000 CREAM TOPICAL at 21:49

## 2020-05-04 RX ADMIN — RIVAROXABAN 15 MILLIGRAM(S): KIT at 18:28

## 2020-05-04 NOTE — PROGRESS NOTE ADULT - ASSESSMENT
#acute on chronic diastolic chf: improving   chest xray still with effusions right side - not improved after diuresis - but clincially improved off O2  seen by pulm - no thoracentesis for now - nonloculated  lowered lasix to 20mg, diamox x1 today      #contraction alkalosis due to lasix - decreased lasix, give diamox x1  check bmp in AM    #A-FIB   HR better controlled on lopressor 100 q12h   restarted on xarelto 15 qday    #Diarrhea due to C-diff colitis: monitor electrolytes   c/w po vanco total of 14 days     #VANI on CKD stage 3 - stable monitor while on IV lasix     #HTN: controlled         #dvt ppx - xarelto         Progress note handoff:   pending: monitor alkalosis , pleural effusion  disposition: snf   discussion:  with patient and daughter paris over phone - updated with all qs answered  plan is for SNF when medically ready  dc jacob - [facility]

## 2020-05-04 NOTE — PROGRESS NOTE ADULT - ASSESSMENT
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    Acute hypoxic respiratory failure likely fluid overload with likely acute on chronic HFrEF exacerbation / right sided heart failure with mod. pulm. HTN- COVID neg  - c/w NC O2 to maintain SPO2 92-94%/ BIPAP PRN/HS.  - received lasix 40mg IV ~1PM, ordered daily for 12PM  - Monitor I&O's/ daily weights/ monitor electrolytes/ maintain K>4/ mag >2  - fluid restriction to 1.5L/day  - c/w lopressor/ xarelto.   - albuterol prn for sob.  - TTE noted: EF of 45-50% with mildly dec LVSF/ RV volume and pressure overload/ mod. MR/TR/mod pleural effusion/mod. pulm. HTN  - Cardio consult appreciated- recs followed.   - Bilateral LE doppler neg for dvt.   - nephro consult appreciated  -chest xray still with effusions right side - not improved after diuresis - pending pulmonary for thoracentesis  -c/w  IV lasix 40 qday   -f/u repeat CXR   -per pulm: Check ABG, if ph>7.5 consider acetazolamide    # A-FIB with RVR- remains stable ~100  - switched to metoprolol 100 BID today, 4/29 PM  - c/w lopressor/ xarelto.   -HR better controlled on lopressor 100 q12h   -HOLD  xarelto 15 qday for potential thoracenteses -restart if no plan for tap    # Diarrhea due to C-diff colitis:   - started on oral vanco 4/27, c/w po vanco total of 14 days   - contact isolation    # VANI on CKD stage 3 with HAGMA- pre-renal with diuresis and diarrhea:   - received lasix 40mg IV ~1PM, ordered daily for 12PM  - monitor I&O's strictly  - nephro consult appreciated    # HTN- stable   - switched to metoprolol 100 BID today, 4/29 PM      DVT ppx: sq heparin while xarelto on hold   GI ppx: protonix  Progress note handoff:   pending: CHF to improve , pulmonary, possible thora  disposition: snf 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    Acute hypoxic respiratory failure likely fluid overload with likely acute on chronic HFrEF exacerbation / right sided heart failure with mod. pulm. HTN- COVID neg  - c/w NC O2 to maintain SPO2 92-94%/ BIPAP PRN/HS.  - received lasix 40mg IV ~1PM, ordered daily for 12PM  - Monitor I&O's/ daily weights/ monitor electrolytes/ maintain K>4/ mag >2  - fluid restriction to 1.5L/day  - c/w lopressor/ xarelto.   - albuterol prn for sob.  - TTE noted: EF of 45-50% with mildly dec LVSF/ RV volume and pressure overload/ mod. MR/TR/mod pleural effusion/mod. pulm. HTN  - Cardio consult appreciated- recs followed.   - Bilateral LE doppler neg for dvt.   - nephro consult appreciated  -chest xray still with effusions right side - not improved after diuresis - pending pulmonary for thoracentesis  -per pulm, no thoracocentesis for now  -c/w IV lasix 20 qday, will consider switch to po  -repeat CXR 5/4 : Stable right basilar opacity/effusion. No pneumothorax  -per pulm: Check ABG, if ph>7.5 consider acetazolamide    # A-FIB with RVR- remains stable ~100  - switched to metoprolol 100 BID today, 4/29 PM  - c/w lopressor/ xarelto.   -HR better controlled on lopressor 100 q12h   -HOLD  xarelto 15 qday for potential thoracenteses -restart if no plan for tap    # Diarrhea due to C-diff colitis:   - started on oral vanco 4/27, c/w po vanco total of 14 days, (final dose due on 5/10)  - contact isolation    # VANI on CKD stage 3 with HAGMA- pre-renal with diuresis and diarrhea:   - received lasix 40mg IV ~1PM, was ordered daily for 12PM  - currently on lasix 20 mg IV daily  - monitor I&O's strictly  - nephro consult appreciated    # HTN- stable   - switched to metoprolol 100 BID today, 4/29 PM      DVT ppx: sq heparin while xarelto on hold   GI ppx: protonix  Progress note handoff:   pending: CHF to improve , pulmonary, possible thora  disposition: snf

## 2020-05-04 NOTE — PROGRESS NOTE ADULT - SUBJECTIVE AND OBJECTIVE BOX
NEPHROLOGY FOLLOW UP NOTE    pt seen and examined  cr stable  lasix changed to PO  given diamox  comfortable  still on O2 via NC    PAST MEDICAL & SURGICAL HISTORY:  History of stomach cancer: 25 years ago-- had stomach surgery  Hyperlipidemia  CAD S/P percutaneous coronary angioplasty  Atrial fibrillation  HTN (hypertension)  Diverticulitis  S/P small bowel resection  H/O abdominal hysterectomy    Allergies:  penicillin (Anaphylaxis)  penicillin (Unknown)    Home Medications Reviewed    SOCIAL HISTORY:  Denies ETOH,Smoking,   FAMILY HISTORY:  No pertinent family history in first degree relatives        REVIEW OF SYSTEMS:  CONSTITUTIONAL: + weakness, no fevers or chills  EYES/ENT: No visual changes;  No vertigo or throat pain   NECK: No pain or stiffness  RESPIRATORY: No cough, wheezing, hemoptysis; No shortness of breath  CARDIOVASCULAR: No chest pain or palpitations.  GASTROINTESTINAL: No abdominal or epigastric pain. No nausea, vomiting, or hematemesis; No diarrhea or constipation. No melena or hematochezia.  GENITOURINARY: No dysuria, frequency, foamy urine, urinary urgency, incontinence or hematuria  NEUROLOGICAL: No numbness or weakness  SKIN: No itching, burning, rashes, or lesions   VASCULAR: No bilateral lower extremity edema.   All other review of systems is negative unless indicated above.    PHYSICAL EXAM:  Constitutional: NAD  HEENT: anicteric sclera, oropharynx clear, MMM  Neck: No JVD  Respiratory: CTAB, no wheezes, rales or rhonchi  Cardiovascular: S1, S2, RRR  Gastrointestinal: BS+, soft, NT/ND  Extremities: No cyanosis or clubbing. No peripheral edema  Neurological: A/O x 3, no focal deficits  Psychiatric: Normal mood, normal affect  : No CVA tenderness  Skin: No rashes    Hospital Medications:   MEDICATIONS  (STANDING):  aspirin  chewable 81 milliGRAM(s) Oral daily  metoprolol tartrate 100 milliGRAM(s) Oral two times a day  multivitamin 1 Tablet(s) Oral daily  nystatin Powder 1 Application(s) Topical three times a day  pantoprazole    Tablet 40 milliGRAM(s) Oral two times a day  rivaroxaban 15 milliGRAM(s) Oral with dinner  vancomycin    Solution 125 milliGRAM(s) Oral every 6 hours        VITALS:  T(F): 96.8 (20 @ 17:34), Max: 98 (20 @ 13:30)  HR: 98 (20 @ 17:34)  BP: 122/75 (20 @ 17:34)  RR: 18 (20 @ 17:34)  SpO2: 93% (20 @ 10:07)  Wt(kg): --     @ 07:01  -   @ 07:00  --------------------------------------------------------  IN: 65 mL / OUT: 550 mL / NET: -485 mL     @ 07:01  -   @ 07:00  --------------------------------------------------------  IN: 0 mL / OUT: 1350 mL / NET: -1350 mL     @ 07:01  -   @ 17:46  --------------------------------------------------------  IN: 0 mL / OUT: 201 mL / NET: -201 mL          LABS:      139  |  87<L>  |  16  ----------------------------<  109<H>  3.6   |  39<H>  |  1.4    Ca    8.9      04 May 2020 06:18  Mg     1.7         TPro  5.5<L>  /  Alb  2.9<L>  /  TBili  1.0  /  DBili      /  AST  27  /  ALT  29  /  AlkPhos  177<H>                            14.7   8.24  )-----------( 217      ( 04 May 2020 06:18 )             44.2       Urine Studies:  Urinalysis Basic - ( 2020 13:47 )    Color: Light Yellow / Appearance: Slightly Turbid / S.008 / pH:   Gluc:  / Ketone: Negative  / Bili: Negative / Urobili: <2 mg/dL   Blood:  / Protein: Trace / Nitrite: Negative   Leuk Esterase: Large / RBC: 1 /HPF /  /HPF   Sq Epi:  / Non Sq Epi: 0 /HPF / Bacteria: Negative      Protein/Creatinine Ratio Calculation: 0.8 Ratio ( @ 13:47)  Creatinine, Random Urine: 19 mg/dL ( @ 13:47)      RADIOLOGY & ADDITIONAL STUDIES:

## 2020-05-04 NOTE — CHART NOTE - NSCHARTNOTEFT_GEN_A_CORE
Registered Dietitian Follow-Up     Patient Profile Reviewed                           Yes [x]   No []     Nutrition History Previously Obtained        Yes [x]  No []       Pertinent Subjective Information: Per RN; pt only had cottage cheese and muffin for breakfast. Pt has not been receiving po supplement. RD notified CA. RD requested RN to obtain ensure compact from pantry and give it to pt; RN verbalized understanding.      Pertinent Medical Interventions: Acute hypoxic respiratory failure likely fluid overload with likely acute on chronic HFrEF exacerbation / right sided heart failure with mod. pulm. HTN- COVID neg- on NC O2 to maintain SPO2 92-94%/ BIPAP PRN/HS, on fluid restrictions and cardio following.  A-FIB with RVR- remains stable. Diarrhea due to C-diff colitis: on contact isolation. VANI on CKD stage 3 with HAGMA- pre-renal with diuresis and diarrhea: nephro following. No need for thoracentesis for now as per pulm.      Diet order: DASH/TLC, lactose restricted, 1200 ml fluid restriction with Ensure compact BID    Anthropometrics:  - Ht. 167.6 cm   - Wt.  89.3 kg (5/1) vs 90.2 kg (5/2) vs 82.4 kg (5/4) vs 92.9kg on 4/30 vs. 91.2kg on 4/27- pt. with edema, will continue to monitor wt trends   - %wt change  - BMI 32.5 kg (using wt of 91.2 kg)  -  lbs      Pertinent Lab Data: 5/4: Glucose serum- 109, Mg-1.7     Pertinent Meds: Xarelto, Diamox, furosemide, mg sulfate, metoprolol, Zofran, Protonix      Physical Findings:  - Appearance: AAox4; +2 edema to R/L legs   - GI function: pt has diarrhea as per flowsheets and RN; LBM 5/4  - Tubes: none   - Oral/Mouth cavity: none   - Skin: pressure ulcer stg II to R buttocks      Nutrition Requirements  (from RD note on 4/27)   Weight Used: ideal 59.1 kg      Estimated Energy Needs    Continue [x]  Adjust []  1772-2068kcal (30-35kcal/kg IBW) for pressure ulcer    Estimated Protein Needs    Continue [x]  Adjust []  71-83g (1.2-1.4g/kg IBW) VANI on CKD considered    Estimated Fluid Needs        Continue [x]  Adjust []  1200 ml fluid restrictions per LIP for CHF     Nutrient Intake: not meeting needs         [x] Previous Nutrition Diagnosis:  Increased Nutrient Needs...               [x] Ongoing          [] Resolved     Nutrition Intervention meals and snacks, vitamin and mineral supplement     Goal/Expected Outcome: In 3 days, pt to to consistently consume at elast 75% PO and supplement      Indicator/Monitoring: energy intake, body composition, NFPF, electrolyte/renal profile.    Recommendations: Add  MVI daily.

## 2020-05-04 NOTE — PROGRESS NOTE ADULT - ASSESSMENT
ASSESSMENT:    HFrEF with exacerbation  Mod MR  pHTN  Afib on xarelto  C diff   Bedside CUS - Small anechoic fluid; no loculations/septations    RECOMMENDATIONS:    Continue diuresis  Maintain SpO2> 92%, wean oxygen as tolerated  DVT ppx- is on xarelto for afib  Will reassess in am ; No thoracentesis for now   OOB to chair

## 2020-05-04 NOTE — PROGRESS NOTE ADULT - ASSESSMENT
VANI   - Cr stable   - no hydro or stones on renal sono   - trace proteinuria + pyuria   baseline Cr 0.7 in 2018  slight contraction alkalosis  acute on chronic HFpEF  right heart failure  EF 45% with mod MR, mod TR, mod pulm HTN, RV overload and R pleural effusion  Cdiff diarrhea  afib with RVR    plan:    agree with diamox x 1 and change to po lasix  off ACE-I  dc jacob and TOV  f/u pulm  monitor lytes and renal function  fluid restriction  complete po vanco course  contact isolation  physical therapy  dc planning

## 2020-05-04 NOTE — PROGRESS NOTE ADULT - SUBJECTIVE AND OBJECTIVE BOX
Patient is a 79y old  Female who presents with a chief complaint of shortness of breath (04 May 2020 08:29)        SUBJECTIVE: No acute events overnight      REVIEW OF SYSTEMS:  See HPI    PHYSICAL EXAM  Vital Signs Last 24 Hrs  T(C): 36.4 (04 May 2020 06:22), Max: 36.4 (04 May 2020 01:33)  T(F): 97.5 (04 May 2020 06:22), Max: 97.6 (04 May 2020 01:33)  HR: 106 (04 May 2020 06:22) (89 - 106)  BP: 131/63 (04 May 2020 06:22) (114/54 - 152/80)  BP(mean): --  RR: 20 (04 May 2020 06:22) (18 - 20)  SpO2: 92% (04 May 2020 06:22) (92% - 97%)    General: In NAD  HEENT: MAIKEL               Lymphatic system: No Cervical LN    Respiratory: dec BS right base  Cardiovascular: Regular  Gastrointestinal: Soft. + BS  Musculoskeletal: No clubbing.  moves all extremities.  Full range of motion    Skin: Warm.  Intact  Neurological: No motor or sensory deficit      05-03-20 @ 07:01  -  05-04-20 @ 07:00  --------------------------------------------------------  IN:  Total IN: 0 mL    OUT:    Indwelling Catheter - Urethral: 750 mL    Voided: 600 mL  Total OUT: 1350 mL    Total NET: -1350 mL          LABS:                          14.7   8.24  )-----------( 217      ( 04 May 2020 06:18 )             44.2                                               05-04    139  |  87<L>  |  16  ----------------------------<  109<H>  3.6   |  39<H>  |  1.4    Ca    8.9      04 May 2020 06:18  Mg     1.7     05-04    TPro  5.5<L>  /  Alb  2.9<L>  /  TBili  1.0  /  DBili  x   /  AST  27  /  ALT  29  /  AlkPhos  177<H>  05-03      PT/INR - ( 04 May 2020 06:18 )   PT: 13.30 sec;   INR: 1.16 ratio         PTT - ( 04 May 2020 06:18 )  PTT:30.6 sec                                                                                     LIVER FUNCTIONS - ( 03 May 2020 07:49 )  Alb: 2.9 g/dL / Pro: 5.5 g/dL / ALK PHOS: 177 U/L / ALT: 29 U/L / AST: 27 U/L / GGT: x                                                                                                MEDICATIONS  (STANDING):  acetaZOLAMIDE    Tablet 250 milliGRAM(s) Oral once  aspirin  chewable 81 milliGRAM(s) Oral daily  furosemide   Injectable 20 milliGRAM(s) IV Push daily  magnesium sulfate  IVPB 2 Gram(s) IV Intermittent once  metoprolol tartrate 100 milliGRAM(s) Oral two times a day  nystatin Powder 1 Application(s) Topical three times a day  pantoprazole    Tablet 40 milliGRAM(s) Oral two times a day  rivaroxaban 15 milliGRAM(s) Oral with dinner  vancomycin    Solution 125 milliGRAM(s) Oral every 6 hours    MEDICATIONS  (PRN):  acetaminophen   Tablet .. 650 milliGRAM(s) Oral every 4 hours PRN Temp greater or equal to 38.5C (101.3F)  ALBUTerol    90 MICROgram(s) HFA Inhaler 2 Puff(s) Inhalation every 4 hours PRN Shortness of Breath and/or Wheezing  ondansetron Injectable 4 milliGRAM(s) IV Push every 6 hours PRN Nausea and/or Vomiting

## 2020-05-04 NOTE — PROGRESS NOTE ADULT - SUBJECTIVE AND OBJECTIVE BOX
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 pt did not objectively measure temp at home. But did endorse body aches and general lethargy a/w poor po intake. She noticed some palpitations today which prompted her to come to [facility] ED. She lives w/ her daughter and denies any sick contacts and was compliant w/ social isolation and mostly stayed at home. Denies any N/V/D, orthopnea, PND, dysuria, frequency, urgency, or confusion.     In the ED, vitals:  irregular and Tmax 100.7. Was given x1 azithro and rocephin x1. lasix 40 x1 and Cardizem 10 IV push which rate controlled her at 110 bpm. (25 Apr 2020 10:39)    Currently admitted to medicine with the primary diagnosis of Fluid overload     Today is hospital day 9d.     INTERVAL HPI / OVERNIGHT EVENTS:  Patient was examined and seen at bedside. This morning she is resting comfortably in bed and reports no new issues or overnight events. On RA, reports no fevers, SOB, palpitations, or lightheadedness. Wants to go home.    ROS: Otherwise unremarkable     PAST MEDICAL & SURGICAL HISTORY  History of stomach cancer: 25 years ago-- had stomach surgery  Hyperlipidemia  CAD S/P percutaneous coronary angioplasty  Atrial fibrillation  HTN (hypertension)  Diverticulitis  S/P small bowel resection  H/O abdominal hysterectomy    ALLERGIES  penicillin (Anaphylaxis)  penicillin (Unknown)    MEDICATIONS  STANDING MEDICATIONS  aspirin  chewable 81 milliGRAM(s) Oral daily  furosemide   Injectable 20 milliGRAM(s) IV Push daily  metoprolol tartrate 100 milliGRAM(s) Oral two times a day  nystatin Powder 1 Application(s) Topical three times a day  pantoprazole    Tablet 40 milliGRAM(s) Oral two times a day  rivaroxaban 15 milliGRAM(s) Oral with dinner  vancomycin    Solution 125 milliGRAM(s) Oral every 6 hours    PRN MEDICATIONS  acetaminophen   Tablet .. 650 milliGRAM(s) Oral every 4 hours PRN  ALBUTerol    90 MICROgram(s) HFA Inhaler 2 Puff(s) Inhalation every 4 hours PRN  ondansetron Injectable 4 milliGRAM(s) IV Push every 6 hours PRN    VITALS:  T(F): 97.5  HR: 106  BP: 131/63  RR: 20  SpO2: 92%    PHYSICAL EXAM  GEN: NAD, Resting comfortably in bed  PULM: mild bibasilar crackles, No wheezes  CVS: Regular rate and rhythm, S1-S2, no murmurs  ABD: Soft, non-tender, non-distended, no guarding  EXT: No edema  NEURO: A&Ox3, no focal deficits    LABS                        14.7   8.24  )-----------( 217      ( 04 May 2020 06:18 )             44.2     05-04    139  |  87<L>  |  16  ----------------------------<  109<H>  3.6   |  39<H>  |  1.4    Ca    8.9      04 May 2020 06:18  Mg     1.7     05-04    TPro  5.5<L>  /  Alb  2.9<L>  /  TBili  1.0  /  DBili  x   /  AST  27  /  ALT  29  /  AlkPhos  177<H>  05-03    PT/INR - ( 04 May 2020 06:18 )   PT: 13.30 sec;   INR: 1.16 ratio         PTT - ( 04 May 2020 06:18 )  PTT:30.6 sec 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 pt did not objectively measure temp at home. But did endorse body aches and general lethargy a/w poor po intake. She noticed some palpitations today which prompted her to come to [facility] ED. She lives w/ her daughter and denies any sick contacts and was compliant w/ social isolation and mostly stayed at home. Denies any N/V/D, orthopnea, PND, dysuria, frequency, urgency, or confusion.     In the ED, vitals:  irregular and Tmax 100.7. Was given x1 azithro and rocephin x1. lasix 40 x1 and Cardizem 10 IV push which rate controlled her at 110 bpm. (25 Apr 2020 10:39)    Currently admitted to medicine with the primary diagnosis of Fluid overload     Today is hospital day 9d.     INTERVAL HPI / OVERNIGHT EVENTS:  Patient was examined and seen at bedside. This morning she is resting comfortably in bed and reports no new issues or overnight events. On RA, reports no fevers, SOB, palpitations, or lightheadedness. Wants to go home. O2 sat 94-95 on RA at rest this AM.    ROS: Otherwise unremarkable     PAST MEDICAL & SURGICAL HISTORY  History of stomach cancer: 25 years ago-- had stomach surgery  Hyperlipidemia  CAD S/P percutaneous coronary angioplasty  Atrial fibrillation  HTN (hypertension)  Diverticulitis  S/P small bowel resection  H/O abdominal hysterectomy    ALLERGIES  penicillin (Anaphylaxis)  penicillin (Unknown)    MEDICATIONS  STANDING MEDICATIONS  aspirin  chewable 81 milliGRAM(s) Oral daily  furosemide   Injectable 20 milliGRAM(s) IV Push daily  metoprolol tartrate 100 milliGRAM(s) Oral two times a day  nystatin Powder 1 Application(s) Topical three times a day  pantoprazole    Tablet 40 milliGRAM(s) Oral two times a day  rivaroxaban 15 milliGRAM(s) Oral with dinner  vancomycin    Solution 125 milliGRAM(s) Oral every 6 hours    PRN MEDICATIONS  acetaminophen   Tablet .. 650 milliGRAM(s) Oral every 4 hours PRN  ALBUTerol    90 MICROgram(s) HFA Inhaler 2 Puff(s) Inhalation every 4 hours PRN  ondansetron Injectable 4 milliGRAM(s) IV Push every 6 hours PRN    VITALS:  T(F): 97.5  HR: 106  BP: 131/63  RR: 20  SpO2: 92%    PHYSICAL EXAM  GEN: NAD, Resting comfortably in bed  PULM: mild bibasilar crackles, No wheezes  CVS: Regular rate and rhythm, S1-S2, no murmurs  ABD: Soft, non-tender, non-distended, no guarding  EXT: No edema  NEURO: A&Ox3, no focal deficits    LABS                        14.7   8.24  )-----------( 217      ( 04 May 2020 06:18 )             44.2     05-04    139  |  87<L>  |  16  ----------------------------<  109<H>  3.6   |  39<H>  |  1.4    Ca    8.9      04 May 2020 06:18  Mg     1.7     05-04    TPro  5.5<L>  /  Alb  2.9<L>  /  TBili  1.0  /  DBili  x   /  AST  27  /  ALT  29  /  AlkPhos  177<H>  05-03    PT/INR - ( 04 May 2020 06:18 )   PT: 13.30 sec;   INR: 1.16 ratio         PTT - ( 04 May 2020 06:18 )  PTT:30.6 sec      RADIOLOGY:    < from: Xray Chest 1 View- PORTABLE-Routine (05.04.20 @ 08:18) >  EXAM:  XR CHEST PORTABLE ROUTINE 1V            PROCEDURE DATE:  05/04/2020            INTERPRETATION:  Clinical History/Reason for Exam:  CHF    Comparison: XR CHEST 5/2/2020 6:25 AM.      Findings:    Technique/Positioning:  Frontal portable radiograph of the chest.    Support devices:  none    Cardiac/mediastinum/hilum: Stable cardiomegaly    Lung parenchyma/ Pleura: Stable right basilar opacity/effusion. No pneumothorax      Skeleton/soft tissues: Stable degenerative changes      Impression:    Stable right basilar opacity/effusion. No pneumothorax        KARIS CASILLAS M.D., ATTENDING RADIOLOGIST  This document has been electronically signed. May  4 2020 10:06AM    < end of copied text >

## 2020-05-04 NOTE — PROGRESS NOTE ADULT - SUBJECTIVE AND OBJECTIVE BOX
denies diarrhea today  no chest pain  no SOB sittingup in bed   feels weak    Vital Signs Last 24 Hrs  T(C): 36.3 (04 May 2020 10:07), Max: 36.4 (04 May 2020 01:33)  T(F): 97.3 (04 May 2020 10:07), Max: 97.6 (04 May 2020 01:33)  HR: 106 (04 May 2020 06:22) (89 - 106)  BP: 101/58 (04 May 2020 10:07) (101/58 - 152/80)  BP(mean): --  RR: 18 (04 May 2020 10:07) (18 - 20)  SpO2: 93% (04 May 2020 10:07) (92% - 97%)    PHYSICAL EXAM:  GENERAL: NAD  Pulm: dec BS at bases no wheeze, rhonchi  CV: Regular rate and rhythm; No murmurs, rubs, or gallops  GI: Soft, Nontender, Nondistended; Bowel sounds present  EXTREMITIES:   edema+  PSYCH: AAOx3  NEUROLOGY: non-focal    05-04    139  |  87<L>  |  16  ----------------------------<  109<H>  3.6   |  39<H>  |  1.4    Ca    8.9      04 May 2020 06:18  Mg     1.7     05-04    TPro  5.5<L>  /  Alb  2.9<L>  /  TBili  1.0  /  DBili  x   /  AST  27  /  ALT  29  /  AlkPhos  177<H>  05-03                               14.7   8.24  )-----------( 217      ( 04 May 2020 06:18 )             44.2                MEDICATIONS  (STANDING):  aspirin  chewable 81 milliGRAM(s) Oral daily  furosemide   Injectable 40 milliGRAM(s) IV Push <User Schedule>  metoprolol tartrate 100 milliGRAM(s) Oral two times a day  nystatin Powder 1 Application(s) Topical three times a day  pantoprazole    Tablet 40 milliGRAM(s) Oral two times a day  rivaroxaban 15 milliGRAM(s) Oral with dinner  vancomycin    Solution 125 milliGRAM(s) Oral every 6 hours    MEDICATIONS  (PRN):  acetaminophen   Tablet .. 650 milliGRAM(s) Oral every 4 hours PRN Temp greater or equal to 38.5C (101.3F)  ALBUTerol    90 MICROgram(s) HFA Inhaler 2 Puff(s) Inhalation every 4 hours PRN Shortness of Breath and/or Wheezing  ondansetron Injectable 4 milliGRAM(s) IV Push every 6 hours PRN Nausea and/or Vomiting

## 2020-05-05 ENCOUNTER — TRANSCRIPTION ENCOUNTER (OUTPATIENT)
Age: 80
End: 2020-05-05

## 2020-05-05 LAB
ALBUMIN SERPL ELPH-MCNC: 3.3 G/DL — LOW (ref 3.5–5.2)
ALP SERPL-CCNC: 184 U/L — HIGH (ref 30–115)
ALT FLD-CCNC: 27 U/L — SIGNIFICANT CHANGE UP (ref 0–41)
ANION GAP SERPL CALC-SCNC: 13 MMOL/L — SIGNIFICANT CHANGE UP (ref 7–14)
ANION GAP SERPL CALC-SCNC: 13 MMOL/L — SIGNIFICANT CHANGE UP (ref 7–14)
AST SERPL-CCNC: 30 U/L — SIGNIFICANT CHANGE UP (ref 0–41)
BILIRUB SERPL-MCNC: 1.4 MG/DL — HIGH (ref 0.2–1.2)
BUN SERPL-MCNC: 17 MG/DL — SIGNIFICANT CHANGE UP (ref 10–20)
BUN SERPL-MCNC: 19 MG/DL — SIGNIFICANT CHANGE UP (ref 10–20)
CALCIUM SERPL-MCNC: 9 MG/DL — SIGNIFICANT CHANGE UP (ref 8.5–10.1)
CALCIUM SERPL-MCNC: 9.2 MG/DL — SIGNIFICANT CHANGE UP (ref 8.5–10.1)
CHLORIDE SERPL-SCNC: 87 MMOL/L — LOW (ref 98–110)
CHLORIDE SERPL-SCNC: 89 MMOL/L — LOW (ref 98–110)
CO2 SERPL-SCNC: 34 MMOL/L — HIGH (ref 17–32)
CO2 SERPL-SCNC: 36 MMOL/L — HIGH (ref 17–32)
CREAT SERPL-MCNC: 1.5 MG/DL — SIGNIFICANT CHANGE UP (ref 0.7–1.5)
CREAT SERPL-MCNC: 1.6 MG/DL — HIGH (ref 0.7–1.5)
GLUCOSE SERPL-MCNC: 107 MG/DL — HIGH (ref 70–99)
GLUCOSE SERPL-MCNC: 109 MG/DL — HIGH (ref 70–99)
HCT VFR BLD CALC: 48.9 % — HIGH (ref 37–47)
HGB BLD-MCNC: 15.1 G/DL — SIGNIFICANT CHANGE UP (ref 12–16)
MAGNESIUM SERPL-MCNC: 2.6 MG/DL — HIGH (ref 1.8–2.4)
MCHC RBC-ENTMCNC: 25.5 PG — LOW (ref 27–31)
MCHC RBC-ENTMCNC: 30.9 G/DL — LOW (ref 32–37)
MCV RBC AUTO: 82.6 FL — SIGNIFICANT CHANGE UP (ref 81–99)
NRBC # BLD: 0 /100 WBCS — SIGNIFICANT CHANGE UP (ref 0–0)
PLATELET # BLD AUTO: 203 K/UL — SIGNIFICANT CHANGE UP (ref 130–400)
POTASSIUM SERPL-MCNC: 3.4 MMOL/L — LOW (ref 3.5–5)
POTASSIUM SERPL-MCNC: 3.8 MMOL/L — SIGNIFICANT CHANGE UP (ref 3.5–5)
POTASSIUM SERPL-SCNC: 3.4 MMOL/L — LOW (ref 3.5–5)
POTASSIUM SERPL-SCNC: 3.8 MMOL/L — SIGNIFICANT CHANGE UP (ref 3.5–5)
PROT SERPL-MCNC: 6.2 G/DL — SIGNIFICANT CHANGE UP (ref 6–8)
RBC # BLD: 5.92 M/UL — HIGH (ref 4.2–5.4)
RBC # FLD: 19.3 % — HIGH (ref 11.5–14.5)
SODIUM SERPL-SCNC: 136 MMOL/L — SIGNIFICANT CHANGE UP (ref 135–146)
SODIUM SERPL-SCNC: 136 MMOL/L — SIGNIFICANT CHANGE UP (ref 135–146)
WBC # BLD: 7.23 K/UL — SIGNIFICANT CHANGE UP (ref 4.8–10.8)
WBC # FLD AUTO: 7.23 K/UL — SIGNIFICANT CHANGE UP (ref 4.8–10.8)

## 2020-05-05 PROCEDURE — 99233 SBSQ HOSP IP/OBS HIGH 50: CPT

## 2020-05-05 RX ORDER — VANCOMYCIN HCL 1 G
1 VIAL (EA) INTRAVENOUS
Qty: 0 | Refills: 0 | DISCHARGE
Start: 2020-05-05 | End: 2020-05-10

## 2020-05-05 RX ORDER — METOPROLOL TARTRATE 50 MG
1 TABLET ORAL
Qty: 0 | Refills: 0 | DISCHARGE
Start: 2020-05-05

## 2020-05-05 RX ORDER — POTASSIUM CHLORIDE 20 MEQ
40 PACKET (EA) ORAL ONCE
Refills: 0 | Status: COMPLETED | OUTPATIENT
Start: 2020-05-05 | End: 2020-05-05

## 2020-05-05 RX ORDER — FUROSEMIDE 40 MG
1 TABLET ORAL
Qty: 0 | Refills: 0 | DISCHARGE
Start: 2020-05-05

## 2020-05-05 RX ORDER — ALBUTEROL 90 UG/1
2 AEROSOL, METERED ORAL
Qty: 0 | Refills: 0 | DISCHARGE
Start: 2020-05-05

## 2020-05-05 RX ADMIN — Medication 125 MILLIGRAM(S): at 06:30

## 2020-05-05 RX ADMIN — NYSTATIN CREAM 1 APPLICATION(S): 100000 CREAM TOPICAL at 06:31

## 2020-05-05 RX ADMIN — PANTOPRAZOLE SODIUM 40 MILLIGRAM(S): 20 TABLET, DELAYED RELEASE ORAL at 17:16

## 2020-05-05 RX ADMIN — RIVAROXABAN 15 MILLIGRAM(S): KIT at 17:17

## 2020-05-05 RX ADMIN — NYSTATIN CREAM 1 APPLICATION(S): 100000 CREAM TOPICAL at 12:16

## 2020-05-05 RX ADMIN — Medication 20 MILLIGRAM(S): at 06:30

## 2020-05-05 RX ADMIN — PANTOPRAZOLE SODIUM 40 MILLIGRAM(S): 20 TABLET, DELAYED RELEASE ORAL at 06:31

## 2020-05-05 RX ADMIN — Medication 125 MILLIGRAM(S): at 01:24

## 2020-05-05 RX ADMIN — Medication 1 TABLET(S): at 12:16

## 2020-05-05 RX ADMIN — Medication 100 MILLIGRAM(S): at 06:31

## 2020-05-05 RX ADMIN — NYSTATIN CREAM 1 APPLICATION(S): 100000 CREAM TOPICAL at 21:51

## 2020-05-05 RX ADMIN — Medication 125 MILLIGRAM(S): at 12:17

## 2020-05-05 RX ADMIN — Medication 125 MILLIGRAM(S): at 23:00

## 2020-05-05 RX ADMIN — Medication 40 MILLIEQUIVALENT(S): at 09:27

## 2020-05-05 RX ADMIN — Medication 125 MILLIGRAM(S): at 17:17

## 2020-05-05 RX ADMIN — Medication 100 MILLIGRAM(S): at 17:17

## 2020-05-05 RX ADMIN — Medication 81 MILLIGRAM(S): at 12:17

## 2020-05-05 NOTE — PROGRESS NOTE ADULT - SUBJECTIVE AND OBJECTIVE BOX
NEPHROLOGY FOLLOW UP NOTE    pt seen and examined  cr stable  on po lasix  given diamox  comfortable  off O2  + void  jacob MN'ed   still weak and poorly ambulatory    PAST MEDICAL & SURGICAL HISTORY:  History of stomach cancer: 25 years ago-- had stomach surgery  Hyperlipidemia  CAD S/P percutaneous coronary angioplasty  Atrial fibrillation  HTN (hypertension)  Diverticulitis  S/P small bowel resection  H/O abdominal hysterectomy    Allergies:  penicillin (Anaphylaxis)  penicillin (Unknown)    Home Medications Reviewed    SOCIAL HISTORY:  Denies ETOH,Smoking,   FAMILY HISTORY:  No pertinent family history in first degree relatives        REVIEW OF SYSTEMS:  CONSTITUTIONAL: + weakness, no fevers or chills  EYES/ENT: No visual changes;  No vertigo or throat pain   NECK: No pain or stiffness  RESPIRATORY: No cough, wheezing, hemoptysis; No shortness of breath  CARDIOVASCULAR: No chest pain or palpitations.  GASTROINTESTINAL: No abdominal or epigastric pain. No nausea, vomiting, or hematemesis; No diarrhea or constipation. No melena or hematochezia.  GENITOURINARY: No dysuria, frequency, foamy urine, urinary urgency, incontinence or hematuria  NEUROLOGICAL: No numbness or weakness  SKIN: No itching, burning, rashes, or lesions   VASCULAR: No bilateral lower extremity edema.   All other review of systems is negative unless indicated above.    PHYSICAL EXAM:  Constitutional: NAD  HEENT: anicteric sclera, oropharynx clear, MMM  Neck: No JVD  Respiratory: CTAB, no wheezes, rales or rhonchi  Cardiovascular: S1, S2, RRR  Gastrointestinal: BS+, soft, NT/ND  Extremities: No cyanosis or clubbing. No peripheral edema  Neurological: A/O x 3, no focal deficits  Psychiatric: Normal mood, normal affect  : No CVA tenderness  Skin: No rashes    Hospital Medications:   MEDICATIONS  (STANDING):  aspirin  chewable 81 milliGRAM(s) Oral daily  furosemide    Tablet 20 milliGRAM(s) Oral daily  metoprolol tartrate 100 milliGRAM(s) Oral two times a day  multivitamin 1 Tablet(s) Oral daily  nystatin Powder 1 Application(s) Topical three times a day  pantoprazole    Tablet 40 milliGRAM(s) Oral two times a day  rivaroxaban 15 milliGRAM(s) Oral with dinner  vancomycin    Solution 125 milliGRAM(s) Oral every 6 hours        VITALS:  T(F): 96.3 (20 @ 05:32), Max: 97.7 (20 @ 21:02)  HR: 92 (20 @ 05:32)  BP: 138/76 (20 @ 05:32)  RR: 18 (20 @ 05:32)  SpO2: 92% (20 @ 05:32)  Wt(kg): --     @ 07:01  -   @ 07:00  --------------------------------------------------------  IN: 0 mL / OUT: 1350 mL / NET: -1350 mL     @ 07:01  -   @ 07:00  --------------------------------------------------------  IN: 245 mL / OUT: 201 mL / NET: 44 mL     @ 07:01  -   @ 13:32  --------------------------------------------------------  IN: 0 mL / OUT: 1 mL / NET: -1 mL          LABS:      136  |  89<L>  |  19  ----------------------------<  109<H>  3.8   |  34<H>  |  1.6<H>    Ca    9.2      05 May 2020 11:53  Mg     2.6         TPro  6.2  /  Alb  3.3<L>  /  TBili  1.4<H>  /  DBili      /  AST  30  /  ALT  27  /  AlkPhos  184<H>  05                          15.1   7.23  )-----------( 203      ( 05 May 2020 06:53 )             48.9       Urine Studies:  Urinalysis Basic - ( 2020 13:47 )    Color: Light Yellow / Appearance: Slightly Turbid / S.008 / pH:   Gluc:  / Ketone: Negative  / Bili: Negative / Urobili: <2 mg/dL   Blood:  / Protein: Trace / Nitrite: Negative   Leuk Esterase: Large / RBC: 1 /HPF /  /HPF   Sq Epi:  / Non Sq Epi: 0 /HPF / Bacteria: Negative      Protein/Creatinine Ratio Calculation: 0.8 Ratio ( @ 13:47)  Creatinine, Random Urine: 19 mg/dL ( @ 13:47)      RADIOLOGY & ADDITIONAL STUDIES:

## 2020-05-05 NOTE — PROGRESS NOTE ADULT - SUBJECTIVE AND OBJECTIVE BOX
denies diarrhea today  no chest pain  no SOB sittingup in bed   feels better today  jacob dced - voiding     Vital Signs Last 24 Hrs  T(C): 35.7 (05 May 2020 05:32), Max: 36.7 (04 May 2020 13:30)  T(F): 96.3 (05 May 2020 05:32), Max: 98 (04 May 2020 13:30)  HR: 92 (05 May 2020 05:32) (84 - 98)  BP: 138/76 (05 May 2020 05:32) (87/50 - 138/76)  BP(mean): --  RR: 18 (05 May 2020 05:32) (17 - 18)  SpO2: 92% (05 May 2020 05:32) (92% - 92%)      PHYSICAL EXAM:  GENERAL: NAD  Pulm: dec BS at bases no wheeze, rhonchi  CV: Regular rate and rhythm; No murmurs, rubs, or gallops  GI: Soft, Nontender, Nondistended; Bowel sounds present  EXTREMITIES:   edema+  PSYCH: AAOx3  NEUROLOGY: non-focal                        15.1   7.23  )-----------( 203      ( 05 May 2020 06:53 )             48.9   05-05    136  |  89<L>  |  19  ----------------------------<  109<H>  3.8   |  34<H>  |  1.6<H>    Ca    9.2      05 May 2020 11:53  Mg     2.6     05-05    TPro  6.2  /  Alb  3.3<L>  /  TBili  1.4<H>  /  DBili  x   /  AST  30  /  ALT  27  /  AlkPhos  184<H>  05-05  MEDICATIONS  (STANDING):  aspirin  chewable 81 milliGRAM(s) Oral daily  furosemide    Tablet 20 milliGRAM(s) Oral daily  metoprolol tartrate 100 milliGRAM(s) Oral two times a day  multivitamin 1 Tablet(s) Oral daily  nystatin Powder 1 Application(s) Topical three times a day  pantoprazole    Tablet 40 milliGRAM(s) Oral two times a day  rivaroxaban 15 milliGRAM(s) Oral with dinner  vancomycin    Solution 125 milliGRAM(s) Oral every 6 hours    MEDICATIONS  (PRN):  acetaminophen   Tablet .. 650 milliGRAM(s) Oral every 4 hours PRN Temp greater or equal to 38.5C (101.3F)  ALBUTerol    90 MICROgram(s) HFA Inhaler 2 Puff(s) Inhalation every 4 hours PRN Shortness of Breath and/or Wheezing  ondansetron Injectable 4 milliGRAM(s) IV Push every 6 hours PRN Nausea and/or Vomiting

## 2020-05-05 NOTE — PROGRESS NOTE ADULT - ASSESSMENT
VANI   - Cr stable   - no hydro or stones on renal sono   - trace proteinuria + pyuria   baseline Cr 0.7 in 2018  slight contraction alkalosis  acute on chronic HFpEF  right heart failure  EF 45% with mod MR, mod TR, mod pulm HTN, RV overload and R pleural effusion  Cdiff diarrhea  afib with RVR    plan:    cont  po lasix  off ACE-I  fluid restriction  complete po vanco course  contact isolation  physical therapy  dc planning to STR  outpt renal f/u  d/w hospitalist

## 2020-05-05 NOTE — DISCHARGE NOTE PROVIDER - NSDCCPCAREPLAN_GEN_ALL_CORE_FT
PRINCIPAL DISCHARGE DIAGNOSIS  Diagnosis: CHF exacerbation  Assessment and Plan of Treatment: PRINCIPAL DISCHARGE DIAGNOSIS  Diagnosis: CHF exacerbation  Assessment and Plan of Treatment: you had a worsening of heart failure, which caused fluid to back up into your lungs and impaired your breathing. Please continue to take your medications as prescribed and follow up with your primary care provider.      SECONDARY DISCHARGE DIAGNOSES  Diagnosis: Clostridium difficile diarrhea  Assessment and Plan of Treatment: you had some diarrhea because of an overgrowth of a specific type of bacteria in your large intestines. Please continue to take your oral antibiotics, with your last dose scheduled on 5/10/2020.

## 2020-05-05 NOTE — DISCHARGE NOTE PROVIDER - NSDCMRMEDTOKEN_GEN_ALL_CORE_FT
aspirin 81 mg oral tablet, chewable: 1 tab(s) orally once a day  metoprolol succinate 50 mg oral tablet, extended release: 1 tab(s) orally once a day  pantoprazole 40 mg oral delayed release tablet: 1 tab(s) orally 2 times a day   Xarelto: 20 milligram(s) orally once a day albuterol 90 mcg/inh inhalation aerosol: 2 puff(s) inhaled every 4 hours, As needed, Shortness of Breath and/or Wheezing  aspirin 81 mg oral tablet, chewable: 1 tab(s) orally once a day  furosemide 20 mg oral tablet: 1 tab(s) orally once a day  metoprolol tartrate 100 mg oral tablet: 1 tab(s) orally 2 times a day  Multiple Vitamins oral tablet: 1 tab(s) orally once a day  pantoprazole 40 mg oral delayed release tablet: 1 tab(s) orally 2 times a day   vancomycin 125 mg oral capsule: 1 cap(s) orally every 6 hours until 5/10/2020  Xarelto: 20 milligram(s) orally once a day

## 2020-05-05 NOTE — DISCHARGE NOTE PROVIDER - HOSPITAL COURSE
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 pt did not objectively measure temp at home. But did endorse body aches and general lethargy a/w poor po intake. She noticed some palpitations today which prompted her to come to [facility] ED. She lives w/ her daughter and denies any sick contacts and was compliant w/ social isolation and mostly stayed at home. Denies any N/V/D, orthopnea, PND, dysuria, frequency, urgency, or confusion.         In the ED, vitals:  irregular and Tmax 100.7. Was given x1 azithro and rocephin x1. lasix 40 x1 and Cardizem 10 IV push which rate controlled her at 110 bpm.        Acute hypoxic respiratory failure likely fluid overload with likely acute on chronic HFrEF exacerbation / right sided heart failure with mod. pulm. HTN- COVID neg    - c/w NC O2 to maintain SPO2 92-94%/ BIPAP PRN/HS.    - received lasix 40mg IV ~1PM, ordered daily for 12PM    - Monitor I&O's/ daily weights/ monitor electrolytes/ maintain K>4/ mag >2    - fluid restriction to 1.5L/day    - c/w lopressor/ xarelto.     - albuterol prn for sob.    - TTE noted: EF of 45-50% with mildly dec LVSF/ RV volume and pressure overload/ mod. MR/TR/mod pleural effusion/mod. pulm. HTN    - Cardio consult appreciated- recs followed.     - Bilateral LE doppler neg for dvt.     - nephro consult appreciated    -chest xray still with effusions right side - not improved after diuresis - pending pulmonary for thoracentesis    -per pulm, no thoracocentesis for now    -c/w IV lasix 20 qday, will consider switch to po    -repeat CXR 5/4 : Stable right basilar opacity/effusion. No pneumothorax    -per pulm: Check ABG, if ph>7.5 consider acetazolamide        # A-FIB with RVR- remains stable ~100    - switched to metoprolol 100 BID today, 4/29 PM    - c/w lopressor/ xarelto.     -HR better controlled on lopressor 100 q12h     -HOLD  xarelto 15 qday for potential thoracenteses -restart if no plan for tap        # Diarrhea due to C-diff colitis:     - started on oral vanco 4/27, c/w po vanco total of 14 days, (final dose due on 5/10)    - contact isolation        # VANI on CKD stage 3 with HAGMA- pre-renal with diuresis and diarrhea:     - received lasix 40mg IV ~1PM, was ordered daily for 12PM    - currently on lasix 20 mg IV daily    - monitor I&O's strictly    - nephro consult appreciated        # HTN- stable     - switched to metoprolol 100 BID 4/29 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 pt did not objectively measure temp at home. But did endorse body aches and general lethargy a/w poor po intake. She noticed some palpitations today which prompted her to come to [facility] ED. She lives w/ her daughter and denies any sick contacts and was compliant w/ social isolation and mostly stayed at home. Denies any N/V/D, orthopnea, PND, dysuria, frequency, urgency, or confusion.        While in the ED, patient's vitals were:  irregular and Tmax 100.7. Was given x1 azithro and rocephin x1. lasix 40 x1 and Cardizem 10 IV push which rate controlled her at 110 bpm.        Acute hypoxic respiratory failure likely fluid overload with likely acute on chronic HFrEF exacerbation / right sided heart failure with mod. pulm. HTN- COVID neg    - c/w NC O2 to maintain SPO2 92-94%/ BIPAP PRN/HS.    - received lasix 40mg IV ~1PM, ordered daily for 12PM    - Monitor I&O's/ daily weights/ monitor electrolytes/ maintain K>4/ mag >2    - fluid restriction to 1.5L/day    - c/w lopressor/ xarelto.     - albuterol prn for sob.    - TTE noted: EF of 45-50% with mildly dec LVSF/ RV volume and pressure overload/ mod. MR/TR/mod pleural effusion/mod. pulm. HTN    - Cardio consult appreciated- recs followed.     - Bilateral LE doppler neg for dvt.     - nephro consult appreciated    -chest xray still with effusions right side - not improved after diuresis - pending pulmonary for thoracentesis    -per pulm, no thoracocentesis for now    -c/w IV lasix 20 qday, will consider switch to po    -repeat CXR 5/4 : Stable right basilar opacity/effusion. No pneumothorax    -per pulm: Check ABG, if ph>7.5 consider acetazolamide        # A-FIB with RVR- remains stable ~100    - switched to metoprolol 100 BID today, 4/29 PM    - c/w lopressor/ xarelto.     -HR better controlled on lopressor 100 q12h     -HOLD  xarelto 15 qday for potential thoracenteses -restart if no plan for tap        # Diarrhea due to C-diff colitis:     - started on oral vanco 4/27, c/w po vanco total of 14 days, (final dose due on 5/10)    - contact isolation        # VANI on CKD stage 3 with HAGMA- pre-renal with diuresis and diarrhea:     - received lasix 40mg IV ~1PM, was ordered daily for 12PM    - currently on lasix 20 mg IV daily    - monitor I&O's strictly    - nephro consult appreciated        # HTN- stable     - switched to metoprolol 100 BID 4/29

## 2020-05-05 NOTE — PROGRESS NOTE ADULT - ASSESSMENT
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    Acute hypoxic respiratory failure likely fluid overload with likely acute on chronic HFrEF exacerbation / right sided heart failure with mod. pulm. HTN- COVID neg  - c/w NC O2 to maintain SPO2 92-94%/ BIPAP PRN/HS.  - currently on lasix 20mg po daily  - Monitor I&O's/ daily weights/ monitor electrolytes/ maintain K>4/ mag >2  - fluid restriction to 1.5L/day  - c/w lopressor/ xarelto.   - albuterol prn for sob.  - TTE noted: EF of 45-50% with mildly dec LVSF/ RV volume and pressure overload/ mod. MR/TR/mod pleural effusion/mod. pulm. HTN  - Cardio consult appreciated- recs followed.   - Bilateral LE doppler neg for dvt.   - nephro consult appreciated  -chest xray still with effusions right side - not improved after diuresis - pending pulmonary for thoracentesis  -per pulm, no thoracocentesis for now  -c/w IV lasix 20 qday, will consider switch to po  -repeat CXR 5/4 : Stable right basilar opacity/effusion. No pneumothorax  -per pulm: Check ABG, if ph>7.5 consider acetazolamide    # A-FIB with RVR- remains stable ~100  - switched to metoprolol 100 BID today, 4/29 PM  - c/w lopressor/ xarelto.   -HR better controlled on lopressor 100 q12h   -HOLD  xarelto 15 qday for potential thoracenteses -restart if no plan for tap    # Diarrhea due to C-diff colitis:   - started on oral vanco 4/27, c/w po vanco total of 14 days, (final dose due on 5/10)  - contact isolation    # VANI on CKD stage 3 with HAGMA- pre-renal with diuresis and diarrhea:   - received lasix 40mg IV ~1PM, was ordered daily for 12PM  - currently on lasix 20 mg IV daily  - monitor I&O's strictly  - nephro consult appreciated    # HTN- stable   - switched to metoprolol 100 BID today, 4/29 PM      DVT ppx: sq heparin while xarelto on hold   GI ppx: protonix  Progress note handoff:   pending: CHF to improve resolution  disposition: snf 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    Acute hypoxic respiratory failure likely fluid overload with likely acute on chronic HFrEF exacerbation / right sided heart failure with mod. pulm. HTN- COVID neg  - c/w NC O2 to maintain SPO2 92-94%/ BIPAP PRN/HS.  - currently on lasix 20mg po daily  - Monitor I&O's/ daily weights/ monitor electrolytes/ maintain K>4/ mag >2  - fluid restriction to 1.5L/day  - c/w lopressor/ xarelto.   - albuterol prn for sob.  - TTE noted: EF of 45-50% with mildly dec LVSF/ RV volume and pressure overload/ mod. MR/TR/mod pleural effusion/mod. pulm. HTN  - Cardio consult appreciated- recs followed.   - Bilateral LE doppler neg for dvt.   - nephro consult appreciated  -chest xray still with effusions right side - not improved after diuresis - pending pulmonary for thoracentesis  -per pulm, no thoracocentesis for now  -c/w IV lasix 20 qday, will consider switch to po  -repeat CXR 5/4 : Stable right basilar opacity/effusion. No pneumothorax  -received acetazolamide x1 yesterday    # A-FIB with RVR- remains stable ~100  - switched to metoprolol 100 BID on 4/29  - c/w lopressor/ xarelto  -HR better controlled on lopressor 100 q12h  -resumed xarelto 15mg qday, was held for potential thoracentesis, was restarted as no plan for tap    # Diarrhea due to C-diff colitis:   - started on oral vanco 4/27, c/w po vanco total of 14 days, (final dose due on 5/10)  - contact isolation    # AVNI on CKD stage 3 with HAGMA- pre-renal with diuresis and diarrhea:   - received lasix 40mg IV ~1PM, was ordered daily for 12PM  - currently on lasix 20 mg IV daily  - monitor I&O's strictly  - nephro consult appreciated    # HTN- stable   - switched to metoprolol 100 BID today, 4/29 PM    DVT ppx: xarelto  GI ppx: protonix  Progress note handoff:   pending: CHF resolution  disposition: snf 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    Acute hypoxic respiratory failure likely fluid overload with likely acute on chronic HFrEF exacerbation / right sided heart failure with mod. pulm. HTN- COVID neg  - satting 95-95% on RA  - currently on lasix 20mg po daily  - Monitor I&O's/ daily weights/ monitor electrolytes/ maintain K>4/ mag >2  - fluid restriction to 1.5L/day  - c/w lopressor/ xarelto.   - albuterol prn for sob.  - TTE noted: EF of 45-50% with mildly dec LVSF/ RV volume and pressure overload/ mod. MR/TR/mod pleural effusion/mod. pulm. HTN  - Cardio consult appreciated- recs followed.   - Bilateral LE doppler neg for dvt.   - nephro consult appreciated  -chest xray still with effusions right side - not improved after diuresis - pending pulmonary for thoracentesis  -per pulm, no thoracocentesis for now  -c/w IV lasix 20 qday, will consider switch to po  -repeat CXR 5/4 : Stable right basilar opacity/effusion. No pneumothorax  -received acetazolamide x1 yesterday    # A-FIB with RVR- remains stable ~100  - switched to metoprolol 100 BID on 4/29  - c/w lopressor/ xarelto  -HR better controlled on lopressor 100 q12h  -resumed xarelto 15mg qday, was held for potential thoracentesis, was restarted as no plan for tap    # Diarrhea due to C-diff colitis:   - started on oral vanco 4/27, c/w po vanco total of 14 days, (final dose due on 5/10)  - contact isolation    # VANI on CKD stage 3 with HAGMA- pre-renal with diuresis and diarrhea:   - received lasix 40mg IV ~1PM, was ordered daily for 12PM  - currently on lasix 20 mg IV daily  - monitor I&O's strictly  - nephro consult appreciated    # HTN- stable   - switched to metoprolol 100 BID today, 4/29 PM    DVT ppx: xarelto  GI ppx: protonix  Progress note handoff:   pending: CHF resolution  disposition: snf

## 2020-05-05 NOTE — DISCHARGE NOTE PROVIDER - NSFOLLOWUPCLINICS_GEN_ALL_ED_FT
[facility] [address] [address]  Phone: [phone]  Fax:   Follow Up Time: Routine

## 2020-05-05 NOTE — PROGRESS NOTE ADULT - SUBJECTIVE AND OBJECTIVE BOX
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 pt did not objectively measure temp at home. But did endorse body aches and general lethargy a/w poor po intake. She noticed some palpitations today which prompted her to come to [facility] ED. She lives w/ her daughter and denies any sick contacts and was compliant w/ social isolation and mostly stayed at home. Denies any N/V/D, orthopnea, PND, dysuria, frequency, urgency, or confusion.     In the ED, vitals:  irregular and Tmax 100.7. Was given x1 azithro and rocephin x1. lasix 40 x1 and Cardizem 10 IV push which rate controlled her at 110 bpm. (25 Apr 2020 10:39)    Currently admitted to medicine with the primary diagnosis of CHF exacerbation     Today is hospital day 10d.     INTERVAL HPI / OVERNIGHT EVENTS:  Patient was examined and seen at bedside. This morning she is resting comfortably in bed and reports no new issues or overnight events.     ROS: Otherwise unremarkable     PAST MEDICAL & SURGICAL HISTORY  History of stomach cancer: 25 years ago-- had stomach surgery  Hyperlipidemia  CAD S/P percutaneous coronary angioplasty  Atrial fibrillation  HTN (hypertension)  Diverticulitis  S/P small bowel resection  H/O abdominal hysterectomy    ALLERGIES  penicillin (Anaphylaxis)  penicillin (Unknown)    MEDICATIONS  STANDING MEDICATIONS  aspirin  chewable 81 milliGRAM(s) Oral daily  furosemide    Tablet 20 milliGRAM(s) Oral daily  metoprolol tartrate 100 milliGRAM(s) Oral two times a day  multivitamin 1 Tablet(s) Oral daily  nystatin Powder 1 Application(s) Topical three times a day  pantoprazole    Tablet 40 milliGRAM(s) Oral two times a day  rivaroxaban 15 milliGRAM(s) Oral with dinner  vancomycin    Solution 125 milliGRAM(s) Oral every 6 hours    PRN MEDICATIONS  acetaminophen   Tablet .. 650 milliGRAM(s) Oral every 4 hours PRN  ALBUTerol    90 MICROgram(s) HFA Inhaler 2 Puff(s) Inhalation every 4 hours PRN  ondansetron Injectable 4 milliGRAM(s) IV Push every 6 hours PRN    VITALS:  T(F): 96.3  HR: 92  BP: 138/76  RR: 18  SpO2: 92%    PHYSICAL EXAM  GEN: NAD, Resting comfortably in bed  PULM: Clear to auscultation bilaterally, No wheezes  CVS: Regular rate and rhythm, S1-S2, no murmurs  ABD: Soft, non-tender, non-distended, no guarding  EXT: No edema  NEURO: A&Ox3, no focal deficits    LABS                        15.1   7.23  )-----------( 203      ( 05 May 2020 06:53 )             48.9     05-05    136  |  87<L>  |  17  ----------------------------<  107<H>  3.4<L>   |  36<H>  |  1.5    Ca    9.0      05 May 2020 06:53  Mg     2.6     05-05    TPro  6.2  /  Alb  3.3<L>  /  TBili  1.4<H>  /  DBili  x   /  AST  30  /  ALT  27  /  AlkPhos  184<H>  05-05    PT/INR - ( 04 May 2020 06:18 )   PT: 13.30 sec;   INR: 1.16 ratio         PTT - ( 04 May 2020 06:18 )  PTT:30.6 sec              RADIOLOGY 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 pt did not objectively measure temp at home. But did endorse body aches and general lethargy a/w poor po intake. She noticed some palpitations today which prompted her to come to [facility] ED. She lives w/ her daughter and denies any sick contacts and was compliant w/ social isolation and mostly stayed at home. Denies any N/V/D, orthopnea, PND, dysuria, frequency, urgency, or confusion.     In the ED, vitals:  irregular and Tmax 100.7. Was given x1 azithro and rocephin x1. lasix 40 x1 and Cardizem 10 IV push which rate controlled her at 110 bpm. (25 Apr 2020 10:39)    Currently admitted to medicine with the primary diagnosis of CHF exacerbation     Today is hospital day 10d.     INTERVAL HPI / OVERNIGHT EVENTS:  Patient was examined and seen at bedside. This morning she is resting comfortably in bed and reports no new issues or overnight events. Denies any further SOB, however complains of generalized weakness.    ROS: Otherwise unremarkable     PAST MEDICAL & SURGICAL HISTORY  History of stomach cancer: 25 years ago-- had stomach surgery  Hyperlipidemia  CAD S/P percutaneous coronary angioplasty  Atrial fibrillation  HTN (hypertension)  Diverticulitis  S/P small bowel resection  H/O abdominal hysterectomy    ALLERGIES  penicillin (Anaphylaxis)  penicillin (Unknown)    MEDICATIONS  STANDING MEDICATIONS  aspirin  chewable 81 milliGRAM(s) Oral daily  furosemide    Tablet 20 milliGRAM(s) Oral daily  metoprolol tartrate 100 milliGRAM(s) Oral two times a day  multivitamin 1 Tablet(s) Oral daily  nystatin Powder 1 Application(s) Topical three times a day  pantoprazole    Tablet 40 milliGRAM(s) Oral two times a day  rivaroxaban 15 milliGRAM(s) Oral with dinner  vancomycin    Solution 125 milliGRAM(s) Oral every 6 hours    PRN MEDICATIONS  acetaminophen   Tablet .. 650 milliGRAM(s) Oral every 4 hours PRN  ALBUTerol    90 MICROgram(s) HFA Inhaler 2 Puff(s) Inhalation every 4 hours PRN  ondansetron Injectable 4 milliGRAM(s) IV Push every 6 hours PRN    VITALS:  T(F): 96.3  HR: 92  BP: 138/76  RR: 18  SpO2: 92%    PHYSICAL EXAM  GEN: NAD, Resting comfortably in bed  PULM: mild basilar crackles, No wheezes  CVS: Regular rate and rhythm, S1-S2, no murmurs  ABD: Soft, non-tender, non-distended, no guarding  EXT: No edema  NEURO: A&Ox3, no focal deficits    LABS                        15.1   7.23  )-----------( 203      ( 05 May 2020 06:53 )             48.9     05-05    136  |  87<L>  |  17  ----------------------------<  107<H>  3.4<L>   |  36<H>  |  1.5    Ca    9.0      05 May 2020 06:53  Mg     2.6     05-05    TPro  6.2  /  Alb  3.3<L>  /  TBili  1.4<H>  /  DBili  x   /  AST  30  /  ALT  27  /  AlkPhos  184<H>  05-05    PT/INR - ( 04 May 2020 06:18 )   PT: 13.30 sec;   INR: 1.16 ratio         PTT - ( 04 May 2020 06:18 )  PTT:30.6 sec 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 pt did not objectively measure temp at home. But did endorse body aches and general lethargy a/w poor po intake. She noticed some palpitations today which prompted her to come to [facility] ED. She lives w/ her daughter and denies any sick contacts and was compliant w/ social isolation and mostly stayed at home. Denies any N/V/D, orthopnea, PND, dysuria, frequency, urgency, or confusion.  While in the ED, patient's vitals were:  irregular and Tmax 100.7. Was given x1 azithro and rocephin x1. lasix 40 x1 and Cardizem 10 IV push which rate controlled her at 110 bpm.    Currently admitted to medicine with the primary diagnosis of CHF exacerbation     Today is hospital day 10d.     INTERVAL HPI / OVERNIGHT EVENTS:  Patient was examined and seen at bedside. This morning she is resting comfortably in bed and reports no new issues or overnight events. Denies any further SOB, however complains of some generalized weakness.    ROS: Otherwise unremarkable     PAST MEDICAL & SURGICAL HISTORY  History of stomach cancer: 25 years ago-- had stomach surgery  Hyperlipidemia  CAD S/P percutaneous coronary angioplasty  Atrial fibrillation  HTN (hypertension)  Diverticulitis  S/P small bowel resection  H/O abdominal hysterectomy    ALLERGIES  penicillin (Anaphylaxis)  penicillin (Unknown)    MEDICATIONS  STANDING MEDICATIONS  aspirin  chewable 81 milliGRAM(s) Oral daily  furosemide    Tablet 20 milliGRAM(s) Oral daily  metoprolol tartrate 100 milliGRAM(s) Oral two times a day  multivitamin 1 Tablet(s) Oral daily  nystatin Powder 1 Application(s) Topical three times a day  pantoprazole    Tablet 40 milliGRAM(s) Oral two times a day  rivaroxaban 15 milliGRAM(s) Oral with dinner  vancomycin    Solution 125 milliGRAM(s) Oral every 6 hours    PRN MEDICATIONS  acetaminophen   Tablet .. 650 milliGRAM(s) Oral every 4 hours PRN  ALBUTerol    90 MICROgram(s) HFA Inhaler 2 Puff(s) Inhalation every 4 hours PRN  ondansetron Injectable 4 milliGRAM(s) IV Push every 6 hours PRN    VITALS:  T(F): 96.3  HR: 92  BP: 138/76  RR: 18  SpO2: 92%    PHYSICAL EXAM  GEN: NAD, Resting comfortably in bed  PULM: mild basilar crackles, No wheezes  CVS: Regular rate and rhythm, S1-S2, no murmurs  ABD: Soft, non-tender, non-distended, no guarding  EXT: No edema  NEURO: A&Ox3, no focal deficits    LABS                        15.1   7.23  )-----------( 203      ( 05 May 2020 06:53 )             48.9     05-05    136  |  87<L>  |  17  ----------------------------<  107<H>  3.4<L>   |  36<H>  |  1.5    Ca    9.0      05 May 2020 06:53  Mg     2.6     05-05    TPro  6.2  /  Alb  3.3<L>  /  TBili  1.4<H>  /  DBili  x   /  AST  30  /  ALT  27  /  AlkPhos  184<H>  05-05    PT/INR - ( 04 May 2020 06:18 )   PT: 13.30 sec;   INR: 1.16 ratio         PTT - ( 04 May 2020 06:18 )  PTT:30.6 sec

## 2020-05-05 NOTE — PROGRESS NOTE ADULT - ASSESSMENT
#acute on chronic diastolic chf: improving   chest xray still with effusions right side -slightly improved - but clincially improved off O2  seen by pulm - no thoracentesis for now - nonloculated  lowered lasix to 20mg po, sp diamox      #contraction alkalosis due to lasix - decreased lasix, sp diamox      #A-FIB   HR better controlled on lopressor 100 q12h   restarted on xarelto 15 qday    #Diarrhea due to C-diff colitis: monitor electrolytes   c/w po vanco total of 14 days     #VANI on CKD stage 3 - stable    #HTN: controlled         #dvt ppx - xarelto         Progress note handoff:   medically stable for dc  plan is for SNF  discussed with daughter yesterday  follow up with CM team for placement

## 2020-05-06 ENCOUNTER — TRANSCRIPTION ENCOUNTER (OUTPATIENT)
Age: 80
End: 2020-05-06

## 2020-05-06 VITALS
SYSTOLIC BLOOD PRESSURE: 111 MMHG | HEART RATE: 88 BPM | TEMPERATURE: 97 F | RESPIRATION RATE: 18 BRPM | DIASTOLIC BLOOD PRESSURE: 59 MMHG | OXYGEN SATURATION: 96 %

## 2020-05-06 LAB
ALBUMIN SERPL ELPH-MCNC: 3.5 G/DL — SIGNIFICANT CHANGE UP (ref 3.5–5.2)
ALP SERPL-CCNC: 182 U/L — HIGH (ref 30–115)
ALT FLD-CCNC: 28 U/L — SIGNIFICANT CHANGE UP (ref 0–41)
ANION GAP SERPL CALC-SCNC: 16 MMOL/L — HIGH (ref 7–14)
AST SERPL-CCNC: 38 U/L — SIGNIFICANT CHANGE UP (ref 0–41)
BILIRUB SERPL-MCNC: 0.9 MG/DL — SIGNIFICANT CHANGE UP (ref 0.2–1.2)
BUN SERPL-MCNC: 19 MG/DL — SIGNIFICANT CHANGE UP (ref 10–20)
CALCIUM SERPL-MCNC: 9.5 MG/DL — SIGNIFICANT CHANGE UP (ref 8.5–10.1)
CHLORIDE SERPL-SCNC: 92 MMOL/L — LOW (ref 98–110)
CO2 SERPL-SCNC: 30 MMOL/L — SIGNIFICANT CHANGE UP (ref 17–32)
CREAT SERPL-MCNC: 1.7 MG/DL — HIGH (ref 0.7–1.5)
GLUCOSE SERPL-MCNC: 163 MG/DL — HIGH (ref 70–99)
MAGNESIUM SERPL-MCNC: 2.5 MG/DL — HIGH (ref 1.8–2.4)
POTASSIUM SERPL-MCNC: 4.4 MMOL/L — SIGNIFICANT CHANGE UP (ref 3.5–5)
POTASSIUM SERPL-SCNC: 4.4 MMOL/L — SIGNIFICANT CHANGE UP (ref 3.5–5)
PROT SERPL-MCNC: 6.4 G/DL — SIGNIFICANT CHANGE UP (ref 6–8)
SODIUM SERPL-SCNC: 138 MMOL/L — SIGNIFICANT CHANGE UP (ref 135–146)

## 2020-05-06 PROCEDURE — 99239 HOSP IP/OBS DSCHRG MGMT >30: CPT

## 2020-05-06 RX ADMIN — Medication 125 MILLIGRAM(S): at 05:38

## 2020-05-06 RX ADMIN — Medication 20 MILLIGRAM(S): at 05:38

## 2020-05-06 RX ADMIN — NYSTATIN CREAM 1 APPLICATION(S): 100000 CREAM TOPICAL at 05:38

## 2020-05-06 RX ADMIN — PANTOPRAZOLE SODIUM 40 MILLIGRAM(S): 20 TABLET, DELAYED RELEASE ORAL at 05:39

## 2020-05-06 RX ADMIN — Medication 81 MILLIGRAM(S): at 11:46

## 2020-05-06 RX ADMIN — Medication 1 TABLET(S): at 11:46

## 2020-05-06 RX ADMIN — Medication 125 MILLIGRAM(S): at 11:46

## 2020-05-06 RX ADMIN — NYSTATIN CREAM 1 APPLICATION(S): 100000 CREAM TOPICAL at 14:42

## 2020-05-06 RX ADMIN — Medication 100 MILLIGRAM(S): at 05:38

## 2020-05-06 NOTE — DISCHARGE NOTE NURSING/CASE MANAGEMENT/SOCIAL WORK - PATIENT PORTAL LINK FT
You can access the FollowMyHealth Patient Portal offered by [facility] by registering at the following website: [email or web address]. By joining [employer]’s FollowMyHealth portal, you will also be able to view your health information using other applications (apps) compatible with our system.

## 2020-05-06 NOTE — PROGRESS NOTE ADULT - ATTENDING COMMENTS
patient seen and examined , agree with pgy 1 assesment and plan except as indicated above,   GEN Lying in no acute distress  HEENT Pupils equal and reactive to light and accommodationSupple Neck  PULM Clear to auscultation bilaterally  CV s1s2 regular rate and rhythm  GI + bowel sounds nontnender  EXT no cyanosis or edema  PSYCH awake alert and oriented x 3  INTEG No Lesions  NEURO MURGUIA    #moderate pulmonary hypertension.    #. Moderate mitral valve regurgitation.    #. Moderate tricuspid regurgitation.    Progress Note Handoff    Pending:  Transfer to snf today,    Family discussion: discussed with joseph at length , all questions answered in regards to eating habits , reviewed all problems     Disposition: SNF

## 2020-05-06 NOTE — PROGRESS NOTE ADULT - REASON FOR ADMISSION

## 2020-05-06 NOTE — PROGRESS NOTE ADULT - SUBJECTIVE AND OBJECTIVE BOX
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 pt did not objectively measure temp at home. But did endorse body aches and general lethargy a/w poor po intake. She noticed some palpitations today which prompted her to come to [facility] ED. She lives w/ her daughter and denies any sick contacts and was compliant w/ social isolation and mostly stayed at home. Denies any N/V/D, orthopnea, PND, dysuria, frequency, urgency, or confusion.     In the ED, vitals:  irregular and Tmax 100.7. Was given x1 azithro and rocephin x1. lasix 40 x1 and Cardizem 10 IV push which rate controlled her at 110 bpm. (25 Apr 2020 10:39)    Currently admitted to medicine with the primary diagnosis of CHF exacerbation     Today is hospital day 11d.     INTERVAL HPI / OVERNIGHT EVENTS:  Patient was examined and seen at bedside. This morning she is resting comfortably in bed and reports no new issues or overnight events. Denies any SOB or diarrhea.    ROS: Otherwise unremarkable     PAST MEDICAL & SURGICAL HISTORY  History of stomach cancer: 25 years ago-- had stomach surgery  Hyperlipidemia  CAD S/P percutaneous coronary angioplasty  Atrial fibrillation  HTN (hypertension)  Diverticulitis  S/P small bowel resection  H/O abdominal hysterectomy    ALLERGIES  penicillin (Anaphylaxis)  penicillin (Unknown)    MEDICATIONS  STANDING MEDICATIONS  aspirin  chewable 81 milliGRAM(s) Oral daily  furosemide    Tablet 20 milliGRAM(s) Oral daily  metoprolol tartrate 100 milliGRAM(s) Oral two times a day  multivitamin 1 Tablet(s) Oral daily  nystatin Powder 1 Application(s) Topical three times a day  pantoprazole    Tablet 40 milliGRAM(s) Oral two times a day  rivaroxaban 15 milliGRAM(s) Oral with dinner  vancomycin    Solution 125 milliGRAM(s) Oral every 6 hours    PRN MEDICATIONS  acetaminophen   Tablet .. 650 milliGRAM(s) Oral every 4 hours PRN  ALBUTerol    90 MICROgram(s) HFA Inhaler 2 Puff(s) Inhalation every 4 hours PRN  ondansetron Injectable 4 milliGRAM(s) IV Push every 6 hours PRN    VITALS:  T(F): 96.1  HR: 99  BP: 126/65  RR: 18  SpO2: 96%    PHYSICAL EXAM  GEN: NAD, Resting comfortably in bed  PULM: Clear to auscultation bilaterally, No wheezes  CVS: Regular rate and rhythm, S1-S2, no murmurs  ABD: Soft, non-tender, non-distended, no guarding  EXT: mild b/l LE edema  NEURO: A&Ox3, no focal deficits    LABS                        15.1   7.23  )-----------( 203      ( 05 May 2020 06:53 )             48.9     05-06    138  |  92<L>  |  19  ----------------------------<  163<H>  4.4   |  30  |  1.7<H>    Ca    9.5      06 May 2020 08:21  Mg     2.5     05-06    TPro  6.4  /  Alb  3.5  /  TBili  0.9  /  DBili  x   /  AST  38  /  ALT  28  /  AlkPhos  182<H>  05-06

## 2020-05-06 NOTE — PROGRESS NOTE ADULT - ASSESSMENT
VANI   - Cr stable   - no hydro or stones on renal sono   - trace proteinuria + pyuria   baseline Cr 0.7 in 2018  slight contraction alkalosis  acute on chronic HFpEF  right heart failure  EF 45% with mod MR, mod TR, mod pulm HTN, RV overload and R pleural effusion  Cdiff diarrhea  afib with RVR    plan:    cont  po lasix, may need to increase   off ACE-I  fluid restriction  complete po vanco course  contact isolation  physical therapy  dc planning to STR for today  CHF and CKD education  outpt renal f/u

## 2020-05-06 NOTE — PROGRESS NOTE ADULT - ASSESSMENT
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    Acute hypoxic respiratory failure likely fluid overload with likely acute on chronic HFrEF exacerbation / right sided heart failure with mod. pulm. HTN  - COVID negative  - satting 95-96% on RA  - currently on lasix 20mg po daily  - Monitor I&O's/ daily weights/ monitor electrolytes/ maintain K>4/ mag >2  - fluid restriction to 1.5L/day  - c/w lopressor/ xarelto.   - albuterol prn for sob.  - TTE noted: EF of 45-50% with mildly dec LVSF/ RV volume and pressure overload/ mod. MR/TR/mod pleural effusion/mod. pulm. HTN  - Cardio consult appreciated- recs followed.   - Bilateral LE doppler neg for dvt.   - nephro consult appreciated  -chest xray still with effusions right side  -per pulm, no thoracocentesis for now  -c/w IV lasix 20 qday, will consider switch to po  -repeat CXR 5/4 : Stable right basilar opacity/effusion. No pneumothorax  -received acetazolamide x1 yesterday    # A-FIB with RVR- remains stable ~100  - switched to metoprolol 100 BID on 4/29  - c/w lopressor/ xarelto  -HR better controlled on lopressor 100 q12h  -resumed xarelto 15mg qday, was held for potential thoracentesis, was restarted as no plan for tap    # Diarrhea due to C-diff colitis:   - started on oral vanco 4/27, c/w po vanco total of 14 days, (final dose due on 5/10)  - contact isolation    # VANI on CKD stage 3 with HAGMA- pre-renal with diuresis and diarrhea:   - received lasix 40mg IV ~1PM, was ordered daily for 12PM  - currently on lasix 20 mg IV daily  - monitor I&O's strictly  - nephro consult appreciated    # HTN- stable   - switched to metoprolol 100 BID today, 4/29 PM    DVT ppx: xarelto  GI ppx: protonix  Progress note handoff:   pending: placement  disposition: snf, pending placement

## 2020-05-06 NOTE — PROGRESS NOTE ADULT - SUBJECTIVE AND OBJECTIVE BOX
NEPHROLOGY FOLLOW UP NOTE    pt seen and examined  cr stable  on po lasix  given diamox  comfortable  off O2  + void  jacob SD'ed   still weak and poorly ambulatory    PAST MEDICAL & SURGICAL HISTORY:  History of stomach cancer: 25 years ago-- had stomach surgery  Hyperlipidemia  CAD S/P percutaneous coronary angioplasty  Atrial fibrillation  HTN (hypertension)  Diverticulitis  S/P small bowel resection  H/O abdominal hysterectomy    Allergies:  penicillin (Anaphylaxis)  penicillin (Unknown)    Home Medications Reviewed    SOCIAL HISTORY:  Denies ETOH,Smoking,   FAMILY HISTORY:  No pertinent family history in first degree relatives        REVIEW OF SYSTEMS:  CONSTITUTIONAL: + weakness, no fevers or chills  EYES/ENT: No visual changes;  No vertigo or throat pain   NECK: No pain or stiffness  RESPIRATORY: No cough, wheezing, hemoptysis; No shortness of breath  CARDIOVASCULAR: No chest pain or palpitations.  GASTROINTESTINAL: No abdominal or epigastric pain. No nausea, vomiting, or hematemesis; No diarrhea or constipation. No melena or hematochezia.  GENITOURINARY: No dysuria, frequency, foamy urine, urinary urgency, incontinence or hematuria  NEUROLOGICAL: No numbness or weakness  SKIN: No itching, burning, rashes, or lesions   VASCULAR: No bilateral lower extremity edema.   All other review of systems is negative unless indicated above.    PHYSICAL EXAM:  Constitutional: NAD  HEENT: anicteric sclera, oropharynx clear, MMM  Neck: No JVD  Respiratory: CTAB, no wheezes, rales or rhonchi  Cardiovascular: S1, S2, RRR  Gastrointestinal: BS+, soft, NT/ND  Extremities: No cyanosis or clubbing. No peripheral edema  Neurological: A/O x 3, no focal deficits  Psychiatric: Normal mood, normal affect  : No CVA tenderness  Skin: No rashes    Hospital Medications:   MEDICATIONS  (STANDING):  aspirin  chewable 81 milliGRAM(s) Oral daily  furosemide    Tablet 20 milliGRAM(s) Oral daily  metoprolol tartrate 100 milliGRAM(s) Oral two times a day  multivitamin 1 Tablet(s) Oral daily  nystatin Powder 1 Application(s) Topical three times a day  pantoprazole    Tablet 40 milliGRAM(s) Oral two times a day  rivaroxaban 15 milliGRAM(s) Oral with dinner  vancomycin    Solution 125 milliGRAM(s) Oral every 6 hours        VITALS:  T(F): 97 (05-06-20 @ 13:35), Max: 98.4 (05-05-20 @ 21:12)  HR: 88 (05-06-20 @ 13:35)  BP: 111/59 (05-06-20 @ 13:35)  RR: 18 (05-06-20 @ 13:35)  SpO2: 96% (05-06-20 @ 13:35)  Wt(kg): --    05-04 @ 07:01  -  05-05 @ 07:00  --------------------------------------------------------  IN: 245 mL / OUT: 201 mL / NET: 44 mL    05-05 @ 07:01  -  05-06 @ 07:00  --------------------------------------------------------  IN: 120 mL / OUT: 2 mL / NET: 118 mL    05-06 @ 07:01  -  05-06 @ 14:49  --------------------------------------------------------  IN: 480 mL / OUT: 0 mL / NET: 480 mL          LABS:  05-06    138  |  92<L>  |  19  ----------------------------<  163<H>  4.4   |  30  |  1.7<H>    Ca    9.5      06 May 2020 08:21  Mg     2.5     05-06    TPro  6.4  /  Alb  3.5  /  TBili  0.9  /  DBili      /  AST  38  /  ALT  28  /  AlkPhos  182<H>  05-06                          15.1   7.23  )-----------( 203      ( 05 May 2020 06:53 )             48.9       Urine Studies:        RADIOLOGY & ADDITIONAL STUDIES:

## 2020-05-06 NOTE — DISCHARGE NOTE NURSING/CASE MANAGEMENT/SOCIAL WORK - NSDCPEXARELTO_GEN_ALL_CORE
Pt needs script for walker with seat-hers may have been taken out of her garage and now needs another--patient realizes pcp is gone until tues. And that is ok---and ok if someone else does this. For Denise---   Rivaroxaban/Xarelto - Compliance/Rivaroxaban/Xarelto - Follow up monitoring/Rivaroxaban/Xarelto - Dietary Advice/Rivaroxaban/Xarelto - Potential for adverse drug reactions and interactions

## 2020-05-13 DIAGNOSIS — I27.20 PULMONARY HYPERTENSION, UNSPECIFIED: ICD-10-CM

## 2020-05-13 DIAGNOSIS — R06.02 SHORTNESS OF BREATH: ICD-10-CM

## 2020-05-13 DIAGNOSIS — Z87.891 PERSONAL HISTORY OF NICOTINE DEPENDENCE: ICD-10-CM

## 2020-05-13 DIAGNOSIS — Z95.1 PRESENCE OF AORTOCORONARY BYPASS GRAFT: ICD-10-CM

## 2020-05-13 DIAGNOSIS — I08.1 RHEUMATIC DISORDERS OF BOTH MITRAL AND TRICUSPID VALVES: ICD-10-CM

## 2020-05-13 DIAGNOSIS — I25.10 ATHEROSCLEROTIC HEART DISEASE OF NATIVE CORONARY ARTERY WITHOUT ANGINA PECTORIS: ICD-10-CM

## 2020-05-13 DIAGNOSIS — N17.9 ACUTE KIDNEY FAILURE, UNSPECIFIED: ICD-10-CM

## 2020-05-13 DIAGNOSIS — I50.33 ACUTE ON CHRONIC DIASTOLIC (CONGESTIVE) HEART FAILURE: ICD-10-CM

## 2020-05-13 DIAGNOSIS — I13.0 HYPERTENSIVE HEART AND CHRONIC KIDNEY DISEASE WITH HEART FAILURE AND STAGE 1 THROUGH STAGE 4 CHRONIC KIDNEY DISEASE, OR UNSPECIFIED CHRONIC KIDNEY DISEASE: ICD-10-CM

## 2020-05-13 DIAGNOSIS — C88.4 EXTRANODAL MARGINAL ZONE B-CELL LYMPHOMA OF MUCOSA-ASSOCIATED LYMPHOID TISSUE [MALT-LYMPHOMA]: ICD-10-CM

## 2020-05-13 DIAGNOSIS — E87.2 ACIDOSIS: ICD-10-CM

## 2020-05-13 DIAGNOSIS — Z90.710 ACQUIRED ABSENCE OF BOTH CERVIX AND UTERUS: ICD-10-CM

## 2020-05-13 DIAGNOSIS — E87.3 ALKALOSIS: ICD-10-CM

## 2020-05-13 DIAGNOSIS — J96.01 ACUTE RESPIRATORY FAILURE WITH HYPOXIA: ICD-10-CM

## 2020-05-13 DIAGNOSIS — I48.20 CHRONIC ATRIAL FIBRILLATION, UNSPECIFIED: ICD-10-CM

## 2020-05-13 DIAGNOSIS — I50.813 ACUTE ON CHRONIC RIGHT HEART FAILURE: ICD-10-CM

## 2020-05-13 DIAGNOSIS — A04.72 ENTEROCOLITIS DUE TO CLOSTRIDIUM DIFFICILE, NOT SPECIFIED AS RECURRENT: ICD-10-CM

## 2020-05-13 DIAGNOSIS — Z90.49 ACQUIRED ABSENCE OF OTHER SPECIFIED PARTS OF DIGESTIVE TRACT: ICD-10-CM

## 2020-05-13 DIAGNOSIS — Z95.5 PRESENCE OF CORONARY ANGIOPLASTY IMPLANT AND GRAFT: ICD-10-CM

## 2020-05-13 DIAGNOSIS — N18.3 CHRONIC KIDNEY DISEASE, STAGE 3 (MODERATE): ICD-10-CM

## 2020-05-13 DIAGNOSIS — Z88.0 ALLERGY STATUS TO PENICILLIN: ICD-10-CM

## 2020-11-27 ENCOUNTER — INPATIENT (INPATIENT)
Facility: HOSPITAL | Age: 80
LOS: 5 days | Discharge: HOME | End: 2020-12-03
Attending: INTERNAL MEDICINE | Admitting: INTERNAL MEDICINE
Payer: MEDICARE

## 2020-11-27 VITALS
WEIGHT: 179.9 LBS | OXYGEN SATURATION: 80 % | DIASTOLIC BLOOD PRESSURE: 100 MMHG | SYSTOLIC BLOOD PRESSURE: 175 MMHG | HEART RATE: 150 BPM | RESPIRATION RATE: 24 BRPM | HEIGHT: 66 IN

## 2020-11-27 DIAGNOSIS — Z98.890 OTHER SPECIFIED POSTPROCEDURAL STATES: Chronic | ICD-10-CM

## 2020-11-27 DIAGNOSIS — Z90.49 ACQUIRED ABSENCE OF OTHER SPECIFIED PARTS OF DIGESTIVE TRACT: Chronic | ICD-10-CM

## 2020-11-27 LAB
ALBUMIN SERPL ELPH-MCNC: 4.3 G/DL — SIGNIFICANT CHANGE UP (ref 3.5–5.2)
ALP SERPL-CCNC: 121 U/L — HIGH (ref 30–115)
ALT FLD-CCNC: 35 U/L — SIGNIFICANT CHANGE UP (ref 0–41)
ANION GAP SERPL CALC-SCNC: 24 MMOL/L — HIGH (ref 7–14)
APTT BLD: 27.8 SEC — SIGNIFICANT CHANGE UP (ref 27–39.2)
AST SERPL-CCNC: 45 U/L — HIGH (ref 0–41)
BASE EXCESS BLDV CALC-SCNC: 3.3 MMOL/L — HIGH (ref -2–2)
BASOPHILS # BLD AUTO: 0 K/UL — SIGNIFICANT CHANGE UP (ref 0–0.2)
BASOPHILS NFR BLD AUTO: 0 % — SIGNIFICANT CHANGE UP (ref 0–1)
BILIRUB SERPL-MCNC: 0.3 MG/DL — SIGNIFICANT CHANGE UP (ref 0.2–1.2)
BUN SERPL-MCNC: 59 MG/DL — HIGH (ref 10–20)
CA-I SERPL-SCNC: 1.25 MMOL/L — SIGNIFICANT CHANGE UP (ref 1.12–1.3)
CALCIUM SERPL-MCNC: 10.5 MG/DL — HIGH (ref 8.5–10.1)
CHLORIDE SERPL-SCNC: 99 MMOL/L — SIGNIFICANT CHANGE UP (ref 98–110)
CO2 SERPL-SCNC: 26 MMOL/L — SIGNIFICANT CHANGE UP (ref 17–32)
CREAT SERPL-MCNC: 1.8 MG/DL — HIGH (ref 0.7–1.5)
D DIMER BLD IA.RAPID-MCNC: 3763 NG/ML DDU — HIGH (ref 0–230)
EOSINOPHIL # BLD AUTO: 0 K/UL — SIGNIFICANT CHANGE UP (ref 0–0.7)
EOSINOPHIL NFR BLD AUTO: 0 % — SIGNIFICANT CHANGE UP (ref 0–8)
GAS PNL BLDV: 153 MMOL/L — HIGH (ref 136–145)
GAS PNL BLDV: SIGNIFICANT CHANGE UP
GIANT PLATELETS BLD QL SMEAR: PRESENT — SIGNIFICANT CHANGE UP
GLUCOSE SERPL-MCNC: 122 MG/DL — HIGH (ref 70–99)
HCO3 BLDV-SCNC: 32 MMOL/L — HIGH (ref 22–29)
HCT VFR BLD CALC: 55 % — HIGH (ref 37–47)
HCT VFR BLDA CALC: 53.2 % — HIGH (ref 34–44)
HGB BLD CALC-MCNC: 17.3 G/DL — SIGNIFICANT CHANGE UP (ref 14–18)
HGB BLD-MCNC: 17.3 G/DL — HIGH (ref 12–16)
INR BLD: 1.5 RATIO — HIGH (ref 0.65–1.3)
LACTATE BLDV-MCNC: 6 MMOL/L — HIGH (ref 0.5–1.6)
LYMPHOCYTES # BLD AUTO: 14.9 % — LOW (ref 20.5–51.1)
LYMPHOCYTES # BLD AUTO: 2.13 K/UL — SIGNIFICANT CHANGE UP (ref 1.2–3.4)
MCHC RBC-ENTMCNC: 29.5 PG — SIGNIFICANT CHANGE UP (ref 27–31)
MCHC RBC-ENTMCNC: 31.5 G/DL — LOW (ref 32–37)
MCV RBC AUTO: 93.7 FL — SIGNIFICANT CHANGE UP (ref 81–99)
METAMYELOCYTES # FLD: 0.9 % — HIGH (ref 0–0)
MONOCYTES # BLD AUTO: 0.13 K/UL — SIGNIFICANT CHANGE UP (ref 0.1–0.6)
MONOCYTES NFR BLD AUTO: 0.9 % — LOW (ref 1.7–9.3)
NEUTROPHILS # BLD AUTO: 11.52 K/UL — HIGH (ref 1.4–6.5)
NEUTROPHILS NFR BLD AUTO: 80.7 % — HIGH (ref 42.2–75.2)
NT-PROBNP SERPL-SCNC: HIGH PG/ML (ref 0–300)
PCO2 BLDV: 66 MMHG — HIGH (ref 41–51)
PH BLDV: 7.3 — SIGNIFICANT CHANGE UP (ref 7.26–7.43)
PLAT MORPH BLD: NORMAL — SIGNIFICANT CHANGE UP
PLATELET # BLD AUTO: 303 K/UL — SIGNIFICANT CHANGE UP (ref 130–400)
PO2 BLDV: 29 MMHG — SIGNIFICANT CHANGE UP (ref 20–40)
POIKILOCYTOSIS BLD QL AUTO: SIGNIFICANT CHANGE UP
POLYCHROMASIA BLD QL SMEAR: SLIGHT — SIGNIFICANT CHANGE UP
POTASSIUM BLDV-SCNC: 3.4 MMOL/L — SIGNIFICANT CHANGE UP (ref 3.3–5.6)
POTASSIUM SERPL-MCNC: 3.7 MMOL/L — SIGNIFICANT CHANGE UP (ref 3.5–5)
POTASSIUM SERPL-SCNC: 3.7 MMOL/L — SIGNIFICANT CHANGE UP (ref 3.5–5)
PROMYELOCYTES # FLD: 0.9 % — HIGH (ref 0–0)
PROT SERPL-MCNC: 8.2 G/DL — HIGH (ref 6–8)
PROTHROM AB SERPL-ACNC: 17.3 SEC — HIGH (ref 9.95–12.87)
RAPID RVP RESULT: SIGNIFICANT CHANGE UP
RBC # BLD: 5.87 M/UL — HIGH (ref 4.2–5.4)
RBC # FLD: 17.3 % — HIGH (ref 11.5–14.5)
RBC BLD AUTO: NORMAL — SIGNIFICANT CHANGE UP
ROULEAUX BLD QL SMEAR: PRESENT — SIGNIFICANT CHANGE UP
SAO2 % BLDV: 40 % — SIGNIFICANT CHANGE UP
SARS-COV-2 RNA SPEC QL NAA+PROBE: SIGNIFICANT CHANGE UP
SODIUM SERPL-SCNC: 149 MMOL/L — HIGH (ref 135–146)
TROPONIN T SERPL-MCNC: 0.07 NG/ML — CRITICAL HIGH
VARIANT LYMPHS # BLD: 1.7 % — SIGNIFICANT CHANGE UP (ref 0–5)
WBC # BLD: 14.28 K/UL — HIGH (ref 4.8–10.8)
WBC # FLD AUTO: 14.28 K/UL — HIGH (ref 4.8–10.8)

## 2020-11-27 PROCEDURE — 99285 EMERGENCY DEPT VISIT HI MDM: CPT

## 2020-11-27 PROCEDURE — 71045 X-RAY EXAM CHEST 1 VIEW: CPT | Mod: 26

## 2020-11-27 PROCEDURE — 93010 ELECTROCARDIOGRAM REPORT: CPT

## 2020-11-27 RX ORDER — SODIUM CHLORIDE 9 MG/ML
1000 INJECTION, SOLUTION INTRAVENOUS ONCE
Refills: 0 | Status: COMPLETED | OUTPATIENT
Start: 2020-11-27 | End: 2020-11-27

## 2020-11-27 RX ORDER — NITROGLYCERIN 6.5 MG
0.4 CAPSULE, EXTENDED RELEASE ORAL ONCE
Refills: 0 | Status: DISCONTINUED | OUTPATIENT
Start: 2020-11-27 | End: 2020-11-27

## 2020-11-27 RX ADMIN — SODIUM CHLORIDE 1000 MILLILITER(S): 9 INJECTION, SOLUTION INTRAVENOUS at 21:41

## 2020-11-27 NOTE — ED PROVIDER NOTE - PROGRESS NOTE DETAILS
Patient improved on BIPAP. O2 saturation 100%, patient currently asymptomatic, awake and alert, extremities well perfused and warmer. No B lines, no tamponade/effusion on POCUS. Pt signed out to Dr. Gomez pending labs, CT, dispo. AA: After bipap, extremities warm. Perfusing well. lactated noted. Pt given 1 LR bolus. Will reassess after CT dissection study. US demonstrating no current fluid overload. No signs of R heart strain or RV dilation on bedside echo. SR: s/o received from dr. breaux patient pending ct scans. SR: patient comfortable on bipap. SR: patient endorsed to Dr. Clemente to follow up imaging and reassessment AA: Called CT, pt is next for CT. AA: Pt back from CT. On 10L NRB, speaking full sentences. Pending CT read. Ndubuisi: sign out received from Dr. Gomez  Pt evaluated by me. Presented with acute SOB and hypoxia. Was placed in BiPAP. Labs showed elevated ddimer and BNP, Covid is neg. Pending CTA PE protocol and will manage accordingly.

## 2020-11-27 NOTE — ED PROVIDER NOTE - NS ED ROS FT
General: No fever, chills, or weakness.  Eyes:  No visual changes, eye pain or discharge.  ENMT:  No hearing changes, pain, no sore throat or runny nose, no difficulty swallowing  Cardiac:  No chest pain. No edema.   Respiratory:  Dyspnea. No cough.   GI:  No nausea, vomiting, diarrhea or abdominal pain.  :  No dysuria, frequency or burning.  MS:  No myalgia, muscle weakness, joint pain or back pain.  Neuro:  No headache.  No LOC. No change in ambulation. No dizziness.  Skin:  No skin rash.

## 2020-11-27 NOTE — ED PROVIDER NOTE - CHIEF COMPLAINT
The patient is a 80y Female complaining of chest pain.
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

## 2020-11-27 NOTE — ED PROVIDER NOTE - OBJECTIVE STATEMENT
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 onset yesterday. pt found by family hypoxic and confused. pt denies fever/chills, cough, sore throat, cp, abd pain, n/v/d, lethargy. 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 onset yesterday. pt found by family hypoxic and confused. pt denies fever/chills, cough, sore throat, cp, abd pain, n/v/d, lethargy.

## 2020-11-27 NOTE — ED PROVIDER NOTE - CLINICAL SUMMARY MEDICAL DECISION MAKING FREE TEXT BOX
Pt presented in respiratory distress and hypoxia. Required IV, O2, monitor, labs, imaging. Symptoms improved on Bipap. Will be admitted for further workup.

## 2020-11-27 NOTE — ED PROVIDER NOTE - CARE PLAN
Principal Discharge DX:	CHF exacerbation  Secondary Diagnosis:	Pulmonary hypertension  Secondary Diagnosis:	Atrial fibrillation with RVR

## 2020-11-27 NOTE — ED PROVIDER NOTE - ATTENDING CONTRIBUTION TO CARE
79 yo F pmh cad s/p stents, afib on xarelto pw sob. Chest pain and sob since yesterday. O2 saturation low with ems, started on nonrebreather. Patient endorsing sob upon arrival. No fever/chills, no n/v, no abd pain, no back pain, no trauma, no palpitations.     CONSTITUTIONAL: Awake and alert, mild respiratory distress. Sitting up and providing appropriate history and physical examination  SKIN: skin cool, mottled  HEAD: Normocephalic; atraumatic.  EYES: PERRL, 3 mm bilateral, no nystagmus, EOM intact; conjunctiva and sclera clear.  ENT: No nasal discharge; airway clear.  NECK: Supple; non tender. + full passive ROM in all directions. No JVD  CARD: S1, S2 normal; no murmurs, gallops, or rubs. Regular rate and rhythm. + Symmetric Strong Pulses  RESP: +respiratory distress, started on bipap upon arrival to ED. No wheezes, rales or rhonchi. Good air movement bilaterally  ABD: soft; non-distended; non-tender. No Rebound, No Guarding, No signs of peritonitis, No CVA tenderness. No pulsatile abdominal mass. + Strong and Symmetric Pulses  EXT: Normal ROM. No clubbing, cyanosis or edema. Dp and Pt Pulses intact. Cap refill less than 3 seconds  NEURO: CN 2-12 intact, normal finger to nose, normal romberg, no sensory or motor deficits, Alert, oriented, grossly unremarkable. No Focal deficits. GCS 15. NIH 0  PSYCH: Cooperative, appropriate.

## 2020-11-27 NOTE — ED PROVIDER NOTE - PHYSICAL EXAMINATION
Constitutional: Thin, pale, fatigued.   Head: Atraumatic.  Eyes: PERRLA. EOMI without discomfort.   ENT: No nasal discharge. Dry MM.  Neck: Supple. Painless ROM.  Cardiovascular: Regular rhythm. Regular rate. Normal S1 and S2. No murmurs. 2+ pulses in all extremities.   Pulmonary: Tachypneic to 28. B/l CTAB.   Abdominal: Soft. Nondistended. Nontender. No rebound or guarding.   Extremities: Cold extremities. 1+ edema b/l LE's.   Skin: No rashes.   Neuro: AAOx3. No focal neurological deficits.

## 2020-11-28 LAB
-  COAGULASE NEGATIVE STAPHYLOCOCCUS: SIGNIFICANT CHANGE UP
ALBUMIN SERPL ELPH-MCNC: 3.4 G/DL — LOW (ref 3.5–5.2)
ALP SERPL-CCNC: 95 U/L — SIGNIFICANT CHANGE UP (ref 30–115)
ALT FLD-CCNC: 23 U/L — SIGNIFICANT CHANGE UP (ref 0–41)
ANION GAP SERPL CALC-SCNC: 14 MMOL/L — SIGNIFICANT CHANGE UP (ref 7–14)
AST SERPL-CCNC: 31 U/L — SIGNIFICANT CHANGE UP (ref 0–41)
BASE EXCESS BLDV CALC-SCNC: 5 MMOL/L — HIGH (ref -2–2)
BASOPHILS # BLD AUTO: 0.05 K/UL — SIGNIFICANT CHANGE UP (ref 0–0.2)
BASOPHILS NFR BLD AUTO: 0.4 % — SIGNIFICANT CHANGE UP (ref 0–1)
BILIRUB SERPL-MCNC: 0.4 MG/DL — SIGNIFICANT CHANGE UP (ref 0.2–1.2)
BUN SERPL-MCNC: 46 MG/DL — HIGH (ref 10–20)
CA-I SERPL-SCNC: 1.17 MMOL/L — SIGNIFICANT CHANGE UP (ref 1.12–1.3)
CALCIUM SERPL-MCNC: 9.4 MG/DL — SIGNIFICANT CHANGE UP (ref 8.5–10.1)
CHLORIDE SERPL-SCNC: 100 MMOL/L — SIGNIFICANT CHANGE UP (ref 98–110)
CO2 SERPL-SCNC: 27 MMOL/L — SIGNIFICANT CHANGE UP (ref 17–32)
CREAT SERPL-MCNC: 1.4 MG/DL — SIGNIFICANT CHANGE UP (ref 0.7–1.5)
EOSINOPHIL # BLD AUTO: 0.15 K/UL — SIGNIFICANT CHANGE UP (ref 0–0.7)
EOSINOPHIL NFR BLD AUTO: 1.3 % — SIGNIFICANT CHANGE UP (ref 0–8)
GAS PNL BLDV: 142 MMOL/L — SIGNIFICANT CHANGE UP (ref 136–145)
GAS PNL BLDV: SIGNIFICANT CHANGE UP
GLUCOSE BLDC GLUCOMTR-MCNC: 61 MG/DL — LOW (ref 70–99)
GLUCOSE SERPL-MCNC: 98 MG/DL — SIGNIFICANT CHANGE UP (ref 70–99)
GRAM STN FLD: SIGNIFICANT CHANGE UP
HCO3 BLDV-SCNC: 32 MMOL/L — HIGH (ref 22–29)
HCT VFR BLD CALC: 47.4 % — HIGH (ref 37–47)
HCT VFR BLDA CALC: 59.8 % — HIGH (ref 34–44)
HGB BLD CALC-MCNC: 19.5 G/DL — HIGH (ref 14–18)
HGB BLD-MCNC: 15.2 G/DL — SIGNIFICANT CHANGE UP (ref 12–16)
IMM GRANULOCYTES NFR BLD AUTO: 1.6 % — HIGH (ref 0.1–0.3)
LACTATE BLDV-MCNC: 3.2 MMOL/L — HIGH (ref 0.5–1.6)
LYMPHOCYTES # BLD AUTO: 1.73 K/UL — SIGNIFICANT CHANGE UP (ref 1.2–3.4)
LYMPHOCYTES # BLD AUTO: 15 % — LOW (ref 20.5–51.1)
MCHC RBC-ENTMCNC: 29.4 PG — SIGNIFICANT CHANGE UP (ref 27–31)
MCHC RBC-ENTMCNC: 32.1 G/DL — SIGNIFICANT CHANGE UP (ref 32–37)
MCV RBC AUTO: 91.7 FL — SIGNIFICANT CHANGE UP (ref 81–99)
METHOD TYPE: SIGNIFICANT CHANGE UP
MONOCYTES # BLD AUTO: 0.84 K/UL — HIGH (ref 0.1–0.6)
MONOCYTES NFR BLD AUTO: 7.3 % — SIGNIFICANT CHANGE UP (ref 1.7–9.3)
NEUTROPHILS # BLD AUTO: 8.58 K/UL — HIGH (ref 1.4–6.5)
NEUTROPHILS NFR BLD AUTO: 74.4 % — SIGNIFICANT CHANGE UP (ref 42.2–75.2)
NRBC # BLD: 0 /100 WBCS — SIGNIFICANT CHANGE UP (ref 0–0)
PCO2 BLDV: 54 MMHG — HIGH (ref 41–51)
PH BLDV: 7.38 — SIGNIFICANT CHANGE UP (ref 7.26–7.43)
PLATELET # BLD AUTO: 231 K/UL — SIGNIFICANT CHANGE UP (ref 130–400)
PO2 BLDV: 21 MMHG — SIGNIFICANT CHANGE UP (ref 20–40)
POTASSIUM BLDV-SCNC: 3.3 MMOL/L — SIGNIFICANT CHANGE UP (ref 3.3–5.6)
POTASSIUM SERPL-MCNC: 3.7 MMOL/L — SIGNIFICANT CHANGE UP (ref 3.5–5)
POTASSIUM SERPL-SCNC: 3.7 MMOL/L — SIGNIFICANT CHANGE UP (ref 3.5–5)
PROT SERPL-MCNC: 6.5 G/DL — SIGNIFICANT CHANGE UP (ref 6–8)
RBC # BLD: 5.17 M/UL — SIGNIFICANT CHANGE UP (ref 4.2–5.4)
RBC # FLD: 16.5 % — HIGH (ref 11.5–14.5)
SAO2 % BLDV: 29 % — SIGNIFICANT CHANGE UP
SODIUM SERPL-SCNC: 141 MMOL/L — SIGNIFICANT CHANGE UP (ref 135–146)
SPECIMEN SOURCE: SIGNIFICANT CHANGE UP
SPECIMEN SOURCE: SIGNIFICANT CHANGE UP
TROPONIN T SERPL-MCNC: 0.04 NG/ML — CRITICAL HIGH
WBC # BLD: 11.53 K/UL — HIGH (ref 4.8–10.8)
WBC # FLD AUTO: 11.53 K/UL — HIGH (ref 4.8–10.8)

## 2020-11-28 PROCEDURE — 71275 CT ANGIOGRAPHY CHEST: CPT | Mod: 26

## 2020-11-28 PROCEDURE — 74174 CTA ABD&PLVS W/CONTRAST: CPT | Mod: 26

## 2020-11-28 PROCEDURE — 93010 ELECTROCARDIOGRAM REPORT: CPT | Mod: 76

## 2020-11-28 PROCEDURE — 99222 1ST HOSP IP/OBS MODERATE 55: CPT

## 2020-11-28 RX ORDER — LABETALOL HCL 100 MG
10 TABLET ORAL ONCE
Refills: 0 | Status: COMPLETED | OUTPATIENT
Start: 2020-11-28 | End: 2020-11-28

## 2020-11-28 RX ORDER — CHLORHEXIDINE GLUCONATE 213 G/1000ML
1 SOLUTION TOPICAL
Refills: 0 | Status: DISCONTINUED | OUTPATIENT
Start: 2020-11-28 | End: 2020-12-03

## 2020-11-28 RX ORDER — MIRTAZAPINE 45 MG/1
1 TABLET, ORALLY DISINTEGRATING ORAL
Qty: 0 | Refills: 0 | DISCHARGE

## 2020-11-28 RX ORDER — MIRTAZAPINE 45 MG/1
30 TABLET, ORALLY DISINTEGRATING ORAL AT BEDTIME
Refills: 0 | Status: DISCONTINUED | OUTPATIENT
Start: 2020-11-28 | End: 2020-12-03

## 2020-11-28 RX ORDER — ASPIRIN/CALCIUM CARB/MAGNESIUM 324 MG
81 TABLET ORAL DAILY
Refills: 0 | Status: DISCONTINUED | OUTPATIENT
Start: 2020-11-28 | End: 2020-12-03

## 2020-11-28 RX ORDER — VANCOMYCIN HCL 1 G
1000 VIAL (EA) INTRAVENOUS ONCE
Refills: 0 | Status: COMPLETED | OUTPATIENT
Start: 2020-11-28 | End: 2020-11-28

## 2020-11-28 RX ORDER — LEVOTHYROXINE SODIUM 125 MCG
1 TABLET ORAL
Qty: 0 | Refills: 0 | DISCHARGE

## 2020-11-28 RX ORDER — METOPROLOL TARTRATE 50 MG
1 TABLET ORAL
Qty: 0 | Refills: 0 | DISCHARGE

## 2020-11-28 RX ORDER — ASPIRIN/CALCIUM CARB/MAGNESIUM 324 MG
1 TABLET ORAL
Qty: 0 | Refills: 0 | DISCHARGE

## 2020-11-28 RX ORDER — MEGESTROL ACETATE 40 MG/ML
40 SUSPENSION ORAL DAILY
Refills: 0 | Status: DISCONTINUED | OUTPATIENT
Start: 2020-11-28 | End: 2020-12-03

## 2020-11-28 RX ORDER — VANCOMYCIN HCL 1 G
1000 VIAL (EA) INTRAVENOUS EVERY 12 HOURS
Refills: 0 | Status: DISCONTINUED | OUTPATIENT
Start: 2020-11-28 | End: 2020-11-28

## 2020-11-28 RX ORDER — PANTOPRAZOLE SODIUM 20 MG/1
1 TABLET, DELAYED RELEASE ORAL
Qty: 0 | Refills: 0 | DISCHARGE

## 2020-11-28 RX ORDER — VANCOMYCIN HCL 1 G
1000 VIAL (EA) INTRAVENOUS EVERY 24 HOURS
Refills: 0 | Status: DISCONTINUED | OUTPATIENT
Start: 2020-11-29 | End: 2020-11-30

## 2020-11-28 RX ORDER — FUROSEMIDE 40 MG
1 TABLET ORAL
Qty: 0 | Refills: 0 | DISCHARGE

## 2020-11-28 RX ORDER — MEGESTROL ACETATE 40 MG/ML
1 SUSPENSION ORAL
Qty: 0 | Refills: 0 | DISCHARGE

## 2020-11-28 RX ORDER — FUROSEMIDE 40 MG
40 TABLET ORAL DAILY
Refills: 0 | Status: DISCONTINUED | OUTPATIENT
Start: 2020-11-28 | End: 2020-11-28

## 2020-11-28 RX ORDER — RIVAROXABAN 15 MG-20MG
20 KIT ORAL DAILY
Refills: 0 | Status: DISCONTINUED | OUTPATIENT
Start: 2020-11-28 | End: 2020-12-03

## 2020-11-28 RX ORDER — LEVOTHYROXINE SODIUM 125 MCG
25 TABLET ORAL DAILY
Refills: 0 | Status: DISCONTINUED | OUTPATIENT
Start: 2020-11-28 | End: 2020-12-03

## 2020-11-28 RX ORDER — PANTOPRAZOLE SODIUM 20 MG/1
40 TABLET, DELAYED RELEASE ORAL
Refills: 0 | Status: DISCONTINUED | OUTPATIENT
Start: 2020-11-28 | End: 2020-12-03

## 2020-11-28 RX ORDER — SODIUM CHLORIDE 9 MG/ML
1000 INJECTION, SOLUTION INTRAVENOUS
Refills: 0 | Status: DISCONTINUED | OUTPATIENT
Start: 2020-11-28 | End: 2020-11-28

## 2020-11-28 RX ORDER — METOPROLOL TARTRATE 50 MG
100 TABLET ORAL DAILY
Refills: 0 | Status: DISCONTINUED | OUTPATIENT
Start: 2020-11-28 | End: 2020-12-03

## 2020-11-28 RX ORDER — VANCOMYCIN HCL 1 G
VIAL (EA) INTRAVENOUS
Refills: 0 | Status: DISCONTINUED | OUTPATIENT
Start: 2020-11-28 | End: 2020-11-30

## 2020-11-28 RX ADMIN — RIVAROXABAN 20 MILLIGRAM(S): KIT at 13:28

## 2020-11-28 RX ADMIN — Medication 10 MILLIGRAM(S): at 18:45

## 2020-11-28 RX ADMIN — MEGESTROL ACETATE 40 MILLIGRAM(S): 40 SUSPENSION ORAL at 13:28

## 2020-11-28 RX ADMIN — Medication 100 MILLIGRAM(S): at 13:28

## 2020-11-28 RX ADMIN — Medication 81 MILLIGRAM(S): at 14:09

## 2020-11-28 RX ADMIN — Medication 250 MILLIGRAM(S): at 22:11

## 2020-11-28 RX ADMIN — Medication 1 TABLET(S): at 13:28

## 2020-11-28 RX ADMIN — SODIUM CHLORIDE 50 MILLILITER(S): 9 INJECTION, SOLUTION INTRAVENOUS at 12:49

## 2020-11-28 RX ADMIN — MIRTAZAPINE 30 MILLIGRAM(S): 45 TABLET, ORALLY DISINTEGRATING ORAL at 21:51

## 2020-11-28 NOTE — CHART NOTE - NSCHARTNOTEFT_GEN_A_CORE
I received the call from lab that blood culture is positive for coagulase negative Staphylococci.  This might be contaminant vs bacteremia. I started vancomycin, will repeat blood culture. If repeat  blood culture comes back negative, consider discontinuing antibiotics.

## 2020-11-28 NOTE — H&P ADULT - ASSESSMENT
Ms. Lira is an 80F with a PMH of HF with intermediate EF, CAD s/p stents, pHTN, HTN, MALToma, afib, and Type 2 DM presented to the ED for evaluation of shortness of breath found to be in rapid afib.     # Acute on Chronic RHF secondary to Afib RVR  - EF in 4/2020 showed EF = 45-50% with mod pHTN, follow-up repeat echo  - Continue Xarelto 20mg PO daily for anticoagulation  - HR better controlled now, resume Lopressor 100mg PO twice daily for rate control  - Daily EKG  - Cardiology consult, saw Dr. LISBET Braxton last admission  - Consider HF consult, BNP >21,000  - Lasix 20mg PO daily at home, increase to Lasix 40mg IV once daily for now. Monitor renal function.    # CAD s/p stents   #HTN  - Continue ASA 81mg daily  - Continue Lopressor 100mg PO twice daily   - Follow-up repeat trop     Type 2 DM  - FS qAC and qHS, insulin protocol if needed  - Follow-up A1c  - CC diet     # MALToma s/p chemoradiation  # PUD  - Continue Protonix 40mg PO twice daily   - Outpatient follow-up    DVT ppx: Xarelto 20mg PO daily    GI ppx: Protonix 40mg twice daily   Diet: Consistent Carbs  Activity: IAT   Dispo: From home   Code: FULL         Ms. Lira is an 80F with a PMH of HF with intermediate EF, CAD s/p stents, pHTN, HTN, MALToma, afib, and Type 2 DM presented to the ED for evaluation of shortness of breath found to be in rapid afib.     # Acute on Chronic RHF secondary to Afib RVR  - EF in 4/2020 showed EF = 45-50% with mod pHTN, follow-up repeat echo  - Continue Xarelto 20mg PO daily for anticoagulation  - HR better controlled now, resume Lopressor 100mg PO twice daily for rate control  - Daily EKG  - Cardiology consult, saw Dr. LISBET Braxton last admission  - Consider HF consult, BNP >21,000  - Lasix 20mg PO daily at home, increase to Lasix 40mg IV once daily for now. Monitor renal function.    # HAGMA 2/2 Lactic Acidosis   - LA 6.0 on admission, given fluids and repeat was 3.2 Follow-up at 11am.     # CAD s/p stents   #HTN  - Continue ASA 81mg daily  - Continue Lopressor 100mg PO twice daily   - Follow-up repeat trop     Type 2 DM  - FS qAC and qHS, insulin protocol if needed  - Follow-up A1c  - CC diet     # CKD  - Cr bumped up from baseline (1.4 -> 1.8) Continue to monitor. Avoid nephrotoxins. Do not overdiurese.     # MALToma s/p chemoradiation  # PUD  - Continue Protonix 40mg PO twice daily   - Outpatient follow-up    DVT ppx: Xarelto 20mg PO daily    GI ppx: Protonix 40mg twice daily   Diet: Consistent Carbs  Activity: IAT   Dispo: From home   Code: FULL         Ms. Lira is an 80F with a PMH of HF with intermediate EF, CAD s/p stents, pHTN, HTN, MALToma, afib, and Type 2 DM presented to the ED for evaluation of shortness of breath found to be in rapid afib.     # Acute on Chronic RHF secondary to Afib RVR  - EF in 4/2020 showed EF = 45-50% with mod pHTN, follow-up repeat echo  - Continue Xarelto 20mg PO daily for anticoagulation  - HR better controlled now, continue Toprol 100mg daily for rate control  - Daily EKG  - Cardiology consult, saw Dr. LISBET Braxton last admission  - Consider HF consult, BNP >12,000  - Lasix 40mg PO daily at home, continue Hold Lasix for now. Monitor renal function. Given Hemoconcentration and Na of 149 with hisotory of decreased PO intake, pt is volume depleted intravascularly, follow-up repeat lytes and give gentle hydration LR at 50mL/hr     # HAGMA 2/2 Lactic Acidosis   - LA 6.0 on admission, given fluids and repeat was 3.2 Follow-up at 11am.     # CAD s/p stents   #HTN  - Continue ASA 81mg daily  - Continue Toprol 100mg PO daily   - Follow-up repeat trop     # CKD  - Cr bumped up from baseline (1.4 -> 1.8) Continue to monitor. Avoid nephrotoxins. Hold lasix for now and gently hydrate.      # MALToma s/p chemoradiation in remission  # PUD  - Continue Protonix 40mg PO daily   - Outpatient follow-up    DVT ppx: Xarelto 20mg PO daily    GI ppx: Protonix 40mg daily   Diet: DASH  Activity: IAT   Dispo: From home   Code: FULL         Ms. Lira is an 80F with a PMH of HF with intermediate EF, CAD s/p stents, pHTN, HTN, MALToma, afib, and Type 2 DM presented to the ED for evaluation of shortness of breath found to be in rapid afib.     # Acute on Chronic RHF secondary to Afib RVR  - EF in 4/2020 showed EF = 45-50% with mod pHTN, follow-up repeat echo  - Continue Xarelto 20mg PO daily for anticoagulation  - HR better controlled now, continue Toprol 100mg daily for rate control  - Daily EKG  - Cardiology consult, saw Dr. LISBET Braxton last admission  - Consider HF consult, BNP >12,000  - Lasix 40mg PO daily at home, continue Hold Lasix for now. Monitor renal function. Given Hemoconcentration and Na of 149 with hisotory of decreased PO intake, pt is volume depleted intravascularly, follow-up repeat lytes and give gentle hydration LR at 50mL/hr     # HAGMA 2/2 Lactic Acidosis   - LA 6.0 on admission, given fluids and repeat was 3.2 Follow-up at 11am.     # CAD s/p stents   #HTN  - Continue ASA 81mg daily  - Continue Toprol 100mg PO daily   - Follow-up repeat trop     # Hypothyroidism   - Synthroid 25 mcg daily   - Follow-up TSH    # CKD  - Cr bumped up from baseline (1.4 -> 1.8) Continue to monitor. Avoid nephrotoxins. Hold lasix for now and gently hydrate.      # MALToma s/p chemoradiation in remission  # PUD  - Continue Protonix 40mg PO daily   - Outpatient follow-up    DVT ppx: Xarelto 20mg PO daily    GI ppx: Protonix 40mg daily   Diet: DASH  Activity: IAT   Dispo: From home   Code: FULL         Ms. Lira is an 80F with a PMH of HF with reduced EF, CAD s/p stents, pHTN, HTN, MALToma, afib, and Type 2 DM presented to the ED for evaluation of shortness of breath found to be in rapid afib.     # Atrial fibrillation with RVR 2/2 hypoperfusion in the setting of overdiuresis   - I believe this occurred given the patients recent decreased PO intake and concomitant use of Lasix -> overdiuresis and volume depletion -> hypoperfusion/shock-like state -> adrenergic activation and HAGMA from lactic acidosis -> atrial fib with RVR -> further hypoperfusion -> symptoms of AMS and SOB that lead to presentation. Patient improved after fluid resuscitation with no use of medication.   - EF in 4/2020 showed EF = 45-50% with mod pHTN, follow-up repeat echo  - Continue Xarelto 20mg PO daily for anticoagulation  - HR better controlled now, continue Toprol 100mg daily for rate control  - Daily EKG  - Cardiology consult, saw Dr. LISBET Braxton last admission  - Consider HF consult, BNP >12,000  - Lasix 40mg PO daily at home, continue Hold Lasix for now. Monitor renal function. Given Hemoconcentration and Na of 149 with hisotory of decreased PO intake, pt is volume depleted intravascularly, follow-up repeat lytes and give gentle hydration LR at 50mL/hr     # HAGMA 2/2 Lactic Acidosis   - LA 6.0 on admission, given fluids and repeat was 3.2 Follow-up at 11am.     # CAD s/p stents   #HTN  - Continue ASA 81mg daily  - Continue Toprol 100mg PO daily   - Follow-up repeat trop     # Hypothyroidism   - Synthroid 25 mcg daily   - Follow-up TSH    # CKD  - Cr bumped up from baseline (1.4 -> 1.8) Continue to monitor. Avoid nephrotoxins. Hold lasix for now and gently hydrate.      # MALToma s/p chemoradiation in remission  # PUD  - Continue Protonix 40mg PO daily   - Outpatient follow-up    DVT ppx: Xarelto 20mg PO daily    GI ppx: Protonix 40mg daily   Diet: DASH  Activity: IAT   Dispo: From home   Code: FULL

## 2020-11-28 NOTE — PHYSICAL THERAPY INITIAL EVALUATION ADULT - GENERAL OBSERVATIONS, REHAB EVAL
PT IE attempt 1:30pm. As per RN Vera, patient has low SPO2 and is not appropriate for PT. Will follow up at another time.

## 2020-11-28 NOTE — H&P ADULT - ATTENDING COMMENTS
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 /100. EKG revealed Afib with RVR. CT Angio was negative for PE, COVID negative. CXR with R effusion. Labs Na 149, BNP >12K, Lactate 6, and Cr 1.8    PHYSICAL EXAM:  GENERAL: sleepy, no distress.   HEAD:  Atraumatic, Normocephalic.  EYES: EOMI, PERRLA, conjunctiva and sclera clear.  NECK: Supple, No JVD.  CHEST/LUNG: bibasilar rales.   HEART: Irregular rate and rhythm; S1 S2.   ABDOMEN: Soft, Nontender, Nondistended; Bowel sounds present.  EXTREMITIES:  2+ Peripheral Pulses, No clubbing, cyanosis, or edema.  PSYCH: confused, not oriented to place and or date.   NEUROLOGY: non-focal.  SKIN: No rashes or lesions.    A/P:   Atrial Fibrillation with RVR:   Improved.   continue Metoprolol and Xarelto.     VANI on KD stage 3:   Cr 1.4 from 1.8, s/p IV fluid in the ED.   Encourage oral hydration   Send UA.     Chronic HFmrEF:   stable. ProBNP 12K (was 17K in April 2020).  Lasix on hold, will resume tomorrow.    HAGMA 2/2 Lactic Acidosis   LA 6.0 on admission, given fluids and repeat was 3.2 Follow-up at 11am.     CAD s/p stents   Continue ASA 81mg daily Toprol 100mg PO daily.  Troponin mildly elevated likely from tachycardia.

## 2020-11-28 NOTE — H&P ADULT - HISTORY OF PRESENT ILLNESS
Chief Complaint: Shortness of breath       Past Medical History: HFiEF (45-50% 4/2020), Right Heart Failure, CAD s/p remote PCI with stents, HTN, pHTN, MALToma of stomach s/p chemoradiation, atrial fibrillation on Xarelto, PUD, Type 2 DM      Past Surgical History: None Relevant       History of present illness goes back to the day of presentation when the patient endorsed shortness of breath      In the ED, she was hypoxic to the 80's on room air and placed on 4LNC which improved her SpO2 to 100%, she was also hypertensive to 175/100 with a HR of 150. EKG revealed Afib with RVR. CT Angio was negative for PE, COVID negative. CXR with r effusion. Admitted to telemetry to management of dyspnea secondary to right heart failure secondary to rapid afib and volume overload.       ROS: Denies headache, changes in vision, chest pain, palpitations, shortness of breath, cough, fever, chills, nausea, vomiting, diarrhea, and constipation. Chief Complaint: Shortness of breath       Past Medical History: HFiEF (45-50% 4/2020), Right Heart Failure, CAD s/p remote PCI with stents, HTN, pHTN, MALToma of stomach s/p chemoradiation, atrial fibrillation on Xarelto, PUD, Type 2 DM, CKD 3-4      Past Surgical History: None Relevant       History of present illness goes back to the day of presentation when the patient endorsed shortness of breath      In the ED, she was hypoxic to the 80's on room air and placed on 4LNC which improved her SpO2 to 100%, she was also hypertensive to 175/100 with a HR of 150. EKG revealed Afib with RVR. CT Angio was negative for PE, COVID negative. CXR with r effusion. Admitted to telemetry to management of dyspnea secondary to right heart failure secondary to rapid afib and volume overload.       ROS: Denies headache, changes in vision, chest pain, palpitations, shortness of breath, cough, fever, chills, nausea, vomiting, diarrhea, and constipation. Chief Complaint: Weakness       Past Medical History: HFiEF (45-50% 4/2020), Right Heart Failure, CAD s/p remote PCI with stents, HTN, pHTN, 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 Her daughter noticed and asked if her mother (the patient) was okay. Her mother said she was okay, but did not want to get off the toilet. She sat there for awhile until her daughter insisted she get up from the toilet. She was too weak to get up after trying multiple times, and her lips began to turn blue per the daughter and "wasn't herself". She called EMS and was hypoxic to the 80's and tachy to 150. She brought her to the ED for hypoxia and AMS. Of note, per the daughter, Lexii, she was not eating and drinking well the last few days and was more lethargic than usual.       In the ED, she was hypoxic to the 80's on room air and placed on 4LNC which improved her SpO2 to 100% she was on BiPAP for a short time then placed back on NC, she was also hypertensive to 175/100 with a HR of 150. EKG revealed Afib with RVR. CT Angio was negative for PE, COVID negative. CXR with R effusion. Labs significant for sodium 149, BNP >12K, Lactate 6, and Cr 1.8. Admitted to telemetry to management of dyspnea secondary to right heart failure secondary to rapid afib and volume overload.       ROS: Denies headache, changes in vision, chest pain, palpitations, shortness of breath, cough, fever, chills, nausea, vomiting, diarrhea, and constipation. Chief Complaint: Shortness of breath and AMS      Past Medical History: HFrEF (45-50% 4/2020), Right Heart Failure, CAD s/p remote PCI with stents, HTN, pHTN, 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 Her daughter noticed and asked if her mother (the patient) was okay. Her mother said she was okay, but did not want to get off the toilet. She sat there for awhile until her daughter insisted she get up from the toilet. She was too weak to get up after trying multiple times, and her lips began to turn blue per the daughter and "wasn't herself". She called EMS and was hypoxic to the 80's and tachy to 150. She brought her to the ED for hypoxia and AMS. Of note, per the daughter, Lexii, she was not eating and drinking well the last few days and was more lethargic than usual.       In the ED, she was hypoxic to the 80's on room air and placed on 4LNC which improved her SpO2 to 100% she was on BiPAP for a short time then placed back on NC, she was also hypertensive to 175/100 with a HR of 150. EKG revealed Afib with RVR. CT Angio was negative for PE, COVID negative. CXR with R effusion. Labs significant for sodium 149, BNP >12K, Lactate 6, and Cr 1.8. Admitted to telemetry to management of dyspnea secondary to right heart failure secondary to rapid afib and volume overload.       ROS: Denies headache, changes in vision, chest pain, palpitations, shortness of breath, cough, fever, chills, nausea, vomiting, diarrhea, and constipation.

## 2020-11-28 NOTE — ED ADULT NURSE REASSESSMENT NOTE - NS ED NURSE REASSESS COMMENT FT1
Patient received from RNapril does not want to talk to anyone at this time nad wants to sleep. VSS, IV intact, waiting for CT scan. Will continue to monitor.

## 2020-11-28 NOTE — H&P ADULT - NSHPLABSRESULTS_GEN_ALL_CORE
(11-27 @ 20:53)                      17.3  14.28 )-----------( 303                 55.0    Neutrophils = 11.52 (80.7%)  Lymphocytes = 2.13 (14.9%)  Eosinophils = 0.00 (0.0%)  Basophils = 0.00 (0.0%)  Monocytes = 0.13 (0.9%)  Bands = --%    11-27    149<H>  |  99  |  59<H>  ----------------------------<  122<H>  3.7   |  26  |  1.8<H>    Ca    10.5<H>      27 Nov 2020 20:53    TPro  8.2<H>  /  Alb  4.3  /  TBili  0.3  /  DBili  x   /  AST  45<H>  /  ALT  35  /  AlkPhos  121<H>  11-27    ( 27 Nov 2020 20:53 )   PT: 17.30 sec;   INR: 1.50 ratio;       PTT:27.8 sec  CARDIAC MARKERS ( 27 Nov 2020 20:53 )  Trop 0.07 ng/mL<HH> / CK x     / CKMB x           RVP:(11-27 @ 20:53)  [facility]      Venous Blood Gas:  11-28 @ 00:51  7.38/54/21/32/29  VBG Lactate: 3.2  Venous Blood Gas:  11-27 @ 21:10  7.30/66/29/32/40  VBG Lactate: 6.0        Tox:

## 2020-11-28 NOTE — CONSULT NOTE ADULT - SUBJECTIVE AND OBJECTIVE BOX
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 Her daughter noticed and asked if her mother (the patient) was okay. Her mother said she was okay, but did not want to get off the toilet. She sat there for awhile until her daughter insisted she get up from the toilet. She was too weak to get up after trying multiple times, and her lips began to turn blue per the daughter and "wasn't herself". She called EMS and was hypoxic to the 80's and tachy to 150. She brought her to the ED for hypoxia and AMS. Of note, per the daughter, Lexii, she was not eating and drinking well the last few days and was more lethargic than usual.       In the ED, she was hypoxic to the 80's on room air and placed on 4LNC which improved her SpO2 to 100% she was on BiPAP for a short time then placed back on NC, she was also hypertensive to 175/100 with a HR of 150. EKG revealed Afib with RVR. CT Angio was negative for PE, COVID negative. CXR with R effusion. Labs significant for sodium 149, BNP >12K, Lactate 6, and Cr 1.8. Admitted to telemetry to management of dyspnea secondary to right heart failure secondary to rapid afib and volume overload.       ROS: Denies headache, changes in vision, chest pain, palpitations, shortness of breath, cough, fever, chills, nausea, vomiting, diarrhea, and constipation.  (28 Nov 2020 08:34)      PAST MEDICAL & SURGICAL HISTORY  History of stomach cancer  25 years ago-- had stomach surgery    Hyperlipidemia    CAD S/P percutaneous coronary angioplasty    Atrial fibrillation    HTN (hypertension)    Diverticulitis    S/P small bowel resection    H/O abdominal hysterectomy        FAMILY HISTORY:  FAMILY HISTORY:  No pertinent family history in first degree relatives        SOCIAL HISTORY:  []smoker  []Alcohol  []Drug    ALLERGIES:  penicillin (Anaphylaxis)  penicillin (Unknown)      MEDICATIONS:  MEDICATIONS  (STANDING):  aspirin  chewable 81 milliGRAM(s) Oral daily  chlorhexidine 4% Liquid 1 Application(s) Topical <User Schedule>  lactated ringers. 1000 milliLiter(s) (50 mL/Hr) IV Continuous <Continuous>  levothyroxine 25 MICROGram(s) Oral daily  megestrol 40 milliGRAM(s) Oral daily  metoprolol succinate  milliGRAM(s) Oral daily  mirtazapine 30 milliGRAM(s) Oral at bedtime  multivitamin 1 Tablet(s) Oral daily  pantoprazole    Tablet 40 milliGRAM(s) Oral before breakfast  rivaroxaban 20 milliGRAM(s) Oral daily    MEDICATIONS  (PRN):      HOME MEDICATIONS:  Home Medications:  aspirin 81 mg oral tablet, chewable: 1 tab(s) orally once a day (28 Nov 2020 10:03)  furosemide 40 mg oral tablet: 1 tab(s) orally once a day (28 Nov 2020 10:03)  levothyroxine 25 mcg (0.025 mg) oral tablet: 1 tab(s) orally once a day (28 Nov 2020 10:03)  megestrol 40 mg oral tablet: 1 tab(s) orally once a day (28 Nov 2020 10:03)  Metoprolol Succinate  mg oral tablet, extended release: 1 tab(s) orally once a day (28 Nov 2020 10:03)  mirtazapine 30 mg oral tablet: 1 tab(s) orally once a day (at bedtime) (28 Nov 2020 10:03)  Multiple Vitamins oral tablet: 1 tab(s) orally once a day (28 Nov 2020 10:03)  Protonix 40 mg oral delayed release tablet: 1 tab(s) orally once a day (28 Nov 2020 10:03)  Xarelto: 20 milligram(s) orally once a day (28 Nov 2020 10:03)      VITALS:   T(F): 97.8 (11-27 @ 20:20), Max: 97.8 (11-27 @ 20:20)  HR: 99 (11-28 @ 07:36) (93 - 150)  BP: 168/79 (11-28 @ 06:47) (140/95 - 199/83)  BP(mean): --  RR: 18 (11-28 @ 07:36) (18 - 24)  SpO2: 100% (11-28 @ 07:36) (80% - 100%)    I&O's Summary      REVIEW OF SYSTEMS:  CONSTITUTIONAL: No weakness, fevers or chills  EYES: No visual changes  ENT: No vertigo or throat pain   NECK: No pain or stiffness  RESPIRATORY: No cough, wheezing, hemoptysis; No shortness of breath  CARDIOVASCULAR: No chest pain or palpitations  GASTROINTESTINAL: No abdominal or epigastric pain. No nausea, vomiting, or hematemesis; No diarrhea or constipation. No melena or hematochezia.  GENITOURINARY: No dysuria, frequency or hematuria  NEUROLOGICAL: No numbness or weakness  SKIN: No itching, no rashes  MSK: No pain    PHYSICAL EXAM:  NEURO: patient is awake , alert and oriented  GEN: Not in acute distress  NECK: no thyroid enlargement, no JVD  LUNGS: Clear to auscultation bilaterally   CARDIOVASCULAR: S1/S2 present, RRR , no murmurs or rubs, no carotid bruits,  + PP bilaterally  ABD: Soft, non-tender, non-distended, +BS  EXT: No ELVIS  SKIN: Intact    LABS:                        17.3   14.28 )-----------( 303      ( 27 Nov 2020 20:53 )             55.0     11-27    149<H>  |  99  |  59<H>  ----------------------------<  122<H>  3.7   |  26  |  1.8<H>    Ca    10.5<H>      27 Nov 2020 20:53    TPro  8.2<H>  /  Alb  4.3  /  TBili  0.3  /  DBili  x   /  AST  45<H>  /  ALT  35  /  AlkPhos  121<H>  11-27    PT/INR - ( 27 Nov 2020 20:53 )   PT: 17.30 sec;   INR: 1.50 ratio         PTT - ( 27 Nov 2020 20:53 )  PTT:27.8 sec  Troponin T, Serum: 0.04 ng/mL <HH> (11-28-20 @ 11:42)  Troponin T, Serum: 0.07 ng/mL <HH> (11-27-20 @ 20:53)    CARDIAC MARKERS ( 28 Nov 2020 11:42 )  x     / 0.04 ng/mL / x     / x     / x      CARDIAC MARKERS ( 27 Nov 2020 20:53 )  x     / 0.07 ng/mL / x     / x     / x            Troponin trend:    Serum Pro-Brain Natriuretic Peptide: 47648 pg/mL (11-27-20 @ 20:53)          RADIOLOGY:  -CXR:  -TTE:  -CCTA:  -STRESS TEST:  -CATHETERIZATION:    ECG:    TELEMETRY EVENTS:   HPI:  Chief Complaint: Weakness       Past Medical History: HFrEF (45-50% 4/2020), Right Heart Failure, CAD s/p remote PCI with stents, HTN, pHTN, 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 Her daughter noticed and asked if her mother (the patient) was okay. Her mother said she was okay, but did not want to get off the toilet. She sat there for awhile until her daughter insisted she get up from the toilet. She was too weak to get up after trying multiple times, and her lips began to turn blue per the daughter and "wasn't herself". She called EMS and was hypoxic to the 80's and tachy to 150. She brought her to the ED for hypoxia and AMS. Of note, per the daughter, Lexii, she was not eating and drinking well the last few days and was more lethargic than usual.       In the ED, she was hypoxic to the 80's on room air and placed on 4LNC which improved her SpO2 to 100% she was on BiPAP for a short time then placed back on NC, she was also hypertensive to 175/100 with a HR of 150. EKG revealed Afib with RVR. CT Angio was negative for PE, COVID negative. CXR with R effusion. Labs significant for sodium 149, BNP >12K, Lactate 6, and Cr 1.8. Admitted to telemetry to management of dyspnea secondary to right heart failure secondary to rapid afib and volume overload.       ROS: Denies headache, changes in vision, chest pain, palpitations, shortness of breath, cough, fever, chills, nausea, vomiting, diarrhea, and constipation.  (28 Nov 2020 08:34)      PAST MEDICAL & SURGICAL HISTORY  History of stomach cancer  25 years ago-- had stomach surgery    Hyperlipidemia    CAD S/P percutaneous coronary angioplasty    Atrial fibrillation    HTN (hypertension)    Diverticulitis    S/P small bowel resection    H/O abdominal hysterectomy        FAMILY HISTORY:  FAMILY HISTORY:  No pertinent family history in first degree relatives        SOCIAL HISTORY:  []smoker  []Alcohol  []Drug    ALLERGIES:  penicillin (Anaphylaxis)  penicillin (Unknown)      MEDICATIONS:  MEDICATIONS  (STANDING):  aspirin  chewable 81 milliGRAM(s) Oral daily  chlorhexidine 4% Liquid 1 Application(s) Topical <User Schedule>  lactated ringers. 1000 milliLiter(s) (50 mL/Hr) IV Continuous <Continuous>  levothyroxine 25 MICROGram(s) Oral daily  megestrol 40 milliGRAM(s) Oral daily  metoprolol succinate  milliGRAM(s) Oral daily  mirtazapine 30 milliGRAM(s) Oral at bedtime  multivitamin 1 Tablet(s) Oral daily  pantoprazole    Tablet 40 milliGRAM(s) Oral before breakfast  rivaroxaban 20 milliGRAM(s) Oral daily    MEDICATIONS  (PRN):      HOME MEDICATIONS:  Home Medications:  aspirin 81 mg oral tablet, chewable: 1 tab(s) orally once a day (28 Nov 2020 10:03)  furosemide 40 mg oral tablet: 1 tab(s) orally once a day (28 Nov 2020 10:03)  levothyroxine 25 mcg (0.025 mg) oral tablet: 1 tab(s) orally once a day (28 Nov 2020 10:03)  megestrol 40 mg oral tablet: 1 tab(s) orally once a day (28 Nov 2020 10:03)  Metoprolol Succinate  mg oral tablet, extended release: 1 tab(s) orally once a day (28 Nov 2020 10:03)  mirtazapine 30 mg oral tablet: 1 tab(s) orally once a day (at bedtime) (28 Nov 2020 10:03)  Multiple Vitamins oral tablet: 1 tab(s) orally once a day (28 Nov 2020 10:03)  Protonix 40 mg oral delayed release tablet: 1 tab(s) orally once a day (28 Nov 2020 10:03)  Xarelto: 20 milligram(s) orally once a day (28 Nov 2020 10:03)      VITALS:   T(F): 97.8 (11-27 @ 20:20), Max: 97.8 (11-27 @ 20:20)  HR: 99 (11-28 @ 07:36) (93 - 150)  BP: 168/79 (11-28 @ 06:47) (140/95 - 199/83)  BP(mean): --  RR: 18 (11-28 @ 07:36) (18 - 24)  SpO2: 100% (11-28 @ 07:36) (80% - 100%)    I&O's Summary      REVIEW OF SYSTEMS:  Patient is a poor historian; she denies any symptoms at the time of my encounter with the patient  CONSTITUTIONAL: No weakness, fevers or chills  EYES: No visual changes  ENT: No vertigo or throat pain   NECK: No pain or stiffness  RESPIRATORY: No cough, wheezing, hemoptysis; No shortness of breath  CARDIOVASCULAR: No chest pain or palpitations  GASTROINTESTINAL: No abdominal or epigastric pain. No nausea, vomiting, or hematemesis; No diarrhea or constipation. No melena or hematochezia.  GENITOURINARY: No dysuria, frequency or hematuria  NEUROLOGICAL: No numbness or weakness  SKIN: No itching, no rashes  MSK: No pain    PHYSICAL EXAM:  NEURO: patient is awake , alert and oriented  GEN: Not in acute distress  NECK: no thyroid enlargement, no JVD  LUNGS: Clear to auscultation bilaterally   CARDIOVASCULAR: S1/S2 present, irregularly irregular  ABD: Soft, non-tender, non-distended  EXT: No ELIVS  SKIN: Intact    LABS:                        17.3   14.28 )-----------( 303      ( 27 Nov 2020 20:53 )             55.0     11-27    149<H>  |  99  |  59<H>  ----------------------------<  122<H>  3.7   |  26  |  1.8<H>    Ca    10.5<H>      27 Nov 2020 20:53    TPro  8.2<H>  /  Alb  4.3  /  TBili  0.3  /  DBili  x   /  AST  45<H>  /  ALT  35  /  AlkPhos  121<H>  11-27    PT/INR - ( 27 Nov 2020 20:53 )   PT: 17.30 sec;   INR: 1.50 ratio         PTT - ( 27 Nov 2020 20:53 )  PTT:27.8 sec  Troponin T, Serum: 0.04 ng/mL <HH> (11-28-20 @ 11:42)  Troponin T, Serum: 0.07 ng/mL <HH> (11-27-20 @ 20:53)    CARDIAC MARKERS ( 28 Nov 2020 11:42 )  x     / 0.04 ng/mL / x     / x     / x      CARDIAC MARKERS ( 27 Nov 2020 20:53 )  x     / 0.07 ng/mL / x     / x     / x            Troponin trend:    Serum Pro-Brain Natriuretic Peptide: 92449 pg/mL (11-27-20 @ 20:53)          RADIOLOGY:  -CXR:    < from: Xray Chest 1 View-PORTABLE IMMEDIATE (Xray Chest 1 View-PORTABLE IMMEDIATE .) (11.27.20 @ 22:00) >  Impression:    Small right pleural effusion.    < end of copied text >    -TTE:    < from: Transthoracic Echocardiogram (04.26.20 @ 07:57) >  Summary:   1. Left ventricular ejection fraction, by visual estimation, is 45 to 50%.   2. Technically suboptimal study.   3. Mildly decreased global left ventricular systolic function.   4. Right ventricular volume and pressure overload.   5. RV is not well visualized, appears markedly dilated.   6. Left atrium is mildly dilated.   7. Moderately enlarged right atrium.   8. Moderate pleural effusion in the right lateral region.   9. Mild thickening of the anterior and posterior mitral valve leaflets.  10. Moderate mitral valve regurgitation.  11. Moderate tricuspid regurgitation.  12. Estimated pulmonary artery systolic pressure is 51.5 mmHg assuming a right atrial pressure of 15 mmHg, which is consistent with moderate pulmonary hypertension.    < end of copied text >    -CCTA:  -STRESS TEST:  -CATHETERIZATION:      < from: CT Angio Chest w/ IV Cont (11.28.20 @ 04:31) >  IMPRESSION:    No CTA evidence of aortic aneurysm, dissection or intramural hematoma.    Circumferential wall thickening of the distal sigmoid and rectum. Differential includes neoplastic process.    Small right pleural effusion.    Additional Findings/Recommendations After Attending Radiologist Review:  Agree there is sigmoid colonic and rectal wall thickening. Differential diagnosis includes colitis, neoplasm unlikely.      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 Her daughter noticed and asked if her mother (the patient) was okay. Her mother said she was okay, but did not want to get off the toilet. She sat there for awhile until her daughter insisted she get up from the toilet. She was too weak to get up after trying multiple times, and her lips began to turn blue per the daughter and "wasn't herself". She called EMS and was hypoxic to the 80's and tachy to 150. She brought her to the ED for hypoxia and AMS. Of note, per the daughter, Lexii, she was not eating and drinking well the last few days and was more lethargic than usual.       In the ED, she was hypoxic to the 80's on room air and placed on 4LNC which improved her SpO2 to 100% she was on BiPAP for a short time then placed back on NC, she was also hypertensive to 175/100 with a HR of 150. EKG revealed Afib with RVR. CT Angio was negative for PE, COVID negative. CXR with R effusion. Labs significant for sodium 149, BNP >12K, Lactate 6, and Cr 1.8. Admitted to telemetry to management of dyspnea secondary to right heart failure secondary to rapid afib and volume overload.       ROS: Denies headache, changes in vision, chest pain, palpitations, shortness of breath, cough, fever, chills, nausea, vomiting, diarrhea, and constipation.  (28 Nov 2020 08:34)        PAST MEDICAL & SURGICAL HISTORY  History of stomach cancer  25 years ago-- had stomach surgery    Hyperlipidemia    CAD S/P percutaneous coronary angioplasty    Atrial fibrillation    HTN (hypertension)    Diverticulitis    S/P small bowel resection    H/O abdominal hysterectomy        FAMILY HISTORY:  FAMILY HISTORY:  No pertinent family history in first degree relatives        SOCIAL HISTORY:  []smoker  []Alcohol  []Drug    ALLERGIES:  penicillin (Anaphylaxis)  penicillin (Unknown)      MEDICATIONS:  MEDICATIONS  (STANDING):  aspirin  chewable 81 milliGRAM(s) Oral daily  chlorhexidine 4% Liquid 1 Application(s) Topical <User Schedule>  lactated ringers. 1000 milliLiter(s) (50 mL/Hr) IV Continuous <Continuous>  levothyroxine 25 MICROGram(s) Oral daily  megestrol 40 milliGRAM(s) Oral daily  metoprolol succinate  milliGRAM(s) Oral daily  mirtazapine 30 milliGRAM(s) Oral at bedtime  multivitamin 1 Tablet(s) Oral daily  pantoprazole    Tablet 40 milliGRAM(s) Oral before breakfast  rivaroxaban 20 milliGRAM(s) Oral daily    MEDICATIONS  (PRN):      HOME MEDICATIONS:  Home Medications:  aspirin 81 mg oral tablet, chewable: 1 tab(s) orally once a day (28 Nov 2020 10:03)  furosemide 40 mg oral tablet: 1 tab(s) orally once a day (28 Nov 2020 10:03)  levothyroxine 25 mcg (0.025 mg) oral tablet: 1 tab(s) orally once a day (28 Nov 2020 10:03)  megestrol 40 mg oral tablet: 1 tab(s) orally once a day (28 Nov 2020 10:03)  Metoprolol Succinate  mg oral tablet, extended release: 1 tab(s) orally once a day (28 Nov 2020 10:03)  mirtazapine 30 mg oral tablet: 1 tab(s) orally once a day (at bedtime) (28 Nov 2020 10:03)  Multiple Vitamins oral tablet: 1 tab(s) orally once a day (28 Nov 2020 10:03)  Protonix 40 mg oral delayed release tablet: 1 tab(s) orally once a day (28 Nov 2020 10:03)  Xarelto: 20 milligram(s) orally once a day (28 Nov 2020 10:03)      VITALS:   T(F): 97.8 (11-27 @ 20:20), Max: 97.8 (11-27 @ 20:20)  HR: 99 (11-28 @ 07:36) (93 - 150)  BP: 168/79 (11-28 @ 06:47) (140/95 - 199/83)  BP(mean): --  RR: 18 (11-28 @ 07:36) (18 - 24)  SpO2: 100% (11-28 @ 07:36) (80% - 100%)    I&O's Summary      REVIEW OF SYSTEMS:  Patient is a poor historian; she denies any symptoms at the time of my encounter with the patient  CONSTITUTIONAL: No weakness, fevers or chills  EYES: No visual changes  ENT: No vertigo or throat pain   NECK: No pain or stiffness  RESPIRATORY: No cough, wheezing, hemoptysis; No shortness of breath  CARDIOVASCULAR: No chest pain or palpitations  GASTROINTESTINAL: No abdominal or epigastric pain. No nausea, vomiting, or hematemesis; No diarrhea or constipation. No melena or hematochezia.  GENITOURINARY: No dysuria, frequency or hematuria  NEUROLOGICAL: No numbness or weakness  SKIN: No itching, no rashes  MSK: No pain    PHYSICAL EXAM:  NEURO: patient is awake , alert and oriented  GEN: Not in acute distress  HEENT: NC/AT  NECK: no thyroid enlargement, no JVD  LUNGS: Clear to auscultation bilaterally   CARDIOVASCULAR: S1/S2 present, irregularly irregular  ABD: Soft, non-tender, non-distended  EXT: No ELVIS  SKIN: Intact    LABS:                        17.3   14.28 )-----------( 303      ( 27 Nov 2020 20:53 )             55.0     11-27    149<H>  |  99  |  59<H>  ----------------------------<  122<H>  3.7   |  26  |  1.8<H>    Ca    10.5<H>      27 Nov 2020 20:53    TPro  8.2<H>  /  Alb  4.3  /  TBili  0.3  /  DBili  x   /  AST  45<H>  /  ALT  35  /  AlkPhos  121<H>  11-27    PT/INR - ( 27 Nov 2020 20:53 )   PT: 17.30 sec;   INR: 1.50 ratio         PTT - ( 27 Nov 2020 20:53 )  PTT:27.8 sec  Troponin T, Serum: 0.04 ng/mL <HH> (11-28-20 @ 11:42)  Troponin T, Serum: 0.07 ng/mL <HH> (11-27-20 @ 20:53)    CARDIAC MARKERS ( 28 Nov 2020 11:42 )  x     / 0.04 ng/mL / x     / x     / x      CARDIAC MARKERS ( 27 Nov 2020 20:53 )  x     / 0.07 ng/mL / x     / x     / x            Troponin trend:    Serum Pro-Brain Natriuretic Peptide: 10257 pg/mL (11-27-20 @ 20:53)          RADIOLOGY:  -CXR:    < from: Xray Chest 1 View-PORTABLE IMMEDIATE (Xray Chest 1 View-PORTABLE IMMEDIATE .) (11.27.20 @ 22:00) >  Impression:    Small right pleural effusion.    < end of copied text >    -TTE:    < from: Transthoracic Echocardiogram (04.26.20 @ 07:57) >  Summary:   1. Left ventricular ejection fraction, by visual estimation, is 45 to 50%.   2. Technically suboptimal study.   3. Mildly decreased global left ventricular systolic function.   4. Right ventricular volume and pressure overload.   5. RV is not well visualized, appears markedly dilated.   6. Left atrium is mildly dilated.   7. Moderately enlarged right atrium.   8. Moderate pleural effusion in the right lateral region.   9. Mild thickening of the anterior and posterior mitral valve leaflets.  10. Moderate mitral valve regurgitation.  11. Moderate tricuspid regurgitation.  12. Estimated pulmonary artery systolic pressure is 51.5 mmHg assuming a right atrial pressure of 15 mmHg, which is consistent with moderate pulmonary hypertension.    < end of copied text >    -CCTA:  -STRESS TEST:  -CATHETERIZATION:      < from: CT Angio Chest w/ IV Cont (11.28.20 @ 04:31) >  IMPRESSION:    No CTA evidence of aortic aneurysm, dissection or intramural hematoma.    Circumferential wall thickening of the distal sigmoid and rectum. Differential includes neoplastic process.    Small right pleural effusion.    Additional Findings/Recommendations After Attending Radiologist Review:  Agree there is sigmoid colonic and rectal wall thickening. Differential diagnosis includes colitis, neoplasm unlikely.      < end of copied text >  ECG: atrial fibrillation with RVR    TELEMETRY EVENTS:

## 2020-11-28 NOTE — CONSULT NOTE ADULT - ASSESSMENT
80 y.o female patient with PMH HFrEF (45-50% 4/2020), Right Heart Failure, CAD s/p remote PCI with stents, HTN, pHTN, MALT lymphoma of stomach s/p chemoradiation in remission, atrial fibrillation on Xarelto, PUD, CKD 3-4, presents with shortness of breath and atrial fibrillation with RVR    # Atrial fibrillation  - Still tachycardic but better controlled  - Continue beta-blockers and anticoagulation with Xarelto    # Elevated troponin  - Trending down  - ACS not likely at this time  - Continue aspirin, beta-blockers    # VANI / history of CHF  - Patient looks euvolemic/hypovolemic on physical exam  - Please repeat labs and chest xray today  - Probably can restart Lasix at home dose tomorrow  - Monitor renal function, electrolytes, daily weight and volume status, strict I&Os; keep I<Os and adjust diuretics accordingly   80 y.o female patient with PMH HFrEF (45-50% 4/2020), Right Heart Failure, CAD s/p remote PCI with stents, HTN, pHTN, MALT lymphoma of stomach s/p chemoradiation in remission, atrial fibrillation on Xarelto, PUD, CKD 3-4, presents with shortness of breath and atrial fibrillation with RVR    # Atrial fibrillation  - Still tachycardic but better controlled  - Continue beta-blockers and anticoagulation with Xarelto  - if still tachycardic, increase metoprolol to 150 mg    # Elevated troponin  - Trending down  - ACS not likely at this time  - Continue aspirin, beta-blockers, AC    # VANI / history of CHF  - Patient looks euvolemic/hypovolemic on physical exam  - Please repeat labs and chest xray today  - Hold Lasix for now, gentle hydration  - Probably can restart Lasix at home dose in 48 hours, depending on the labs  - Monitor renal function, electrolytes, daily weight and volume status, strict I&Os; keep I<Os and adjust diuretics accordingly

## 2020-11-28 NOTE — H&P ADULT - NSHPPHYSICALEXAM_GEN_ALL_CORE
PHYSICAL EXAM:  GENERAL: NAD, lying in bed comfortably  HEAD:  Atraumatic, Normocephalic  EYES: EOMI, PERRLA, conjunctiva and sclera clear  ENT: Moist mucous membranes  NECK: Supple, No JVD  CHEST/LUNG: Clear to auscultation bilaterally; No rales, rhonchi, wheezing, or rubs. Unlabored respirations  HEART: Regular rate and rhythm; No murmurs, rubs, or gallops  ABDOMEN: Bowel sounds present; Soft, Nontender, Nondistended. No hepatomegaly  EXTREMITIES:  2+ Peripheral Pulses, brisk capillary refill. No clubbing, cyanosis, or edema  NERVOUS SYSTEM:  Alert & Oriented X3, speech clear. No deficits   MSK: FROM all 4 extremities, full and equal strength  SKIN: No rashes or lesions PHYSICAL EXAM:  GENERAL: NAD, lying in bed comfortably  HEAD:  Atraumatic, Normocephalic  EYES: EOMI, PERRLA, conjunctiva and sclera clear  ENT: Moist mucous membranes  NECK: Supple, No JVD  CHEST/LUNG: Good air exchange bilaterally, breath sounds reduced over R base  HEART: Regular rate and irregular rhythm; No murmurs, rubs, or gallops  ABDOMEN: Bowel sounds present; Soft, Nontender, Nondistended. No hepatomegaly  EXTREMITIES:  2+ Peripheral Pulses, brisk capillary refill. No clubbing, cyanosis, or edema  NERVOUS SYSTEM:  Alert & Oriented X2, speech clear. No deficits   MSK: FROM all 4 extremitie  SKIN: No rashes or lesions

## 2020-11-29 LAB
ALBUMIN SERPL ELPH-MCNC: 3.4 G/DL — LOW (ref 3.5–5.2)
ALP SERPL-CCNC: 93 U/L — SIGNIFICANT CHANGE UP (ref 30–115)
ALT FLD-CCNC: 22 U/L — SIGNIFICANT CHANGE UP (ref 0–41)
ANION GAP SERPL CALC-SCNC: 16 MMOL/L — HIGH (ref 7–14)
AST SERPL-CCNC: 32 U/L — SIGNIFICANT CHANGE UP (ref 0–41)
BASOPHILS # BLD AUTO: 0.04 K/UL — SIGNIFICANT CHANGE UP (ref 0–0.2)
BASOPHILS NFR BLD AUTO: 0.4 % — SIGNIFICANT CHANGE UP (ref 0–1)
BILIRUB SERPL-MCNC: 0.5 MG/DL — SIGNIFICANT CHANGE UP (ref 0.2–1.2)
BUN SERPL-MCNC: 41 MG/DL — HIGH (ref 10–20)
CALCIUM SERPL-MCNC: 9.8 MG/DL — SIGNIFICANT CHANGE UP (ref 8.5–10.1)
CHLORIDE SERPL-SCNC: 102 MMOL/L — SIGNIFICANT CHANGE UP (ref 98–110)
CO2 SERPL-SCNC: 27 MMOL/L — SIGNIFICANT CHANGE UP (ref 17–32)
CREAT SERPL-MCNC: 1.4 MG/DL — SIGNIFICANT CHANGE UP (ref 0.7–1.5)
EOSINOPHIL # BLD AUTO: 0.14 K/UL — SIGNIFICANT CHANGE UP (ref 0–0.7)
EOSINOPHIL NFR BLD AUTO: 1.4 % — SIGNIFICANT CHANGE UP (ref 0–8)
GLUCOSE BLDC GLUCOMTR-MCNC: 102 MG/DL — HIGH (ref 70–99)
GLUCOSE SERPL-MCNC: 77 MG/DL — SIGNIFICANT CHANGE UP (ref 70–99)
HCT VFR BLD CALC: 41.8 % — SIGNIFICANT CHANGE UP (ref 37–47)
HGB BLD-MCNC: 13.3 G/DL — SIGNIFICANT CHANGE UP (ref 12–16)
IMM GRANULOCYTES NFR BLD AUTO: 0.9 % — HIGH (ref 0.1–0.3)
LYMPHOCYTES # BLD AUTO: 1.46 K/UL — SIGNIFICANT CHANGE UP (ref 1.2–3.4)
LYMPHOCYTES # BLD AUTO: 14.5 % — LOW (ref 20.5–51.1)
MAGNESIUM SERPL-MCNC: 2.2 MG/DL — SIGNIFICANT CHANGE UP (ref 1.8–2.4)
MCHC RBC-ENTMCNC: 29.2 PG — SIGNIFICANT CHANGE UP (ref 27–31)
MCHC RBC-ENTMCNC: 31.8 G/DL — LOW (ref 32–37)
MCV RBC AUTO: 91.7 FL — SIGNIFICANT CHANGE UP (ref 81–99)
METHOD TYPE: SIGNIFICANT CHANGE UP
METHOD TYPE: SIGNIFICANT CHANGE UP
MONOCYTES # BLD AUTO: 0.67 K/UL — HIGH (ref 0.1–0.6)
MONOCYTES NFR BLD AUTO: 6.7 % — SIGNIFICANT CHANGE UP (ref 1.7–9.3)
NEUTROPHILS # BLD AUTO: 7.65 K/UL — HIGH (ref 1.4–6.5)
NEUTROPHILS NFR BLD AUTO: 76.1 % — HIGH (ref 42.2–75.2)
NRBC # BLD: 0 /100 WBCS — SIGNIFICANT CHANGE UP (ref 0–0)
PLATELET # BLD AUTO: 193 K/UL — SIGNIFICANT CHANGE UP (ref 130–400)
POTASSIUM SERPL-MCNC: 4.1 MMOL/L — SIGNIFICANT CHANGE UP (ref 3.5–5)
POTASSIUM SERPL-SCNC: 4.1 MMOL/L — SIGNIFICANT CHANGE UP (ref 3.5–5)
PROT SERPL-MCNC: 6.2 G/DL — SIGNIFICANT CHANGE UP (ref 6–8)
RBC # BLD: 4.56 M/UL — SIGNIFICANT CHANGE UP (ref 4.2–5.4)
RBC # FLD: 16.4 % — HIGH (ref 11.5–14.5)
SODIUM SERPL-SCNC: 145 MMOL/L — SIGNIFICANT CHANGE UP (ref 135–146)
WBC # BLD: 10.05 K/UL — SIGNIFICANT CHANGE UP (ref 4.8–10.8)
WBC # FLD AUTO: 10.05 K/UL — SIGNIFICANT CHANGE UP (ref 4.8–10.8)

## 2020-11-29 PROCEDURE — 99223 1ST HOSP IP/OBS HIGH 75: CPT | Mod: AI

## 2020-11-29 PROCEDURE — 99232 SBSQ HOSP IP/OBS MODERATE 35: CPT

## 2020-11-29 PROCEDURE — 93306 TTE W/DOPPLER COMPLETE: CPT | Mod: 26

## 2020-11-29 RX ADMIN — Medication 250 MILLIGRAM(S): at 22:17

## 2020-11-29 RX ADMIN — Medication 25 MICROGRAM(S): at 05:46

## 2020-11-29 RX ADMIN — Medication 1 TABLET(S): at 11:41

## 2020-11-29 RX ADMIN — PANTOPRAZOLE SODIUM 40 MILLIGRAM(S): 20 TABLET, DELAYED RELEASE ORAL at 05:47

## 2020-11-29 RX ADMIN — RIVAROXABAN 20 MILLIGRAM(S): KIT at 17:07

## 2020-11-29 RX ADMIN — MIRTAZAPINE 30 MILLIGRAM(S): 45 TABLET, ORALLY DISINTEGRATING ORAL at 22:16

## 2020-11-29 RX ADMIN — MEGESTROL ACETATE 40 MILLIGRAM(S): 40 SUSPENSION ORAL at 11:41

## 2020-11-29 RX ADMIN — Medication 100 MILLIGRAM(S): at 05:46

## 2020-11-29 RX ADMIN — Medication 81 MILLIGRAM(S): at 11:41

## 2020-11-29 NOTE — PHYSICAL THERAPY INITIAL EVALUATION ADULT - PATIENT PROFILE REVIEW, REHAB EVAL
Pt. unavailable for PT Eval, pt. currently off unit at echo. Will follow-up when pt. is available./yes

## 2020-11-29 NOTE — PROGRESS NOTE ADULT - SUBJECTIVE AND OBJECTIVE BOX
Patient is a 80y old  Female who presents with a chief complaint of Shortness of breath (28 Nov 2020 14:15)          SUBJ:  Patient seen and examined.  Clinically better.  HR is controlled.    MEDICATIONS  (STANDING):  aspirin  chewable 81 milliGRAM(s) Oral daily  chlorhexidine 4% Liquid 1 Application(s) Topical <User Schedule>  levothyroxine 25 MICROGram(s) Oral daily  megestrol 40 milliGRAM(s) Oral daily  metoprolol succinate  milliGRAM(s) Oral daily  mirtazapine 30 milliGRAM(s) Oral at bedtime  multivitamin 1 Tablet(s) Oral daily  pantoprazole    Tablet 40 milliGRAM(s) Oral before breakfast  rivaroxaban 20 milliGRAM(s) Oral daily  vancomycin  IVPB 1000 milliGRAM(s) IV Intermittent every 24 hours  vancomycin  IVPB        MEDICATIONS  (PRN):            Vital Signs Last 24 Hrs  T(C): 35.9 (29 Nov 2020 14:50), Max: 35.9 (29 Nov 2020 14:50)  T(F): 96.6 (29 Nov 2020 14:50), Max: 96.6 (29 Nov 2020 14:50)  HR: 94 (29 Nov 2020 14:50) (63 - 100)  BP: 124/92 (29 Nov 2020 14:50) (124/92 - 197/93)  BP(mean): --  RR: 18 (29 Nov 2020 14:50) (18 - 18)  SpO2: 100% (28 Nov 2020 20:24) (100% - 100%)      PHYSICAL EXAM:    GEN: NAD  HEENT: NC/AT  Neck: No JVD  CV: reg, S1-S2  Lungs: CTAB  Abd: Soft, non-tender  Ext: No edema      I&O's Summary    28 Nov 2020 07:01  -  29 Nov 2020 07:00  --------------------------------------------------------  IN: 370 mL / OUT: 500 mL / NET: -130 mL    29 Nov 2020 07:01  -  29 Nov 2020 15:15  --------------------------------------------------------  IN: 0 mL / OUT: 600 mL / NET: -600 mL    	        ECG:  < from: 12 Lead ECG (11.28.20 @ 15:40) >   Normal sinus rhythm  Normal ECG      < end of copied text >    TTE:    < from: TTE Echo Complete w/o Contrast w/ Doppler (11.29.20 @ 12:45) >    Summary:   1. Left ventricular ejection fraction, by visual estimation, is 35 to 40%.   2. Moderately enlarged left atrium.   3. Moderately enlarged right atrium.   4. Mitral annular calcification.   5. Moderate mitral valve regurgitation.   6. Mild tricuspid regurgitation.    PHYSICIAN INTERPRETATION:  Left Ventricle: Left ventricular ejection fraction, by visual estimation, is 35 to 40%.  Right Ventricle: Normal right ventricular size and function.  Left Atrium: Moderately enlarged left atrium.  Right Atrium: Moderately enlarged right atrium.  Mitral Valve: Structurally normal mitral valve, with normal leaflet excursion. There is mitral annular calcification. Moderate mitral valve regurgitation is seen.  Tricuspid Valve: Structurally normal tricuspid valve, with normal leaflet excursion. Mild tricuspid regurgitation is visualized.  Aortic Valve: The aortic valve is trileaflet. No evidence of aortic stenosis. No evidence of aortic valve regurgitation is seen.  Pulmonic Valve: The pulmonic valve was not well visualized.  Additional Comments: A pacer wire is visualized in the right ventricle.    < end of copied text >       LABS:                        13.3   10.05 )-----------( 193      ( 29 Nov 2020 05:03 )             41.8     11-29    145  |  102  |  41<H>  ----------------------------<  77  4.1   |  27  |  1.4    Ca    9.8      29 Nov 2020 05:03  Mg     2.2     11-29    TPro  6.2  /  Alb  3.4<L>  /  TBili  0.5  /  DBili  x   /  AST  32  /  ALT  22  /  AlkPhos  93  11-29    CARDIAC MARKERS ( 28 Nov 2020 11:42 )  x     / 0.04 ng/mL / x     / x     / x      CARDIAC MARKERS ( 27 Nov 2020 20:53 )  x     / 0.07 ng/mL / x     / x     / x          PT/INR - ( 27 Nov 2020 20:53 )   PT: 17.30 sec;   INR: 1.50 ratio         PTT - ( 27 Nov 2020 20:53 )  PTT:27.8 sec      BNP  RADIOLOGY & ADDITIONAL STUDIES:      IMPRESSION AND PLAN:

## 2020-11-29 NOTE — PHYSICAL THERAPY INITIAL EVALUATION ADULT - GENERAL OBSERVATIONS, REHAB EVAL
Pt rec'd in bed awake, pleasant and cooperative c/o being very tired and weak.  Pt left as received and in No apparent distress.  +tele, +O2(2L)

## 2020-11-29 NOTE — PHYSICAL THERAPY INITIAL EVALUATION ADULT - IMPAIRMENTS CONTRIBUTING IMPAIRED BED MOBILITY, REHAB EVAL
decreased ROM/abnormal muscle tone/decreased flexibility/decreased strength/impaired postural control

## 2020-11-29 NOTE — PHYSICAL THERAPY INITIAL EVALUATION ADULT - IMPAIRMENTS FOUND, PT EVAL
gait, locomotion, and balance/tone/integumentary integrity/joint integrity and mobility/posture/gross motor/aerobic capacity/endurance/fine motor/muscle strength/ROM

## 2020-11-29 NOTE — PHYSICAL THERAPY INITIAL EVALUATION ADULT - IMPAIRED TRANSFERS: SIT/STAND, REHAB EVAL
decreased ROM/abnormal muscle tone/narrow base of support/decreased flexibility/impaired motor control/decreased strength

## 2020-11-29 NOTE — PROGRESS NOTE ADULT - ASSESSMENT
80 y.o female patient with PMH HFrEF (45-50% 4/2020), Right Heart Failure, CAD s/p remote PCI with stents, HTN, pHTN, MALT lymphoma of stomach s/p chemoradiation in remission, atrial fibrillation on Xarelto, PUD, CKD 3-4, presents with shortness of breath and atrial fibrillation with RVR    # Atrial fibrillation  - HR better controlled  - Continue beta-blockers and anticoagulation with Xarelto      # Elevated troponin  - Trending down  - ACS not likely at this time  - Continue aspirin, beta-blockers, AC    # VANI / history of CHF  - Patient looks euvolemic/hypovolemic on physical exam  - Please repeat labs and chest xray today  - Hold Lasix for now, gentle hydration  - Probably can restart Lasix at home dose in 24 hours, depending on the labs  - Monitor renal function, electrolytes, daily weight and volume status  - If stable, d/c planning in 24-48 hours

## 2020-11-29 NOTE — PATIENT PROFILE ADULT - SURGICAL SITE INCISION
Pt BIBEMS for intoxication, tripped/fell after leaving bar, witnessed by people who stayed with him until EMS arrived. Witnesses state he fell backwards, hit his head, and never lost consciousness, abrasion to forehead and swelling to back of head.
no

## 2020-11-29 NOTE — PHYSICAL THERAPY INITIAL EVALUATION ADULT - CRITERIA FOR SKILLED THERAPEUTIC INTERVENTIONS
predicted duration of therapy intervention/functional limitations in following categories/anticipated discharge recommendation/therapy frequency/anticipated equipment needs at discharge/risk reduction/prevention/rehab potential/impairments found

## 2020-11-29 NOTE — PHYSICAL THERAPY INITIAL EVALUATION ADULT - PLANNED THERAPY INTERVENTIONS, PT EVAL
gait training/bed mobility training/ROM/strengthening/stretching/joint mobilization/transfer training/balance training

## 2020-11-30 LAB
-  AMPICILLIN/SULBACTAM: SIGNIFICANT CHANGE UP
-  AMPICILLIN/SULBACTAM: SIGNIFICANT CHANGE UP
-  CEFAZOLIN: SIGNIFICANT CHANGE UP
-  CEFAZOLIN: SIGNIFICANT CHANGE UP
-  CLINDAMYCIN: SIGNIFICANT CHANGE UP
-  CLINDAMYCIN: SIGNIFICANT CHANGE UP
-  ERYTHROMYCIN: SIGNIFICANT CHANGE UP
-  ERYTHROMYCIN: SIGNIFICANT CHANGE UP
-  GENTAMICIN: SIGNIFICANT CHANGE UP
-  GENTAMICIN: SIGNIFICANT CHANGE UP
-  OXACILLIN: SIGNIFICANT CHANGE UP
-  OXACILLIN: SIGNIFICANT CHANGE UP
-  PENICILLIN: SIGNIFICANT CHANGE UP
-  PENICILLIN: SIGNIFICANT CHANGE UP
-  RIFAMPIN: SIGNIFICANT CHANGE UP
-  RIFAMPIN: SIGNIFICANT CHANGE UP
-  TETRACYCLINE: SIGNIFICANT CHANGE UP
-  TETRACYCLINE: SIGNIFICANT CHANGE UP
-  TRIMETHOPRIM/SULFAMETHOXAZOLE: SIGNIFICANT CHANGE UP
-  TRIMETHOPRIM/SULFAMETHOXAZOLE: SIGNIFICANT CHANGE UP
-  VANCOMYCIN: SIGNIFICANT CHANGE UP
-  VANCOMYCIN: SIGNIFICANT CHANGE UP
ALBUMIN SERPL ELPH-MCNC: 3.2 G/DL — LOW (ref 3.5–5.2)
ALP SERPL-CCNC: 80 U/L — SIGNIFICANT CHANGE UP (ref 30–115)
ALT FLD-CCNC: 18 U/L — SIGNIFICANT CHANGE UP (ref 0–41)
ANION GAP SERPL CALC-SCNC: 13 MMOL/L — SIGNIFICANT CHANGE UP (ref 7–14)
AST SERPL-CCNC: 27 U/L — SIGNIFICANT CHANGE UP (ref 0–41)
BILIRUB SERPL-MCNC: 0.4 MG/DL — SIGNIFICANT CHANGE UP (ref 0.2–1.2)
BUN SERPL-MCNC: 28 MG/DL — HIGH (ref 10–20)
CALCIUM SERPL-MCNC: 8.6 MG/DL — SIGNIFICANT CHANGE UP (ref 8.5–10.1)
CHLORIDE SERPL-SCNC: 97 MMOL/L — LOW (ref 98–110)
CO2 SERPL-SCNC: 26 MMOL/L — SIGNIFICANT CHANGE UP (ref 17–32)
CREAT SERPL-MCNC: 1.4 MG/DL — SIGNIFICANT CHANGE UP (ref 0.7–1.5)
CULTURE RESULTS: SIGNIFICANT CHANGE UP
CULTURE RESULTS: SIGNIFICANT CHANGE UP
GLUCOSE SERPL-MCNC: 101 MG/DL — HIGH (ref 70–99)
HCT VFR BLD CALC: 41.1 % — SIGNIFICANT CHANGE UP (ref 37–47)
HGB BLD-MCNC: 13.5 G/DL — SIGNIFICANT CHANGE UP (ref 12–16)
MAGNESIUM SERPL-MCNC: 2.1 MG/DL — SIGNIFICANT CHANGE UP (ref 1.8–2.4)
MCHC RBC-ENTMCNC: 29.7 PG — SIGNIFICANT CHANGE UP (ref 27–31)
MCHC RBC-ENTMCNC: 32.8 G/DL — SIGNIFICANT CHANGE UP (ref 32–37)
MCV RBC AUTO: 90.3 FL — SIGNIFICANT CHANGE UP (ref 81–99)
METHOD TYPE: SIGNIFICANT CHANGE UP
NRBC # BLD: 0 /100 WBCS — SIGNIFICANT CHANGE UP (ref 0–0)
ORGANISM # SPEC MICROSCOPIC CNT: SIGNIFICANT CHANGE UP
PLATELET # BLD AUTO: 189 K/UL — SIGNIFICANT CHANGE UP (ref 130–400)
POTASSIUM SERPL-MCNC: 3.8 MMOL/L — SIGNIFICANT CHANGE UP (ref 3.5–5)
POTASSIUM SERPL-SCNC: 3.8 MMOL/L — SIGNIFICANT CHANGE UP (ref 3.5–5)
PROT SERPL-MCNC: 5.8 G/DL — LOW (ref 6–8)
RBC # BLD: 4.55 M/UL — SIGNIFICANT CHANGE UP (ref 4.2–5.4)
RBC # FLD: 16.8 % — HIGH (ref 11.5–14.5)
SODIUM SERPL-SCNC: 136 MMOL/L — SIGNIFICANT CHANGE UP (ref 135–146)
SPECIMEN SOURCE: SIGNIFICANT CHANGE UP
SPECIMEN SOURCE: SIGNIFICANT CHANGE UP
TSH SERPL-MCNC: 3.49 UIU/ML — SIGNIFICANT CHANGE UP (ref 0.27–4.2)
WBC # BLD: 9.45 K/UL — SIGNIFICANT CHANGE UP (ref 4.8–10.8)
WBC # FLD AUTO: 9.45 K/UL — SIGNIFICANT CHANGE UP (ref 4.8–10.8)

## 2020-11-30 PROCEDURE — 99232 SBSQ HOSP IP/OBS MODERATE 35: CPT

## 2020-11-30 RX ORDER — LABETALOL HCL 100 MG
10 TABLET ORAL ONCE
Refills: 0 | Status: COMPLETED | OUTPATIENT
Start: 2020-11-30 | End: 2020-11-30

## 2020-11-30 RX ORDER — FUROSEMIDE 40 MG
40 TABLET ORAL DAILY
Refills: 0 | Status: DISCONTINUED | OUTPATIENT
Start: 2020-11-30 | End: 2020-12-03

## 2020-11-30 RX ADMIN — RIVAROXABAN 20 MILLIGRAM(S): KIT at 19:14

## 2020-11-30 RX ADMIN — Medication 1 TABLET(S): at 12:00

## 2020-11-30 RX ADMIN — MEGESTROL ACETATE 40 MILLIGRAM(S): 40 SUSPENSION ORAL at 12:00

## 2020-11-30 RX ADMIN — Medication 81 MILLIGRAM(S): at 12:00

## 2020-11-30 RX ADMIN — Medication 25 MICROGRAM(S): at 06:05

## 2020-11-30 RX ADMIN — MIRTAZAPINE 30 MILLIGRAM(S): 45 TABLET, ORALLY DISINTEGRATING ORAL at 21:58

## 2020-11-30 RX ADMIN — PANTOPRAZOLE SODIUM 40 MILLIGRAM(S): 20 TABLET, DELAYED RELEASE ORAL at 06:05

## 2020-11-30 RX ADMIN — Medication 100 MILLIGRAM(S): at 06:05

## 2020-11-30 RX ADMIN — Medication 10 MILLIGRAM(S): at 01:01

## 2020-11-30 NOTE — PROGRESS NOTE ADULT - SUBJECTIVE AND OBJECTIVE BOX
Patient is a 80y old  Female who presents with a chief complaint of Shortness of breath (30 Nov 2020 13:08)    HPI:  Chief Complaint: Shortness of breath and AMS      Past Medical History: HFrEF (45-50% 4/2020), Right Heart Failure, CAD s/p remote PCI with stents, HTN, pHTN, 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 Her daughter noticed and asked if her mother (the patient) was okay. Her mother said she was okay, but did not want to get off the toilet. She sat there for awhile until her daughter insisted she get up from the toilet. She was too weak to get up after trying multiple times, and her lips began to turn blue per the daughter and "wasn't herself". She called EMS and was hypoxic to the 80's and tachy to 150. She brought her to the ED for hypoxia and AMS. Of note, per the daughter, Lexii, she was not eating and drinking well the last few days and was more lethargic than usual.       In the ED, she was hypoxic to the 80's on room air and placed on 4LNC which improved her SpO2 to 100% she was on BiPAP for a short time then placed back on NC, she was also hypertensive to 175/100 with a HR of 150. EKG revealed Afib with RVR. CT Angio was negative for PE, COVID negative. CXR with R effusion. Labs significant for sodium 149, BNP >12K, Lactate 6, and Cr 1.8. Admitted to telemetry to management of dyspnea secondary to right heart failure secondary to rapid afib and volume overload.       ROS: Denies headache, changes in vision, chest pain, palpitations, shortness of breath, cough, fever, chills, nausea, vomiting, diarrhea, and constipation.  (28 Nov 2020 08:34)      SUBJ:  Patient seen and examined. Clinically stable. Echo noted.      MEDICATIONS  (STANDING):  aspirin  chewable 81 milliGRAM(s) Oral daily  chlorhexidine 4% Liquid 1 Application(s) Topical <User Schedule>  levothyroxine 25 MICROGram(s) Oral daily  megestrol 40 milliGRAM(s) Oral daily  metoprolol succinate  milliGRAM(s) Oral daily  mirtazapine 30 milliGRAM(s) Oral at bedtime  multivitamin 1 Tablet(s) Oral daily  pantoprazole    Tablet 40 milliGRAM(s) Oral before breakfast  rivaroxaban 20 milliGRAM(s) Oral daily    MEDICATIONS  (PRN):            Vital Signs Last 24 Hrs  T(C): 36.4 (30 Nov 2020 15:00), Max: 36.8 (30 Nov 2020 05:18)  T(F): 97.5 (30 Nov 2020 15:00), Max: 98.2 (30 Nov 2020 05:18)  HR: 87 (30 Nov 2020 15:00) (87 - 123)  BP: 123/67 (30 Nov 2020 15:00) (103/56 - 202/95)  BP(mean): --  RR: 18 (30 Nov 2020 15:00) (18 - 18)  SpO2: 99% (30 Nov 2020 15:00) (89% - 99%)      PHYSICAL EXAM:    GEN: AAO x 3, NAD  HEENT: NC/AT, PERRL  Neck: No JVD, no bruits  CV: Reg, S1-S2, no murmur  Lungs: CTAB  Abd: Soft, non-tender  Ext: No edema      I&O's Summary    29 Nov 2020 07:01  -  30 Nov 2020 07:00  --------------------------------------------------------  IN: 0 mL / OUT: 1300 mL / NET: -1300 mL    30 Nov 2020 07:01  -  30 Nov 2020 17:29  --------------------------------------------------------  IN: 240 mL / OUT: 1000 mL / NET: -760 mL    	        ECG:  < from: 12 Lead ECG (11.28.20 @ 15:40) >   Normal sinus rhythm  Normal ECG    < end of copied text >    TTE:  < from: TTE Echo Complete w/o Contrast w/ Doppler (11.29.20 @ 12:45) >  Summary:   1. Left ventricular ejection fraction, by visual estimation, is 35 to 40%.   2. Moderately enlarged left atrium.   3. Moderately enlarged right atrium.   4. Mitral annular calcification.   5. Moderate mitral valve regurgitation.   6. Mild tricuspid regurgitation.    PHYSICIAN INTERPRETATION:  Left Ventricle: Left ventricular ejection fraction, by visual estimation, is 35 to 40%.  Right Ventricle: Normal right ventricular size and function.  Left Atrium: Moderately enlarged left atrium.  Right Atrium: Moderately enlarged right atrium.  Mitral Valve: Structurally normal mitral valve, with normal leaflet excursion. There is mitral annular calcification. Moderate mitral valve regurgitation is seen.  Tricuspid Valve: Structurally normal tricuspid valve, with normal leaflet excursion. Mild tricuspid regurgitation is visualized.  Aortic Valve: The aortic valve is trileaflet. No evidence of aortic stenosis. No evidence of aortic valve regurgitation is seen.  Pulmonic Valve: The pulmonic valve was not well visualized.  Additional Comments: A pacer wire is visualized in the right ventricle.        LABS:                        13.3   10.05 )-----------( 193      ( 29 Nov 2020 05:03 )             41.8     11-29    145  |  102  |  41<H>  ----------------------------<  77  4.1   |  27  |  1.4    Ca    9.8      29 Nov 2020 05:03  Mg     2.2     11-29    TPro  6.2  /  Alb  3.4<L>  /  TBili  0.5  /  DBili  x   /  AST  32  /  ALT  22  /  AlkPhos  93  11-29              BNP  RADIOLOGY & ADDITIONAL STUDIES:      IMPRESSION AND PLAN:

## 2020-11-30 NOTE — PROGRESS NOTE ADULT - SUBJECTIVE AND OBJECTIVE BOX
*** This is an incomplete note ***    SUBJECTIVE:   LENGTH OF HOSPITAL STAY: 2d    CHIEF COMPLAINT:  Patient is a 80y old  Female who presents with a chief complaint of Shortness of breath (29 Nov 2020 15:15)      Events over the past 24 hours:  Patient was seen and examined at the bedside this morning.    REVIEW OF SYSTEMS  Negative Except as mentioned above.    ALLERGIES:  penicillin (Anaphylaxis)  penicillin (Unknown)        MEDICATIONS:  STANDING MEDICATIONS  aspirin  chewable 81 milliGRAM(s) Oral daily  chlorhexidine 4% Liquid 1 Application(s) Topical <User Schedule>  levothyroxine 25 MICROGram(s) Oral daily  megestrol 40 milliGRAM(s) Oral daily  metoprolol succinate  milliGRAM(s) Oral daily  mirtazapine 30 milliGRAM(s) Oral at bedtime  multivitamin 1 Tablet(s) Oral daily  pantoprazole    Tablet 40 milliGRAM(s) Oral before breakfast  rivaroxaban 20 milliGRAM(s) Oral daily  vancomycin  IVPB 1000 milliGRAM(s) IV Intermittent every 24 hours  vancomycin  IVPB        PRN MEDICATIONS        OBJECTIVE:  VITALS:   T(F): 98.2 (11-30-20 @ 05:18), Max: 98.2 (11-30-20 @ 05:18)  HR: 95 (11-30-20 @ 05:18) (93 - 123)  BP: 103/56 (11-30-20 @ 05:18) (103/56 - 202/95)  BP(mean): --  RR: 18 (11-30-20 @ 05:18) (18 - 18)  SpO2: 96% (11-30-20 @ 02:06) (89% - 100%)    I&O's:   I&O's Summary    29 Nov 2020 07:01  -  30 Nov 2020 07:00  --------------------------------------------------------  IN: 0 mL / OUT: 1300 mL / NET: -1300 mL        PHYSICAL EXAM:    LABS:                        13.3   10.05 )-----------( 193      ( 29 Nov 2020 05:03 )             41.8             11-29    145  |  102  |  41<H>  ----------------------------<  77  4.1   |  27  |  1.4    Ca    9.8      29 Nov 2020 05:03  Mg     2.2     11-29    TPro  6.2  /  Alb  3.4<L>  /  TBili  0.5  /  DBili  x   /  AST  32  /  ALT  22  /  AlkPhos  93  11-29    LIVER FUNCTIONS - ( 29 Nov 2020 05:03 )  Alb: 3.4 g/dL / Pro: 6.2 g/dL / ALK PHOS: 93 U/L / ALT: 22 U/L / AST: 32 U/L / GGT: x                   CARDIAC MARKERS ( 28 Nov 2020 11:42 )  x     / 0.04 ng/mL / x     / x     / x                Culture - Blood (collected 28 Nov 2020 23:54)  Source: .Blood None  Preliminary Report (30 Nov 2020 07:01):    No growth to date.    Culture - Blood (collected 27 Nov 2020 20:53)  Source: .Blood Blood  Gram Stain (28 Nov 2020 17:58):    Growth in aerobic and anaerobic bottles: Gram Positive Cocci in Clusters  Preliminary Report (29 Nov 2020 17:05):    Growth in aerobic and anaerobic bottles: Staphylococcus capitis    "Due to technical problems, Proteus sp. will Not be reported as part of    the BCID panel until further notice"    ***Blood Panel PCR results on this specimen are available    approximately3 hours after the Gram stain result.***    Gram stain, PCR, and/or culture results may not always    correspond due to difference in methodologies.    ************************************************************    This PCR assay was performed using [employer].    The following targets are tested for: Enterococcus,    vancomycin resistant enterococci, Listeria monocytogenes,    coagulase negative staphylococci, S. aureus,    methicillin resistant S. aureus, Streptococcus agalactiae    (Group B), S. pneumoniae, S. pyogenes (Group A),    Acinetobacter baumannii, Enterobacter cloacae, E. coli,    Klebsiella oxytoca, K. pneumoniae, Proteus sp.,    Serratia marcescens, Haemophilus influenzae,    Neisseria meningitidis, Pseudomonas aeruginosa, Candida    albicans, C. glabrata, C krusei, C parapsilosis,    C. tropicalis and the KPC resistance gene.  Organism: Blood Culture PCR (28 Nov 2020 19:13)  Organism: Blood Culture PCR (28 Nov 2020 19:13)    Culture - Blood (collected 27 Nov 2020 20:53)  Source: .Blood Blood  Gram Stain (28 Nov 2020 20:50):    Growth in anaerobic bottle: Gram Positive Cocci in Clusters    Growth in aerobic bottle: Gram Positive Cocci in Clusters  Preliminary Report (29 Nov 2020 18:03):    Growth in anaerobic bottle: Staphylococcus capitis  Organism: Staphylococcus capitis  Staphylococcus capitis (29 Nov 2020 18:02)  Organism: Staphylococcus capitis (29 Nov 2020 18:02)  Organism: Staphylococcus capitis (29 Nov 2020 18:02)      CAPILLARY BLOOD GLUCOSE            RADIOLOGY & ADDITIONAL TESTS:    CT Angio Chest w/ IV Cont:   EXAM:  CT ANGIO ABD PELVIS (W)AW IC        EXAM:  CT ANGIO CHEST (W)AW IC            PROCEDURE DATE:  11/28/2020            INTERPRETATION:  CLINICAL HISTORY/REASON FOR EXAM: Dissection. History of small bowel lymphoma.    TECHNIQUE: Contiguous axial CT images of the chest without IV contrast. Post-administration of Omnipaque 350, CT angiographic axial images of the aorta from the chest to the pelvis were obtained. Coronal and sagittal reformats were performed. 3-D reconstruction / MIPs was obtained.    COMPARISON: CT chest abdomen pelvis 5/16/2018. PET 8/15/2019.    FINDINGS:    VASCULATURE: No evidence of aortic aneurysm, dissection or intramural hematoma. Patent celiac, SMA and ROLANDO branches. Diffuse episodic disease of aorta and its branches. Occluded left renal artery.    LUNGS, PLEURA, AIRWAYS: Patent proximal tracheobronchial tree. Small right pleural effusion. No pneumothorax or focal consolidation. Right subsegmental atelectasis    THORACIC NODES: No mediastinal, hilar, supraclavicular, or axillary lymphadenopathy.    MEDIASTINUM/GREAT VESSELS: No pericardial effusion. Heart size is within normal limits. The aorta and main pulmonary artery are of normal caliber.    HEPATOBILIARY: Cholelithiasis.    SPLEEN: Unremarkable.    PANCREAS: Unremarkable.    ADRENAL GLANDS: Unremarkable.    KIDNEYS: Atrophic left kidney likely secondary to a chronically occluded left renal artery. No hydronephrosis.    ABDOMINAL NODES: No lymphadenopathy.    VISUALIZED PERITONEUM /MESENTERY/BOWEL/PELVIS: Circumferential bowel wall thickening of the distal sigmoid and rectum. No evidence of bowel obstruction or free air. Surgical sutures are noted in the right lower quadrant.    BONES/SOFT TISSUES:Multilevel degenerative changes of the spine. No acute osseous abnormalities      IMPRESSION:    No CTA evidence of aortic aneurysm, dissection or intramural hematoma.    Circumferential wall thickening of the distal sigmoid and rectum. Differential includes neoplastic process.    Small right pleural effusion.    Additional Findings/Recommendations After Attending Radiologist Review:  Agree there is sigmoid colonic and rectal wall thickening. Differential diagnosis includes colitis, neoplasm unlikely.            SELENA PARRA M.D., RESIDENT RADIOLOGIST  This document has been electronically signed.  FERMIN CASANOVA MD; Attending Radiologist  This document has been electronically signed. Nov 28 2020  4:57AM (11-28-20 @ 04:31)                     *** This is an incomplete note ***    SUBJECTIVE:   LENGTH OF HOSPITAL STAY: 2d    CHIEF COMPLAINT:  Patient is a 80y old  Female who presents with a chief complaint of Shortness of breath (29 Nov 2020 15:15)      Events over the past 24 hours:  Patient was seen and examined at the bedside this morning. There were no acute events overnight. She is lying comfortably on the bed.     REVIEW OF SYSTEMS  Negative Except as mentioned above.    ALLERGIES:  penicillin (Anaphylaxis)  penicillin (Unknown)        MEDICATIONS:  STANDING MEDICATIONS  aspirin  chewable 81 milliGRAM(s) Oral daily  chlorhexidine 4% Liquid 1 Application(s) Topical <User Schedule>  levothyroxine 25 MICROGram(s) Oral daily  megestrol 40 milliGRAM(s) Oral daily  metoprolol succinate  milliGRAM(s) Oral daily  mirtazapine 30 milliGRAM(s) Oral at bedtime  multivitamin 1 Tablet(s) Oral daily  pantoprazole    Tablet 40 milliGRAM(s) Oral before breakfast  rivaroxaban 20 milliGRAM(s) Oral daily  vancomycin  IVPB 1000 milliGRAM(s) IV Intermittent every 24 hours  vancomycin  IVPB        PRN MEDICATIONS        OBJECTIVE:  VITALS:   T(F): 98.2 (11-30-20 @ 05:18), Max: 98.2 (11-30-20 @ 05:18)  HR: 95 (11-30-20 @ 05:18) (93 - 123)  BP: 103/56 (11-30-20 @ 05:18) (103/56 - 202/95)  BP(mean): --  RR: 18 (11-30-20 @ 05:18) (18 - 18)  SpO2: 96% (11-30-20 @ 02:06) (89% - 100%)    I&O's:   I&O's Summary    29 Nov 2020 07:01  -  30 Nov 2020 07:00  --------------------------------------------------------  IN: 0 mL / OUT: 1300 mL / NET: -1300 mL        PHYSICAL EXAM:  General: Not in distress; Pallor (-), Icterus (-), Cyanosis (-), Clubbing (-)  HEENT: Pupils equal, round and reactive to light symmetrically, EOM - Normal bilaterally; Hearing - b/l normal; No external discharge noted, JVD (-), Lymphadenopathy(-)  PULM: Bilaterally equal and clear breath sounds, no wheeze, rubs or crackles.   CVS: Normal S1 and S2, no murmurs, rubs, or gallops.   GI: Soft, nondistended, nontender, BS +  MSK: Edema (+), no joint or muscle tenderness  SKIN: Warm and well perfused, no rashes noted  NEURO:  Alert and Oriented x 3; Cranial Nerves are all grossly intact; Normal strength and sensation in all four extremities.    LABS:                        13.3   10.05 )-----------( 193      ( 29 Nov 2020 05:03 )             41.8             11-29    145  |  102  |  41<H>  ----------------------------<  77  4.1   |  27  |  1.4    Ca    9.8      29 Nov 2020 05:03  Mg     2.2     11-29    TPro  6.2  /  Alb  3.4<L>  /  TBili  0.5  /  DBili  x   /  AST  32  /  ALT  22  /  AlkPhos  93  11-29    LIVER FUNCTIONS - ( 29 Nov 2020 05:03 )  Alb: 3.4 g/dL / Pro: 6.2 g/dL / ALK PHOS: 93 U/L / ALT: 22 U/L / AST: 32 U/L / GGT: x                   CARDIAC MARKERS ( 28 Nov 2020 11:42 )  x     / 0.04 ng/mL / x     / x     / x                Culture - Blood (collected 28 Nov 2020 23:54)  Source: .Blood None  Preliminary Report (30 Nov 2020 07:01):    No growth to date.    Culture - Blood (collected 27 Nov 2020 20:53)  Source: .Blood Blood  Gram Stain (28 Nov 2020 17:58):    Growth in aerobic and anaerobic bottles: Gram Positive Cocci in Clusters  Preliminary Report (29 Nov 2020 17:05):    Growth in aerobic and anaerobic bottles: Staphylococcus capitis    "Due to technical problems, Proteus sp. will Not be reported as part of    the BCID panel until further notice"    ***Blood Panel PCR results on this specimen are available    approximately3 hours after the Gram stain result.***    Gram stain, PCR, and/or culture results may not always    correspond due to difference in methodologies.    ************************************************************    This PCR assay was performed using [employer].    The following targets are tested for: Enterococcus,    vancomycin resistant enterococci, Listeria monocytogenes,    coagulase negative staphylococci, S. aureus,    methicillin resistant S. aureus, Streptococcus agalactiae    (Group B), S. pneumoniae, S. pyogenes (Group A),    Acinetobacter baumannii, Enterobacter cloacae, E. coli,    Klebsiella oxytoca, K. pneumoniae, Proteus sp.,    Serratia marcescens, Haemophilus influenzae,    Neisseria meningitidis, Pseudomonas aeruginosa, Candida    albicans, C. glabrata, C krusei, C parapsilosis,    C. tropicalis and the KPC resistance gene.  Organism: Blood Culture PCR (28 Nov 2020 19:13)  Organism: Blood Culture PCR (28 Nov 2020 19:13)    Culture - Blood (collected 27 Nov 2020 20:53)  Source: .Blood Blood  Gram Stain (28 Nov 2020 20:50):    Growth in anaerobic bottle: Gram Positive Cocci in Clusters    Growth in aerobic bottle: Gram Positive Cocci in Clusters  Preliminary Report (29 Nov 2020 18:03):    Growth in anaerobic bottle: Staphylococcus capitis  Organism: Staphylococcus capitis  Staphylococcus capitis (29 Nov 2020 18:02)  Organism: Staphylococcus capitis (29 Nov 2020 18:02)  Organism: Staphylococcus capitis (29 Nov 2020 18:02)      CAPILLARY BLOOD GLUCOSE            RADIOLOGY & ADDITIONAL TESTS:    CT Angio Chest w/ IV Cont:   EXAM:  CT ANGIO ABD PELVIS (W)AW IC        EXAM:  CT ANGIO CHEST (W)AW IC            PROCEDURE DATE:  11/28/2020            INTERPRETATION:  CLINICAL HISTORY/REASON FOR EXAM: Dissection. History of small bowel lymphoma.    TECHNIQUE: Contiguous axial CT images of the chest without IV contrast. Post-administration of Omnipaque 350, CT angiographic axial images of the aorta from the chest to the pelvis were obtained. Coronal and sagittal reformats were performed. 3-D reconstruction / MIPs was obtained.    COMPARISON: CT chest abdomen pelvis 5/16/2018. PET 8/15/2019.    FINDINGS:    VASCULATURE: No evidence of aortic aneurysm, dissection or intramural hematoma. Patent celiac, SMA and ROLANDO branches. Diffuse episodic disease of aorta and its branches. Occluded left renal artery.    LUNGS, PLEURA, AIRWAYS: Patent proximal tracheobronchial tree. Small right pleural effusion. No pneumothorax or focal consolidation. Right subsegmental atelectasis    THORACIC NODES: No mediastinal, hilar, supraclavicular, or axillary lymphadenopathy.    MEDIASTINUM/GREAT VESSELS: No pericardial effusion. Heart size is within normal limits. The aorta and main pulmonary artery are of normal caliber.    HEPATOBILIARY: Cholelithiasis.    SPLEEN: Unremarkable.    PANCREAS: Unremarkable.    ADRENAL GLANDS: Unremarkable.    KIDNEYS: Atrophic left kidney likely secondary to a chronically occluded left renal artery. No hydronephrosis.    ABDOMINAL NODES: No lymphadenopathy.    VISUALIZED PERITONEUM /MESENTERY/BOWEL/PELVIS: Circumferential bowel wall thickening of the distal sigmoid and rectum. No evidence of bowel obstruction or free air. Surgical sutures are noted in the right lower quadrant.    BONES/SOFT TISSUES:Multilevel degenerative changes of the spine. No acute osseous abnormalities      IMPRESSION:    No CTA evidence of aortic aneurysm, dissection or intramural hematoma.    Circumferential wall thickening of the distal sigmoid and rectum. Differential includes neoplastic process.    Small right pleural effusion.    Additional Findings/Recommendations After Attending Radiologist Review:  Agree there is sigmoid colonic and rectal wall thickening. Differential diagnosis includes colitis, neoplasm unlikely.            SELENA PARRA M.D., RESIDENT RADIOLOGIST  This document has been electronically signed.  FERMIN CASANOVA MD; Attending Radiologist  This document has been electronically signed. Nov 28 2020  4:57AM (11-28-20 @ 04:31)                     SUBJECTIVE:   LENGTH OF HOSPITAL STAY: 2d    CHIEF COMPLAINT:  Patient is a 80y old  Female who presents with a chief complaint of Shortness of breath (29 Nov 2020 15:15)      Events over the past 24 hours:  Patient was seen and examined at the bedside this morning. There were no acute events overnight. She is lying comfortably on the bed.     REVIEW OF SYSTEMS  Negative Except as mentioned above.    ALLERGIES:  penicillin (Anaphylaxis)  penicillin (Unknown)        MEDICATIONS:  STANDING MEDICATIONS  aspirin  chewable 81 milliGRAM(s) Oral daily  chlorhexidine 4% Liquid 1 Application(s) Topical <User Schedule>  levothyroxine 25 MICROGram(s) Oral daily  megestrol 40 milliGRAM(s) Oral daily  metoprolol succinate  milliGRAM(s) Oral daily  mirtazapine 30 milliGRAM(s) Oral at bedtime  multivitamin 1 Tablet(s) Oral daily  pantoprazole    Tablet 40 milliGRAM(s) Oral before breakfast  rivaroxaban 20 milliGRAM(s) Oral daily  vancomycin  IVPB 1000 milliGRAM(s) IV Intermittent every 24 hours  vancomycin  IVPB        PRN MEDICATIONS        OBJECTIVE:  VITALS:   T(F): 98.2 (11-30-20 @ 05:18), Max: 98.2 (11-30-20 @ 05:18)  HR: 95 (11-30-20 @ 05:18) (93 - 123)  BP: 103/56 (11-30-20 @ 05:18) (103/56 - 202/95)  BP(mean): --  RR: 18 (11-30-20 @ 05:18) (18 - 18)  SpO2: 96% (11-30-20 @ 02:06) (89% - 100%)    I&O's:   I&O's Summary    29 Nov 2020 07:01  -  30 Nov 2020 07:00  --------------------------------------------------------  IN: 0 mL / OUT: 1300 mL / NET: -1300 mL        PHYSICAL EXAM:  General: Not in distress; Pallor (-), Icterus (-), Cyanosis (-), Clubbing (-)  HEENT: Pupils equal, round and reactive to light symmetrically, EOM - Normal bilaterally; Hearing - b/l normal; No external discharge noted, JVD (-), Lymphadenopathy(-)  PULM: Bilaterally equal and clear breath sounds, no wheeze, rubs or crackles.   CVS: Normal S1 and S2, no murmurs, rubs, or gallops.   GI: Soft, nondistended, nontender, BS +  MSK: Edema (+), no joint or muscle tenderness  SKIN: Warm and well perfused, no rashes noted  NEURO:  Alert and Oriented x 3; Cranial Nerves are all grossly intact; Normal strength and sensation in all four extremities.    LABS:                        13.3   10.05 )-----------( 193      ( 29 Nov 2020 05:03 )             41.8             11-29    145  |  102  |  41<H>  ----------------------------<  77  4.1   |  27  |  1.4    Ca    9.8      29 Nov 2020 05:03  Mg     2.2     11-29    TPro  6.2  /  Alb  3.4<L>  /  TBili  0.5  /  DBili  x   /  AST  32  /  ALT  22  /  AlkPhos  93  11-29    LIVER FUNCTIONS - ( 29 Nov 2020 05:03 )  Alb: 3.4 g/dL / Pro: 6.2 g/dL / ALK PHOS: 93 U/L / ALT: 22 U/L / AST: 32 U/L / GGT: x                   CARDIAC MARKERS ( 28 Nov 2020 11:42 )  x     / 0.04 ng/mL / x     / x     / x                Culture - Blood (collected 28 Nov 2020 23:54)  Source: .Blood None  Preliminary Report (30 Nov 2020 07:01):    No growth to date.    Culture - Blood (collected 27 Nov 2020 20:53)  Source: .Blood Blood  Gram Stain (28 Nov 2020 17:58):    Growth in aerobic and anaerobic bottles: Gram Positive Cocci in Clusters  Preliminary Report (29 Nov 2020 17:05):    Growth in aerobic and anaerobic bottles: Staphylococcus capitis    "Due to technical problems, Proteus sp. will Not be reported as part of    the BCID panel until further notice"    ***Blood Panel PCR results on this specimen are available    approximately3 hours after the Gram stain result.***    Gram stain, PCR, and/or culture results may not always    correspond due to difference in methodologies.    ************************************************************    This PCR assay was performed using [employer].    The following targets are tested for: Enterococcus,    vancomycin resistant enterococci, Listeria monocytogenes,    coagulase negative staphylococci, S. aureus,    methicillin resistant S. aureus, Streptococcus agalactiae    (Group B), S. pneumoniae, S. pyogenes (Group A),    Acinetobacter baumannii, Enterobacter cloacae, E. coli,    Klebsiella oxytoca, K. pneumoniae, Proteus sp.,    Serratia marcescens, Haemophilus influenzae,    Neisseria meningitidis, Pseudomonas aeruginosa, Candida    albicans, C. glabrata, C krusei, C parapsilosis,    C. tropicalis and the KPC resistance gene.  Organism: Blood Culture PCR (28 Nov 2020 19:13)  Organism: Blood Culture PCR (28 Nov 2020 19:13)    Culture - Blood (collected 27 Nov 2020 20:53)  Source: .Blood Blood  Gram Stain (28 Nov 2020 20:50):    Growth in anaerobic bottle: Gram Positive Cocci in Clusters    Growth in aerobic bottle: Gram Positive Cocci in Clusters  Preliminary Report (29 Nov 2020 18:03):    Growth in anaerobic bottle: Staphylococcus capitis  Organism: Staphylococcus capitis  Staphylococcus capitis (29 Nov 2020 18:02)  Organism: Staphylococcus capitis (29 Nov 2020 18:02)  Organism: Staphylococcus capitis (29 Nov 2020 18:02)      CAPILLARY BLOOD GLUCOSE            RADIOLOGY & ADDITIONAL TESTS:    CT Angio Chest w/ IV Cont:   EXAM:  CT ANGIO ABD PELVIS (W)AW IC        EXAM:  CT ANGIO CHEST (W)AW IC            PROCEDURE DATE:  11/28/2020            INTERPRETATION:  CLINICAL HISTORY/REASON FOR EXAM: Dissection. History of small bowel lymphoma.    TECHNIQUE: Contiguous axial CT images of the chest without IV contrast. Post-administration of Omnipaque 350, CT angiographic axial images of the aorta from the chest to the pelvis were obtained. Coronal and sagittal reformats were performed. 3-D reconstruction / MIPs was obtained.    COMPARISON: CT chest abdomen pelvis 5/16/2018. PET 8/15/2019.    FINDINGS:    VASCULATURE: No evidence of aortic aneurysm, dissection or intramural hematoma. Patent celiac, SMA and ROLANDO branches. Diffuse episodic disease of aorta and its branches. Occluded left renal artery.    LUNGS, PLEURA, AIRWAYS: Patent proximal tracheobronchial tree. Small right pleural effusion. No pneumothorax or focal consolidation. Right subsegmental atelectasis    THORACIC NODES: No mediastinal, hilar, supraclavicular, or axillary lymphadenopathy.    MEDIASTINUM/GREAT VESSELS: No pericardial effusion. Heart size is within normal limits. The aorta and main pulmonary artery are of normal caliber.    HEPATOBILIARY: Cholelithiasis.    SPLEEN: Unremarkable.    PANCREAS: Unremarkable.    ADRENAL GLANDS: Unremarkable.    KIDNEYS: Atrophic left kidney likely secondary to a chronically occluded left renal artery. No hydronephrosis.    ABDOMINAL NODES: No lymphadenopathy.    VISUALIZED PERITONEUM /MESENTERY/BOWEL/PELVIS: Circumferential bowel wall thickening of the distal sigmoid and rectum. No evidence of bowel obstruction or free air. Surgical sutures are noted in the right lower quadrant.    BONES/SOFT TISSUES:Multilevel degenerative changes of the spine. No acute osseous abnormalities      IMPRESSION:    No CTA evidence of aortic aneurysm, dissection or intramural hematoma.    Circumferential wall thickening of the distal sigmoid and rectum. Differential includes neoplastic process.    Small right pleural effusion.    Additional Findings/Recommendations After Attending Radiologist Review:  Agree there is sigmoid colonic and rectal wall thickening. Differential diagnosis includes colitis, neoplasm unlikely.            SELENA PARRA M.D., RESIDENT RADIOLOGIST  This document has been electronically signed.  FERMIN CASANOVA MD; Attending Radiologist  This document has been electronically signed. Nov 28 2020  4:57AM (11-28-20 @ 04:31)

## 2020-11-30 NOTE — PROGRESS NOTE ADULT - SUBJECTIVE AND OBJECTIVE BOX
no chest pain   no sob   HR better controlled     Vital Signs Last 24 Hrs  T(C): 36.8 (30 Nov 2020 05:18), Max: 36.8 (30 Nov 2020 05:18)  T(F): 98.2 (30 Nov 2020 05:18), Max: 98.2 (30 Nov 2020 05:18)  HR: 95 (30 Nov 2020 05:18) (93 - 123)  BP: 103/56 (30 Nov 2020 05:18) (103/56 - 202/95)  BP(mean): --  RR: 18 (30 Nov 2020 05:18) (18 - 18)  SpO2: 96% (30 Nov 2020 02:06) (89% - 100%)    PHYSICAL EXAM:  GENERAL: NAD,  Pulm: Clear to auscultation bilaterally; No wheeze  CV: Regular rate and rhythm; No murmurs, rubs, or gallops  GI: Soft, Nontender, Nondistended; Bowel sounds present  EXTREMITIES:  + edema  PSYCH: AAOx3  NEUROLOGY: non-focal  SKIN: No rashes                           13.3   10.05 )-----------( 193      ( 29 Nov 2020 05:03 )             41.8     11-29    145  |  102  |  41<H>  ----------------------------<  77  4.1   |  27  |  1.4    Ca    9.8      29 Nov 2020 05:03  Mg     2.2     11-29    TPro  6.2  /  Alb  3.4<L>  /  TBili  0.5  /  DBili  x   /  AST  32  /  ALT  22  /  AlkPhos  93  11-29    LIVER FUNCTIONS - ( 29 Nov 2020 05:03 )  Alb: 3.4 g/dL / Pro: 6.2 g/dL / ALK PHOS: 93 U/L / ALT: 22 U/L / AST: 32 U/L / GGT: x               MEDICATIONS  (STANDING):  aspirin  chewable 81 milliGRAM(s) Oral daily  chlorhexidine 4% Liquid 1 Application(s) Topical <User Schedule>  levothyroxine 25 MICROGram(s) Oral daily  megestrol 40 milliGRAM(s) Oral daily  metoprolol succinate  milliGRAM(s) Oral daily  mirtazapine 30 milliGRAM(s) Oral at bedtime  multivitamin 1 Tablet(s) Oral daily  pantoprazole    Tablet 40 milliGRAM(s) Oral before breakfast  rivaroxaban 20 milliGRAM(s) Oral daily    MEDICATIONS  (PRN):      Culture - Blood (collected 29 Nov 2020 05:03)  Source: .Blood None  Preliminary Report (30 Nov 2020 13:02):    No growth to date.    Culture - Blood (collected 28 Nov 2020 23:54)  Source: .Blood None  Preliminary Report (30 Nov 2020 07:01):    No growth to date.    Culture - Blood (collected 27 Nov 2020 20:53)  Source: .Blood Blood  Gram Stain (28 Nov 2020 17:58):    Growth in aerobic and anaerobic bottles: Gram Positive Cocci in Clusters  Preliminary Report (29 Nov 2020 17:05):    Growth in aerobic and anaerobic bottles: Staphylococcus capitis    "Due to technical problems, Proteus sp. will Not be reported as part of    the BCID panel until further notice"    ***Blood Panel PCR results on this specimen are available    approximately3 hours after the Gram stain result.***    Gram stain, PCR, and/or culture results may not always    correspond due to difference in methodologies.    ************************************************************    This PCR assay was performed using [employer].    The following targets are tested for: Enterococcus,    vancomycin resistant enterococci, Listeria monocytogenes,    coagulase negative staphylococci, S. aureus,    methicillin resistant S. aureus, Streptococcus agalactiae    (Group B), S. pneumoniae, S. pyogenes (Group A),    Acinetobacter baumannii, Enterobacter cloacae, E. coli,    Klebsiella oxytoca, K. pneumoniae, Proteus sp.,    Serratia marcescens, Haemophilus influenzae,    Neisseria meningitidis, Pseudomonas aeruginosa, Candida    albicans, C. glabrata, C krusei, C parapsilosis,    C. tropicalis and the KPC resistance gene.  Organism: Blood Culture PCR (28 Nov 2020 19:13)  Organism: Blood Culture PCR (28 Nov 2020 19:13)    Culture - Blood (collected 27 Nov 2020 20:53)  Source: .Blood Blood  Gram Stain (28 Nov 2020 20:50):    Growth in anaerobic bottle: Gram Positive Cocci in Clusters    Growth in aerobic bottle: Gram Positive Cocci in Clusters  Preliminary Report (29 Nov 2020 18:03):    Growth in anaerobic bottle: Staphylococcus capitis  Organism: Staphylococcus capitis  Staphylococcus capitis (29 Nov 2020 18:02)  Organism: Staphylococcus capitis (29 Nov 2020 18:02)  Organism: Staphylococcus capitis (29 Nov 2020 18:02)

## 2020-11-30 NOTE — PROGRESS NOTE ADULT - ASSESSMENT
Atrial Fibrillation with RVR:   HR controlled now   continue Metoprolol and Xarelto.     VANI on KD stage 3: improved   Cr 1.4 from 1.8, s/p IV fluid in the ED.   recheck BMP and if cr stable resume po lasix 40 qday       sigmoid colon and rectal thickening on CT:   patient says she never had colonoscopy and torres snot want any further work up for this thickening and she understands that it could be cancer and said even if tis cancer she does not any surgical intervention or chemo or radiation and asked me to leave it alone       Chronic HFmrEF:   EF 34-40 on toprol   no further intervention per cardiology OPT follow up. discussed with DR Rivera today, EF lower than previous likely tachycardia induced       lactic acidosis likely from hypoperfusion improved down to 3.2       CAD s/p stents   Continue ASA 81mg daily Toprol 100mg PO daily.  Troponin mildly elevated likely from tachycardia.      PT/Rehab and discharge planning will likely need snf

## 2020-11-30 NOTE — PROGRESS NOTE ADULT - ASSESSMENT
Ms. Lira is an 80F with a PMH of HF with reduced EF, CAD s/p stents, pHTN, HTN, MALToma, afib, and Type 2 DM presented to the ED for evaluation of shortness of breath found to be in rapid afib.     # Atrial fibrillation with RVR 2/2 hypoperfusion in the setting of overdiuresis   - I believe this occurred given the patients recent decreased PO intake and concomitant use of Lasix -> overdiuresis and volume depletion -> hypoperfusion/shock-like state -> adrenergic activation and HAGMA from lactic acidosis -> atrial fib with RVR -> further hypoperfusion -> symptoms of AMS and SOB that lead to presentation. Patient improved after fluid resuscitation with no use of medication.   - EF in 4/2020 showed EF = 45-50% with mod pHTN, follow-up repeat echo  - Continue Xarelto 20mg PO daily for anticoagulation  - HR better controlled now, continue Toprol 100mg daily for rate control  - Daily EKG  - Cardiology consult, saw Dr. LISBET Braxton last admission  - Consider HF consult, BNP >12,000  - Lasix 40mg PO daily at home, continue Hold Lasix for now. Monitor renal function. Given Hemoconcentration and Na of 149 with hisotory of decreased PO intake, pt is volume depleted intravascularly, follow-up repeat lytes and give gentle hydration LR at 50mL/hr     # HAGMA 2/2 Lactic Acidosis   - LA 6.0 on admission, given fluids and repeat was 3.2 Follow-up at 11am.     # CAD s/p stents   #HTN  - Continue ASA 81mg daily  - Continue Toprol 100mg PO daily   - Follow-up repeat trop     # Hypothyroidism   - Synthroid 25 mcg daily   - Follow-up TSH    # CKD  - Cr bumped up from baseline (1.4 -> 1.8) Continue to monitor. Avoid nephrotoxins. Hold lasix for now and gently hydrate.      # MALToma s/p chemoradiation in remission  # PUD  - Continue Protonix 40mg PO daily   - Outpatient follow-up    DVT ppx: Xarelto 20mg PO daily    GI ppx: Protonix 40mg daily   Diet: DASH  Activity: IAT   Dispo: From home   Code: FULL Ms. Lira is an 80F with a PMH of HF with reduced EF, CAD s/p stents, pHTN, HTN, MALToma, afib, and Type 2 DM presented to the ED for evaluation of shortness of breath found to be in rapid afib.     # Atrial fibrillation with RVR 2/2 hypoperfusion in the setting of overdiuresis   - I believe this occurred given the patients recent decreased PO intake and concomitant use of Lasix -> overdiuresis and volume depletion -> hypoperfusion/shock-like state -> adrenergic activation and HAGMA from lactic acidosis -> atrial fib with RVR -> further hypoperfusion -> symptoms of AMS and SOB that lead to presentation. Patient improved after fluid resuscitation with no use of medication.   - EF in 4/2020 showed EF = 45-50% with mod pHTN, follow-up repeat echo  - Repeat echo (11/29) : Left ventricular ejection fraction, by visual estimation, is 35 to 40%.  - Continue Xarelto 20mg PO daily for anticoagulation  - HR better controlled now, continue Toprol 100mg daily for rate control    # h/o CAD s/p stents   # Elevated troponin   - trending down   - unlikely ACS  - Continue ASA, Beta-blockers, Anticoagulation    # Hypothyroidism   - Synthroid 25 mcg daily   - Follow-up TSH    # VANI/ CKD  - Hold lasix for now, gentle hydration  - can restart laxsix at home dose in 24 hrs per cardio  - Follow up lytes and cr    # MALToma s/p chemoradiation in remission  # PUD  - Continue Protonix 40mg PO daily   - Outpatient follow-up    # HTN  - Continue Metoprolol succinate 100mg OD    DVT ppx: Xarelto 20mg PO daily    GI ppx: Protonix 40mg daily   Diet: DASH  Activity: IAT   Dispo: SNF   Code: FULL Ms. Lira is an 80F with a PMH of HF with reduced EF, CAD s/p stents, pHTN, HTN, MALToma, afib, and Type 2 DM presented to the ED for evaluation of shortness of breath found to be in rapid afib.     # Atrial fibrillation with RVR  - HR controlled now  - EF in 4/2020 showed EF = 45-50% with mod pHTN, follow-up repeat echo  - Repeat echo (11/29) : Left ventricular ejection fraction, by visual estimation, is 35 to 40%.  - Continue Xarelto 20mg PO daily for anticoagulation  - HR better controlled now, continue Toprol 100mg daily for rate control    # h/o CAD s/p stents   # Elevated troponin  - trending down   - unlikely ACS  - Continue ASA, Beta-blockers, Anticoagulation    # Hypothyroidism   - Synthroid 25 mcg daily   - Follow-up TSH    # VANI/ CKD  - Hold lasix for now, gentle hydration  - can restart laxsix at home dose in 24 hrs per cardio  - Follow up lytes and cr    # MALToma s/p chemoradiation in remission  # PUD  - Continue Protonix 40mg PO daily   - Outpatient follow-up    # HTN  - Continue Metoprolol succinate 100mg OD    DVT ppx: Xarelto 20mg PO daily    GI ppx: Protonix 40mg daily   Diet: DASH  Activity: IAT   Dispo: SNF   Code: FULL Ms. Lira is an 80F with a PMH of HF with reduced EF, CAD s/p stents, pHTN, HTN, MALToma, afib, and Type 2 DM presented to the ED for evaluation of shortness of breath found to be in rapid afib.     # Atrial fibrillation with RVR  - HR controlled now  - EF in 4/2020 showed EF = 45-50% with mod pHTN, follow-up repeat echo  - Repeat echo (11/29) : Left ventricular ejection fraction, by visual estimation, is 35 to 40%.  - Continue Xarelto 20mg PO daily for anticoagulation  - HR better controlled now, continue Toprol 100mg daily for rate control    # h/o CAD s/p stents   # Elevated troponin  - trending down   - unlikely ACS  - Continue ASA, Beta-blockers, Anticoagulation    # Hypothyroidism   - Synthroid 25 mcg daily   - Follow-up TSH    # VANI/ CKD  - Hold lasix for now, gentle hydration  - Follow up creatinine - if stable can start Lasix 40mg PO OD    # MALToma s/p chemoradiation in remission  # PUD  - Continue Protonix 40mg PO daily   - Outpatient follow-up    # HTN  - Continue Metoprolol succinate 100mg OD    # DVT ppx: pt on Xarelto 20mg PO daily    # GI ppx: Protonix 40mg daily   # Diet: DASH  # Activity: IAT    # Dispo: SNF   # Code: FULL Ms. Lira is an 80F with a PMH of HF with reduced EF, CAD s/p stents, pHTN, HTN, MALToma, afib, and Type 2 DM presented to the ED for evaluation of shortness of breath found to be in rapid afib.     # Atrial fibrillation with RVR  - HR controlled now  - EF in 4/2020 showed EF = 45-50% with mod pHTN, follow-up repeat echo  - Repeat echo (11/29) : Left ventricular ejection fraction, by visual estimation, is 35 to 40%.  - Outpatient cardiology follow up  - Continue Xarelto 20mg PO daily for anticoagulation  - HR better controlled now, continue Toprol 100mg daily for rate control    # h/o CAD s/p stents   # Elevated troponin  - trending down   - unlikely ACS  - Continue ASA, Beta-blockers, Anticoagulation    # Hypothyroidism   - Synthroid 25 mcg daily   - Follow-up TSH    # VANI/ CKD  - Hold lasix for now, gentle hydration  - Follow up creatinine - if stable can start Lasix 40mg PO OD    # MALToma s/p chemoradiation in remission  # PUD  # sigmoid colon and rectal thickening on CT:   - patient says she never had colonoscopy and torres not want any further work up for this thickening  - Continue Protonix 40mg PO daily   - Outpatient follow-up    # HTN  - Continue Metoprolol succinate 100mg OD    # DVT ppx: pt on Xarelto 20mg PO daily    # GI ppx: Protonix 40mg daily   # Diet: DASH  # Activity: IAT    # Dispo: SNF   # Code: FULL 80F with a PMH of HF with reduced EF, CAD s/p stents, pHTN, HTN, MALToma, afib, and Type 2 DM presented to the ED for evaluation of shortness of breath found to be in Afib with RVR    # Atrial fibrillation with RVR  - HR controlled now  - EF in 4/2020 showed EF = 45-50% with mod pHTN, follow-up repeat echo  - Repeat echo (11/29) : Left ventricular ejection fraction, by visual estimation, is 35 to 40%.  - Outpatient cardiology follow up  - Continue Xarelto 20mg PO daily for anticoagulation  - HR better controlled now, continue Toprol 100mg daily for rate control    # h/o CAD s/p stents   # Elevated troponin  - trending down   - unlikely ACS  - Continue ASA, Beta-blockers, Anticoagulation    # Hypothyroidism   - Synthroid 25 mcg daily   - Follow-up TSH    # VANI/ CKD  - Hold lasix for now, gentle hydration  - Follow up creatinine - if stable can start Lasix 40mg PO OD    # MALToma s/p chemoradiation in remission  # PUD  # sigmoid colon and rectal thickening on CT:   - patient says she never had colonoscopy and torres not want any further work up for this thickening  - Continue Protonix 40mg PO daily   - Outpatient follow-up    # HTN  - Continue Metoprolol succinate 100mg OD    # DVT ppx: pt on Xarelto 20mg PO daily    # GI ppx: Protonix 40mg daily   # Diet: DASH  # Activity: IAT    # Dispo: SNF   # Code: FULL 80F with a PMH of HF with reduced EF, CAD s/p stents, pHTN, HTN, MALToma, afib, and Type 2 DM presented to the ED for evaluation of shortness of breath found to be in Afib with RVR    # Atrial fibrillation with RVR  - HR controlled now  - EF in 4/2020 showed EF = 45-50% with mod pHTN, follow-up repeat echo  - Repeat echo (11/29) : Left ventricular ejection fraction, by visual estimation, is 35 to 40%.  - Outpatient cardiology follow up  - Continue Xarelto 20mg PO daily for anticoagulation  - HR better controlled now, continue Toprol 100mg daily for rate control    # h/o CAD s/p stents   # Elevated troponin  - trending down   - unlikely ACS  - Continue ASA, Beta-blockers, Anticoagulation    # Hypothyroidism   - Synthroid 25 mcg daily   - Follow-up TSH    # VANI/ CKD  - Hold lasix for now, gentle hydration  - Follow up creatinine - if stable can start Lasix 40mg PO OD    # MALToma s/p chemoradiation in remission  # PUD  # sigmoid colon and rectal thickening on CT:   - patient says she never had colonoscopy and torres not want any further work up for this thickening  - Continue Protonix 40mg PO daily   - Outpatient follow-up    # HTN  - Continue Metoprolol succinate 100mg OD    # DVT ppx: pt on Xarelto 20mg PO daily    # GI ppx: Protonix 40mg daily   # Diet: DASH  # Activity: IAT    # Dispo: SNF - COVID swab ordered - Follow up report  # Code: FULL

## 2020-11-30 NOTE — PROGRESS NOTE ADULT - ASSESSMENT
80 y.o female patient with PMH HFrEF, EF mildly decreased on repeat echo - likely tachycardia induced. Right Heart Failure, CAD s/p remote PCI with stents, HTN, pHTN, MALT lymphoma of stomach s/p chemoradiation in remission, atrial fibrillation on Xarelto, PUD, CKD 3-4, presents with shortness of breath and atrial fibrillation with RVR    # Atrial fibrillation  - HR  controlled  - Continue beta-blockers and anticoagulation with Xarelto      # Elevated troponin  - Trending down  - ACS not likely at this time  - Continue aspirin, beta-blockers, AC    # VANI / history of CHF  - Patient looks euvolemic/hypovolemic on physical exam  - Please repeat labs and chest xray today  - Hold Lasix for now, gentle hydration  - Probably can restart Lasix at home dose  - Monitor renal function, electrolytes, daily weight and volume status  - If stable, d/c planning

## 2020-12-01 ENCOUNTER — TRANSCRIPTION ENCOUNTER (OUTPATIENT)
Age: 80
End: 2020-12-01

## 2020-12-01 LAB — SARS-COV-2 RNA SPEC QL NAA+PROBE: SIGNIFICANT CHANGE UP

## 2020-12-01 PROCEDURE — 99233 SBSQ HOSP IP/OBS HIGH 50: CPT

## 2020-12-01 PROCEDURE — 71045 X-RAY EXAM CHEST 1 VIEW: CPT | Mod: 26

## 2020-12-01 RX ADMIN — Medication 40 MILLIGRAM(S): at 05:43

## 2020-12-01 RX ADMIN — Medication 100 MILLIGRAM(S): at 05:43

## 2020-12-01 RX ADMIN — PANTOPRAZOLE SODIUM 40 MILLIGRAM(S): 20 TABLET, DELAYED RELEASE ORAL at 05:43

## 2020-12-01 RX ADMIN — MIRTAZAPINE 30 MILLIGRAM(S): 45 TABLET, ORALLY DISINTEGRATING ORAL at 21:26

## 2020-12-01 RX ADMIN — MEGESTROL ACETATE 40 MILLIGRAM(S): 40 SUSPENSION ORAL at 12:00

## 2020-12-01 RX ADMIN — Medication 81 MILLIGRAM(S): at 12:00

## 2020-12-01 RX ADMIN — RIVAROXABAN 20 MILLIGRAM(S): KIT at 17:23

## 2020-12-01 RX ADMIN — Medication 25 MICROGRAM(S): at 05:43

## 2020-12-01 RX ADMIN — Medication 1 TABLET(S): at 12:00

## 2020-12-01 NOTE — DISCHARGE NOTE PROVIDER - HOSPITAL COURSE
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 Patient was hypoxic to the 80s, tachycardic to 150, and hypertensive at 175/100. An EKG revealed Afib with RVR. a CT Angio was negative for PE. Chest X-ray showed a small right pleural effusion. Labs were remarkable for a sodium of 149, BNP > 12,000, Lactate of 6 and Cr of 1.8. Patient was admitted for congestive heart failure exacerbation.     Hospital Course:  Patient's heart rate was controlled with Metoprolol, and she was continued on Xarelto for anticoagulation. She also received ASA. Her most recent EKG showed NSR. An ECHO from 4/2020 showed an ejection fraction of 45-50% with moderate pHTN. A repeat ECHO was performed during this hospital stay and showed left ventricular ejection fraction of 35- 40%. Patient was noted to have elevated troponins, which trended downwards. There were no signs of acute coronary syndrome Patient had VANI on admission, which resolved. Lasix held, gentle hydration given. Patient was restarted on her home dose Lasix. Of note, patient's CT scan showed sigmoid colon and rectal thickening, but she did not want any further workup. Patient is vitally stable and has symptomatically improved. She can follow up with her cardiologist in 1 week.    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 Patient was hypoxic to the 80s, tachycardic to 150, and hypertensive at 175/100. An EKG revealed Afib with RVR. a CT Angio was negative for PE. Chest X-ray showed a small right pleural effusion. Labs were remarkable for a sodium of 149, BNP > 12,000, Lactate of 6 and Cr of 1.8. Patient was admitted for congestive heart failure exacerbation.     Hospital Course:  Patient's heart rate was controlled with Metoprolol, and she was continued on Xarelto for anticoagulation. She also received ASA. Her most recent EKG showed NSR. An ECHO from 4/2020 showed an ejection fraction of 45-50% with moderate pHTN. A repeat ECHO was performed during this hospital stay and showed left ventricular ejection fraction of 35- 40%. Patient was noted to have elevated troponins, which trended downwards. There were no signs of acute coronary syndrome Patient had VANI on admission, which resolved. Lasix held, gentle hydration given. Patient was restarted on her home dose Lasix. Of note, patient's CT scan showed sigmoid colon and rectal thickening, but she did not want any further workup. Patient is vitally stable and has symptomatically improved. Of note, while she is stable and her cxr is improved, at home she was on RA satting 85% and here she on 2L NC satting Vianney can follow up with her cardiologist in 1 week.    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 Patient was hypoxic to the 80s, tachycardic to 150, and hypertensive at 175/100. An EKG revealed Afib with RVR. a CT Angio was negative for PE. Chest X-ray showed a small right pleural effusion. Labs were remarkable for a sodium of 149, BNP > 12,000, Lactate of 6 and Cr of 1.8. Patient was admitted for congestive heart failure exacerbation.     Hospital Course:  Patient's heart rate was controlled with Metoprolol, and she was continued on Xarelto for anticoagulation. She also received ASA. Her most recent EKG showed NSR. An ECHO from 4/2020 showed an ejection fraction of 45-50% with moderate pHTN. A repeat ECHO was performed during this hospital stay and showed left ventricular ejection fraction of 35- 40%. Patient was noted to have elevated troponins, which trended downwards. There were no signs of acute coronary syndrome Patient had VANI on admission, which resolved. Lasix held, gentle hydration given. Patient was restarted on her home dose Lasix. Of note, patient's CT scan showed sigmoid colon and rectal thickening, but she did not want any further workup. Patient is vitally stable and has symptomatically improved. Of note, while she is stable and her cxr is improved, at home she was on RA satting 85% and here she on 2L NC satting 96. She is being d.cd to [facility] with follow up with her cardiologist in 1 week.

## 2020-12-01 NOTE — DISCHARGE NOTE PROVIDER - NSDCCPCAREPLAN_GEN_ALL_CORE_FT
PRINCIPAL DISCHARGE DIAGNOSIS  Diagnosis: CHF exacerbation  Assessment and Plan of Treatment: Congestive Heart Failure (CHF)  Congestive heart failure is a chronic condition in which the heart has trouble pumping blood. In some cases of heart failure, fluid may back up into your lungs or you may have swelling (edema) in your lower legs. There are many causes of heart failure including high blood pressure, coronary artery disease, abnormal heart valves, heart muscle disease, lung disease, diabetes, etc. Symptoms include shortness of breath with activity or when lying flat, cough, swelling of the legs, fatigue, or increased urination during the night.   Treatment is aimed at managing the symptoms of heart failure and may include lifestyle changes, medications, or surgical procedures. Take medicines only as directed by your health care provider and do not stop unless instructed to do so. Eat heart-healthy foods with low or no trans/saturated fats, cholesterol and salt. Weigh yourself every day for early recognition of fluid accumulation.  SEEK IMMEDIATE MEDICAL CARE IF YOU HAVE ANY OF THE FOLLOWING SYMPTOMS: shortness of breath, change in mental status, chest pain, lightheadedness/dizziness/fainting, or worsening of symptoms including not being able to conduct normal physical activity.        SECONDARY DISCHARGE DIAGNOSES  Diagnosis: Atrial fibrillation with RVR  Assessment and Plan of Treatment: Atrial Fibrillation  Atrial fibrillation is a type of irregular heartbeat (arrhythmia) where the heart quivers continuously in a chaotic pattern that makes the heart unable to pump blood normally. This can increase the risk for stroke, heart failure, and other heart-related conditions. Atrial fibrillation can be caused by a variety of conditions and may be temporary, intermittent, or permanent. Symptoms include feeling that your heart is beating rapidly or irregularly, chest discomfort, shortness of breath, or dizziness/lightheadedness that may be worse with exertion. Treatment is varied but may involve medication or electrical shock (cardioversion).  SEEK IMMEDIATE MEDICAL CARE IF YOU HAVE ANY OF THE FOLLOWING SYMPTOMS: chest pain, shortness of breath, abdominal pain, sweating, vomiting, blood in vomit/bowel movements/urine, dizziness/lightheadedness, weakness or numbness to face/arm/leg, trouble speaking or understanding, facial droop.

## 2020-12-01 NOTE — PROGRESS NOTE ADULT - ASSESSMENT
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 (01 Dec 2020 10:50)    Currently admitted to medicine with the primary diagnosis of CHF exacerbation.       ASSESSMENT & PLAN    Atrial fibrillation with RVR  - On admission,  EKG showed atrial fibrillation with RVR  - 11/28 EKG showed NSR  - Rate controlled now with Metoprolol 100 mg PO daily   - 4/2020 ECHO showed EF of 45-50% with moderate pHTN  - Repeat echo (11/29) showed Left ventricular ejection fraction, by visual estimation, 35 to 40%.  - Continue Xarelto 20mg PO daily for anticoagulation  - Continue Metoprolol 100 mg PO daily   - F/u outpatient cardiology     CAD s/p stents and elevated troponin   - trending downwards  - unlikely ACS  - Continue ASA, Beta-blockers, Anticoagulation    Hypothyroidism   - TSH of 3.49 (wnl)   - Continue Synthroid 25 mcg daily     VANI on CKD- resolved with holding Lasix and gentle hydration  - May restart Lasix 40 mg PO daily   - Monitor renal function, electrolytes, daily weights and volume status     HTN- well controlled  - Continue Metoprolol 100 mg PO daily and monitor blood pressure     MALToma s/p chemoradiation in remission and PUD  - 11/28 CT scan of the abdomen showed circumferential wall thickening of the distal sigmoid and rectum  - Patient says she never had colonoscopy and is declining any further work up for this thickening  - Continue Protonix 40mg PO daily   - F/u outpatient with gastroenterologist       Misc:   # DVT ppx: pt on Xarelto 20mg PO daily    # GI ppx: Protonix 40mg daily   # Diet: DASH  # Activity: IAT    # Dispo: SNF tomorrow  - COVID swab performed - Follow up report  # Code: FULL     ADMISSION SUMMARY  Patient is a 80y old Female with a significant medical history of HTN, pHTN, Atrial fibrillation on Xarelto, CAD s/p PCI with stents, HFrEF (45-50% 4/2020), CKD stage 3-4, and 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 (01 Dec 2020 10:50)    Currently admitted to medicine with the primary diagnosis of CHF exacerbation.       ASSESSMENT & PLAN    #Atrial fibrillation with RVR  - On admission,  EKG showed atrial fibrillation with RVR  - 11/28 EKG showed NSR  - Rate controlled now with Metoprolol 100 mg PO daily   - 4/2020 ECHO showed EF of 45-50% with moderate pHTN  - Repeat echo (11/29) showed Left ventricular ejection fraction, by visual estimation, 35 to 40%.  - Continue Xarelto 20mg PO daily for anticoagulation  - Continue Metoprolol 100 mg PO daily   - F/u outpatient cardiology     #CAD s/p stents and elevated troponin   - trending downwards  - unlikely ACS  - Continue ASA, Beta-blockers, Anticoagulation    #Hypothyroidism   - TSH of 3.49 (wnl)   - Continue Synthroid 25 mcg daily     #VANI on CKD- resolved   - Held Lasix and given gentle hydration  - May restart Lasix 40 mg PO daily   - Monitor renal function, electrolytes, daily weights and volume status     #HTN- well controlled  - Continue Metoprolol 100 mg PO daily and monitor blood pressure     #MALToma s/p chemoradiation in remission and PUD  - 11/28 CT scan of the abdomen showed circumferential wall thickening of the distal sigmoid and rectum  - Patient says she never had colonoscopy and is declining any further work up for this thickening  - Continue Protonix 40mg PO daily   - F/u outpatient with gastroenterologist     # DVT ppx: pt on Xarelto 20mg PO daily    # GI ppx: Protonix 40mg daily   # Diet: DASH  # Activity: IAT    # Dispo: SNF tomorrow  - COVID swab 12/01  # Code: FULL

## 2020-12-01 NOTE — PROGRESS NOTE ADULT - SUBJECTIVE AND OBJECTIVE BOX
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 (01 Dec 2020 10:50)  Currently admitted to medicine with the primary diagnosis of CHF exacerbation    Today is hospital day 3d, and this morning she has improvement in her breathing and is in no distress. She reports no significant events overnight.        OBJECTIVE  PAST MEDICAL & SURGICAL HISTORY  History of stomach cancer  25 years ago-- had stomach surgery    Hyperlipidemia    CAD S/P percutaneous coronary angioplasty    Atrial fibrillation    HTN (hypertension)    Diverticulitis    S/P small bowel resection    H/O abdominal hysterectomy      ALLERGIES:  penicillin (Anaphylaxis)  penicillin (Unknown)    MEDICATIONS:  STANDING MEDICATIONS  aspirin  chewable 81 milliGRAM(s) Oral daily  chlorhexidine 4% Liquid 1 Application(s) Topical <User Schedule>  furosemide    Tablet 40 milliGRAM(s) Oral daily  levothyroxine 25 MICROGram(s) Oral daily  megestrol 40 milliGRAM(s) Oral daily  metoprolol succinate  milliGRAM(s) Oral daily  mirtazapine 30 milliGRAM(s) Oral at bedtime  multivitamin 1 Tablet(s) Oral daily  pantoprazole    Tablet 40 milliGRAM(s) Oral before breakfast  rivaroxaban 20 milliGRAM(s) Oral daily    PRN MEDICATIONS      VITAL SIGNS: Last 24 Hours  T(C): 35.8 (01 Dec 2020 05:51), Max: 36.7 (01 Dec 2020 01:00)  T(F): 96.5 (01 Dec 2020 05:51), Max: 98 (01 Dec 2020 01:00)  HR: 92 (01 Dec 2020 05:51) (87 - 118)  BP: 146/78 (01 Dec 2020 05:00) (123/67 - 150/83)  BP(mean): --  RR: 18 (01 Dec 2020 05:51) (18 - 18)  SpO2: 100% (01 Dec 2020 07:30) (95% - 100%)    LABS:                        13.5   9.45  )-----------( 189      ( 30 Nov 2020 19:30 )             41.1     11-30    136  |  97<L>  |  28<H>  ----------------------------<  101<H>  3.8   |  26  |  1.4    Ca    8.6      30 Nov 2020 19:30  Mg     2.1     11-30    TPro  5.8<L>  /  Alb  3.2<L>  /  TBili  0.4  /  DBili  x   /  AST  27  /  ALT  18  /  AlkPhos  80  11-30      Culture - Blood (collected 29 Nov 2020 05:03)  Source: .Blood None  Preliminary Report (30 Nov 2020 13:02):    No growth to date.    Culture - Blood (collected 28 Nov 2020 23:54)  Source: .Blood None  Preliminary Report (30 Nov 2020 07:01):    No growth to date.    RADIOLOGY:    11/27 CT Angio negative for PE  11/27 CXR showed small right pleural effusion   11/28 CT of the Abdomen showed circumferential wall thickening of the distal sigmoid and rectum  11/28 ECHO showed left ventricular ejection fraction of 35-40%       PHYSICAL EXAM:    GENERAL: NAD, well-developed  HEENT:  Atraumatic, Normocephalic. No JVD. On NC   PULMONARY: Clear to auscultation bilaterally; No wheeze  CARDIOVASCULAR: Regular rate and rhythm; No murmurs, rubs, or gallops  GASTROINTESTINAL: Soft, Nontender, Nondistended  MUSCULOSKELETAL: No clubbing or cyanosis. 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 (01 Dec 2020 10:50)  Currently admitted to medicine with the primary diagnosis of CHF exacerbation    Today is hospital day 3d, and this morning she has improvement in her breathing and is in no distress. She reports no significant events overnight.        OBJECTIVE  PAST MEDICAL & SURGICAL HISTORY  History of stomach cancer  25 years ago-- had stomach surgery    Hyperlipidemia    CAD S/P percutaneous coronary angioplasty    Atrial fibrillation    HTN (hypertension)    Diverticulitis    S/P small bowel resection    H/O abdominal hysterectomy      ALLERGIES:  penicillin (Anaphylaxis)  penicillin (Unknown)    MEDICATIONS:  STANDING MEDICATIONS  aspirin  chewable 81 milliGRAM(s) Oral daily  chlorhexidine 4% Liquid 1 Application(s) Topical <User Schedule>  furosemide    Tablet 40 milliGRAM(s) Oral daily  levothyroxine 25 MICROGram(s) Oral daily  megestrol 40 milliGRAM(s) Oral daily  metoprolol succinate  milliGRAM(s) Oral daily  mirtazapine 30 milliGRAM(s) Oral at bedtime  multivitamin 1 Tablet(s) Oral daily  pantoprazole    Tablet 40 milliGRAM(s) Oral before breakfast  rivaroxaban 20 milliGRAM(s) Oral daily    PRN MEDICATIONS      VITAL SIGNS: Last 24 Hours  T(C): 35.8 (01 Dec 2020 05:51), Max: 36.7 (01 Dec 2020 01:00)  T(F): 96.5 (01 Dec 2020 05:51), Max: 98 (01 Dec 2020 01:00)  HR: 92 (01 Dec 2020 05:51) (87 - 118)  BP: 146/78 (01 Dec 2020 05:00) (123/67 - 150/83)  BP(mean): --  RR: 18 (01 Dec 2020 05:51) (18 - 18)  SpO2: 100% (01 Dec 2020 07:30) (95% - 100%)    LABS:                        13.5   9.45  )-----------( 189      ( 30 Nov 2020 19:30 )             41.1     11-30    136  |  97<L>  |  28<H>  ----------------------------<  101<H>  3.8   |  26  |  1.4    Ca    8.6      30 Nov 2020 19:30  Mg     2.1     11-30    TPro  5.8<L>  /  Alb  3.2<L>  /  TBili  0.4  /  DBili  x   /  AST  27  /  ALT  18  /  AlkPhos  80  11-30      Culture - Blood (collected 29 Nov 2020 05:03)  Source: .Blood None  Preliminary Report (30 Nov 2020 13:02):    No growth to date.    Culture - Blood (collected 28 Nov 2020 23:54)  Source: .Blood None  Preliminary Report (30 Nov 2020 07:01):    No growth to date.    RADIOLOGY:    11/27 CT Angio negative for PE  11/27 CXR showed small right pleural effusion   11/28 CT of the Abdomen showed circumferential wall thickening of the distal sigmoid and rectum  11/28 ECHO showed left ventricular ejection fraction of 35-40%       PHYSICAL EXAM:    GENERAL: NAD, on NC 2 L  HEENT:  Atraumatic, Normocephalic.    PULMONARY: Clear to auscultation bilaterally; No wheeze, decreased breath sounds, crackles heard on the rt side   CARDIOVASCULAR: Regular rate and rhythm; No murmurs, rubs, or gallops  GASTROINTESTINAL: Soft, Nontender, Nondistended  MUSCULOSKELETAL: No clubbing or cyanosis. Non-pitting edema of b/l LE   NEUROLOGY:  AAOx3, able to move all 4 extremities   SKIN: No rashes or lesions observed

## 2020-12-01 NOTE — DISCHARGE NOTE PROVIDER - NSDCMRMEDTOKEN_GEN_ALL_CORE_FT
aspirin 81 mg oral tablet, chewable: 1 tab(s) orally once a day  furosemide 40 mg oral tablet: 1 tab(s) orally once a day  levothyroxine 25 mcg (0.025 mg) oral tablet: 1 tab(s) orally once a day  megestrol 40 mg oral tablet: 1 tab(s) orally once a day  Metoprolol Succinate  mg oral tablet, extended release: 1 tab(s) orally once a day  mirtazapine 30 mg oral tablet: 1 tab(s) orally once a day (at bedtime)  Multiple Vitamins oral tablet: 1 tab(s) orally once a day  Protonix 40 mg oral delayed release tablet: 1 tab(s) orally once a day  Xarelto: 20 milligram(s) orally once a day

## 2020-12-01 NOTE — DISCHARGE NOTE PROVIDER - NSDCFUADDINST_GEN_ALL_CORE_FT
Please follow up with your regular doctor for further care and health maintenance.  Please take all your medications as prescribed.  Please follow up with your cardiologist, Dr. Rivera, for further medical management.

## 2020-12-01 NOTE — DISCHARGE NOTE PROVIDER - NSDCPNSUBOBJ_GEN_ALL_CORE
Sachi García MD  Hospitalist   Spectra: [phone]    Patient is a 80y old  Female who presents with a chief complaint of Shortness of breath (03 Dec 2020 10:50)      INTERVAL HPI/OVERNIGHT EVENTS: patient is off supplemental O2 this AM   sitting comfortably in bed without distress     REVIEW OF SYSTEMS: negative  Vital Signs Last 24 Hrs  T(C): 36.2 (03 Dec 2020 14:04), Max: 36.4 (03 Dec 2020 05:29)  T(F): 97.2 (03 Dec 2020 14:04), Max: 97.5 (03 Dec 2020 05:29)  HR: 90 (03 Dec 2020 14:11) (61 - 97)  BP: 124/85 (03 Dec 2020 14:11) (117/89 - 170/79)  RR: 18 (03 Dec 2020 14:04) (18 - 18)  SpO2: 97% (03 Dec 2020 10:52) (97% - 97%)    PHYSICAL EXAM:   NAD; Normocephalic;   LUNGS - no wheezing  HEART: S1 S2+   ABDOMEN: Soft, Nontender, non distended  EXTREMITIES: no cyanosis; no edema  NERVOUS SYSTEM:  Awake and alert; no focal neuro deficits appreciated

## 2020-12-01 NOTE — PROGRESS NOTE ADULT - ASSESSMENT
79 y/o woman with PMH of HTN, Atrial fibrillation on Xarelto, CAD s/p PCI, HFrEF (45-50%), Pulm HTN, CKD 3 and gastric MALT lymphoma of stomach s/p chemoradiation in remission was brought to ED for worsening SOB, weakness and lethargy.    1. Atrial Fibrillation with RVR  HR controlled now   continue Metoprolol and Xarelto     2. VANI on CKD stage 3: improved   Cr now stable at 1.4  lasix resumed    3. Chronic HFmrEF  lasix 40mg daily resumed today and pt is urinating  on metoprolol  ACE-I/ARB not given due to VANI - re-evaluate as outpt  no further intervention per cardiology   outpt f/u    4. sigmoid colon and rectal thickening on CT:   previous hospitalist discussed this finding with the pt and she does not want any further workup    5. Lactic acidosis improved    6. HTN - on metoprolol and lasix    7. CAD s/p stents   on ASA and metoprolol  Troponin mildly elevated likely from tachycardia        PROGRESS NOTE HANDOFF    Pending: COVID swab today    Disposition: STR at [facility]    Medication reconciliation reviewed with the resident    Spent 35 minutes on the discharge process of the pt   79 y/o woman with PMH of HTN, Atrial fibrillation on Xarelto, CAD s/p PCI, HFrEF (45-50%), Pulm HTN, CKD 3 and gastric MALT lymphoma of stomach s/p chemoradiation in remission was brought to ED for worsening SOB, weakness and lethargy.    1. Atrial Fibrillation with RVR  HR controlled now   continue Metoprolol and Xarelto     2. VANI on CKD stage 3: improved   Cr now stable at 1.4  lasix resumed    3. Chronic HFmrEF  lasix 40mg daily resumed today and pt is urinating  on metoprolol  ACE-I/ARB not given due to VANI - re-evaluate as outpt  no further intervention per cardiology   outpt f/u    4. sigmoid colon and rectal thickening on CT:   previous hospitalist discussed this finding with the pt and she does not want any further workup    5. Lactic acidosis improved    6. HTN - on metoprolol and lasix    7. CAD s/p stents   on ASA and metoprolol  Troponin mildly elevated likely from tachycardia    8. Acute hypoxemic respiratory failure on admission - now improved  pt on 2L NC  wean O2 as tolerated        PROGRESS NOTE HANDOFF    Pending: COVID swab today    Disposition: STR at [facility]    Medication reconciliation reviewed with the resident    Spent 35 minutes on the discharge process of the pt

## 2020-12-01 NOTE — DISCHARGE NOTE PROVIDER - CARE PROVIDERS DIRECT ADDRESSES
,DirectAddress_Unknown,[email or web address]

## 2020-12-01 NOTE — DISCHARGE NOTE PROVIDER - CARE PROVIDER_API CALL
Erich Lopez (DO)  Internal Medicine  [address] [place] [address]  Phone: [phone]  Fax: [phone]  Follow Up Time: 2 weeks    Pola Rivera (MD)  Cardiovascular Disease; Internal Medicine; Interventional Cardiology  [address] [place] [address], [place] [address]  Phone: [phone]  Fax: [phone]  Follow Up Time: 2 weeks

## 2020-12-01 NOTE — DISCHARGE NOTE PROVIDER - PROVIDER TOKENS
PROVIDER:[TOKEN:[[id number]],FOLLOWUP:[2 weeks]],PROVIDER:[TOKEN:[[id number]],FOLLOWUP:[2 weeks]]

## 2020-12-01 NOTE — PROGRESS NOTE ADULT - SUBJECTIVE AND OBJECTIVE BOX
Discharge note    BRI AGUILAR  80y Female    INTERVAL HPI/OVERNIGHT EVENTS:    Pt lying in bed in no distress  States she has SOB but speaking in full sentences     T(F): 96.5 (12-01-20 @ 05:51), Max: 98 (12-01-20 @ 01:00)  HR: 92 (12-01-20 @ 05:51) (87 - 118)  BP: 146/78 (12-01-20 @ 05:00) (123/67 - 150/83)  RR: 18 (12-01-20 @ 05:51) (18 - 18)  SpO2: 100% (12-01-20 @ 07:30) (95% - 100%) on 2L NC    I&O's Summary    30 Nov 2020 07:01  -  01 Dec 2020 07:00  --------------------------------------------------------  IN: 340 mL / OUT: 1830 mL / NET: -1490 mL    01 Dec 2020 07:01  -  01 Dec 2020 12:11  --------------------------------------------------------  IN: 120 mL / OUT: 0 mL / NET: 120 mL      PHYSICAL EXAM:  GENERAL: NAD  HEAD:  Normocephalic  EYES:  conjunctiva and sclera clear  ENMT: Moist mucous membranes  NECK: Supple  NERVOUS SYSTEM:  Alert, awake, Good concentration  CHEST/LUNG: Clear to percussion bilaterally  HEART: IRR  ABDOMEN: Soft, Nontender, Nondistended  EXTREMITIES:   No edema     Consultant(s) Notes Reviewed:  [x ] YES  [ ] NO  Care Discussed with Consultants/Other Providers [ x] YES  [ ] NO    MEDICATIONS  (STANDING):  aspirin  chewable 81 milliGRAM(s) Oral daily  chlorhexidine 4% Liquid 1 Application(s) Topical <User Schedule>  furosemide    Tablet 40 milliGRAM(s) Oral daily  levothyroxine 25 MICROGram(s) Oral daily  megestrol 40 milliGRAM(s) Oral daily  metoprolol succinate  milliGRAM(s) Oral daily  mirtazapine 30 milliGRAM(s) Oral at bedtime  multivitamin 1 Tablet(s) Oral daily  pantoprazole    Tablet 40 milliGRAM(s) Oral before breakfast  rivaroxaban 20 milliGRAM(s) Oral daily    MEDICATIONS  (PRN):      LABS:                        13.5   9.45  )-----------( 189      ( 30 Nov 2020 19:30 )             41.1     11-30    136  |  97<L>  |  28<H>  ----------------------------<  101<H>  3.8   |  26  |  1.4    Ca    8.6      30 Nov 2020 19:30  Mg     2.1     11-30    TPro  5.8<L>  /  Alb  3.2<L>  /  TBili  0.4  /  DBili  x   /  AST  27  /  ALT  18  /  AlkPhos  80  11-30        Culture - Blood (collected 29 Nov 2020 05:03)  Source: .Blood None  Preliminary Report (30 Nov 2020 13:02):    No growth to date.    Culture - Blood (collected 28 Nov 2020 23:54)  Source: .Blood None  Preliminary Report (30 Nov 2020 07:01):    No growth to date.        RADIOLOGY & ADDITIONAL TESTS:    Imaging or report Personally Reviewed:  [x ] YES  [ ] NO    < from: TTE Echo Complete w/o Contrast w/ Doppler (11.29.20 @ 12:45) >  Summary:   1. Left ventricular ejection fraction, by visual estimation, is 35 to 40%.   2. Moderately enlarged left atrium.   3. Moderately enlarged right atrium.   4. Mitral annular calcification.   5. Moderate mitral valve regurgitation.   6. Mild tricuspid regurgitation.    < end of copied text >      Case discussed with resident    Care discussed with pt

## 2020-12-01 NOTE — DISCHARGE NOTE PROVIDER - ATTENDING COMMENTS
A/P:-  Pt is seen and evaluated by bedside.     1. Acute hypoxic respiratory failure 2/2 pleural effusion  2. Chronic Atrial Fibrillation with RVR  3. CKD stage 3  4. Chronic HFrEF   5. sigmoid colon and rectal thickening on CT  6. HTN  7. CAD s/p stent     - patient was on 2 L NC during hospital stay - now off supplemental O2  - xray 12/1 suggestive of worsening pleural effusion   - repeat Xray on 12/2 after restarting lasix - improvement in pleural effusion   - HR controlled now   - continue Metoprolol and Xarelto   - Cr now stable at 1.4  - home dose lasix - continue   - continue on metoprolol  -  will need outpatient follow up with cardiology for optimization of medications   - CT scan finds were discussed with patient on admission and she does not want further work up   - home medications reviewed and restarted as indicated.     Dispo: STR at [facility].   COVID negative obtained 12/1 for discharge planning.   patient is hemodynamically stable and ready for discharge.    Plan of care was discussed with patient ; all questions and concerns were addressed and care was aligned with patient's wishes.

## 2020-12-02 PROCEDURE — 71045 X-RAY EXAM CHEST 1 VIEW: CPT | Mod: 26

## 2020-12-02 PROCEDURE — 99233 SBSQ HOSP IP/OBS HIGH 50: CPT

## 2020-12-02 RX ADMIN — Medication 1 TABLET(S): at 11:26

## 2020-12-02 RX ADMIN — Medication 100 MILLIGRAM(S): at 05:12

## 2020-12-02 RX ADMIN — Medication 40 MILLIGRAM(S): at 05:12

## 2020-12-02 RX ADMIN — RIVAROXABAN 20 MILLIGRAM(S): KIT at 19:23

## 2020-12-02 RX ADMIN — Medication 81 MILLIGRAM(S): at 11:26

## 2020-12-02 RX ADMIN — MEGESTROL ACETATE 40 MILLIGRAM(S): 40 SUSPENSION ORAL at 11:26

## 2020-12-02 RX ADMIN — MIRTAZAPINE 30 MILLIGRAM(S): 45 TABLET, ORALLY DISINTEGRATING ORAL at 21:42

## 2020-12-02 RX ADMIN — CHLORHEXIDINE GLUCONATE 1 APPLICATION(S): 213 SOLUTION TOPICAL at 05:12

## 2020-12-02 RX ADMIN — Medication 25 MICROGRAM(S): at 05:12

## 2020-12-02 RX ADMIN — PANTOPRAZOLE SODIUM 40 MILLIGRAM(S): 20 TABLET, DELAYED RELEASE ORAL at 05:12

## 2020-12-02 NOTE — PROGRESS NOTE ADULT - SUBJECTIVE AND OBJECTIVE BOX
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 (01 Dec 2020 10:50)  Currently admitted to medicine with the primary diagnosis of CHF exacerbation    Today is hospital day 4d, and this morning she was laying flat and woken up from her sleep. She reports improvement in her shortness of breath. However, she states she is feeling too weak to go home and is very tired. She reports no significant events overnight.       OBJECTIVE  PAST MEDICAL & SURGICAL HISTORY  History of stomach cancer  25 years ago-- had stomach surgery    Hyperlipidemia    CAD S/P percutaneous coronary angioplasty    Atrial fibrillation    HTN (hypertension)    Diverticulitis    S/P small bowel resection    H/O abdominal hysterectomy      ALLERGIES:  penicillin (Anaphylaxis)  penicillin (Unknown)    MEDICATIONS:  STANDING MEDICATIONS  aspirin  chewable 81 milliGRAM(s) Oral daily  chlorhexidine 4% Liquid 1 Application(s) Topical <User Schedule>  furosemide    Tablet 40 milliGRAM(s) Oral daily  levothyroxine 25 MICROGram(s) Oral daily  megestrol 40 milliGRAM(s) Oral daily  metoprolol succinate  milliGRAM(s) Oral daily  mirtazapine 30 milliGRAM(s) Oral at bedtime  multivitamin 1 Tablet(s) Oral daily  pantoprazole    Tablet 40 milliGRAM(s) Oral before breakfast  rivaroxaban 20 milliGRAM(s) Oral daily    PRN MEDICATIONS      VITAL SIGNS: Last 24 Hours  T(C): 35.7 (02 Dec 2020 05:09), Max: 37.2 (01 Dec 2020 14:15)  T(F): 96.3 (02 Dec 2020 05:09), Max: 98.9 (01 Dec 2020 14:15)  HR: 103 (02 Dec 2020 05:09) (61 - 103)  BP: 140/90 (02 Dec 2020 05:08) (140/90 - 158/97)  BP(mean): --  RR: 19 (02 Dec 2020 09:46) (18 - 20)  SpO2: 96% (02 Dec 2020 09:46) (85% - 98%)    LABS:                        13.5   9.45  )-----------( 189      ( 30 Nov 2020 19:30 )             41.1     11-30    136  |  97<L>  |  28<H>  ----------------------------<  101<H>  3.8   |  26  |  1.4    Ca    8.6      30 Nov 2020 19:30  Mg     2.1     11-30    TPro  5.8<L>  /  Alb  3.2<L>  /  TBili  0.4  /  DBili  x   /  AST  27  /  ALT  18  /  AlkPhos  80  11-30                  RADIOLOGY:      PHYSICAL EXAM:    GENERAL: NAD, laying flat in her bed with lights off   HEENT:  Atraumatic, Normocephalic. EOMI, PERRLA, conjunctiva and sclera clear, No JVD  PULMONARY: Clear to auscultation bilaterally; No wheeze  CARDIOVASCULAR: Regular rate and rhythm; No murmurs, rubs, or gallops  GASTROINTESTINAL: Soft, Nontender, Nondistended; Bowel sounds present  MUSCULOSKELETAL:  2+ Peripheral Pulses, No clubbing, cyanosis, or edema  NEUROLOGY: non-focal  SKIN: No rashes or lesions      ADMISSION SUMMARY  Patient is a 80y old Female who presents with a chief complaint of Shortness of breath (01 Dec 2020 12:33)  Currently admitted to medicine with the primary diagnosis of CHF exacerbation    Hospital course has been complicated by _______.       ASSESSMENT & PLAN    1. CHF EXACERBATION;PULMONARY HYPERTENSION;ATRIAL FIBRILLATION WITH RVR  ...    CHF EXACERBATION;PULMONARY HYPERTENSION;ATRIAL FIBRILLATION WITH RVR        2.    3. History of stomach cancer  25 years ago-- had stomach surgery    Hyperlipidemia    CAD S/P percutaneous coronary angioplasty    Atrial fibrillation    HTN (hypertension)    Diverticulitis          Present today:  ( ) Congestive Heart Failure, Yes? ( )Acute / ( )Acute on Chronic / ( )Chronic  :  ( )Systolic / ( )Diastolic               Plan:  ( ) Complicated Pneumonia, Type?  ( )Parapneumonic effusion / ( )Abscess / ( ) Multilobar / ( )Other               Plan:  ( ) Morbid Obesity, Yes? 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 (01 Dec 2020 10:50)  Currently admitted to medicine with the primary diagnosis of CHF exacerbation    Today is hospital day 4d, and this morning she was laying flat and woken up from her sleep. She reports improvement in her shortness of breath. However, she states she is feeling too weak to go home and is very tired. She reports no significant events overnight.       OBJECTIVE  PAST MEDICAL & SURGICAL HISTORY  History of stomach cancer  25 years ago-- had stomach surgery    Hyperlipidemia    CAD S/P percutaneous coronary angioplasty    Atrial fibrillation    HTN (hypertension)    Diverticulitis    S/P small bowel resection    H/O abdominal hysterectomy      ALLERGIES:  penicillin (Anaphylaxis)  penicillin (Unknown)    MEDICATIONS:  STANDING MEDICATIONS  aspirin  chewable 81 milliGRAM(s) Oral daily  chlorhexidine 4% Liquid 1 Application(s) Topical <User Schedule>  furosemide    Tablet 40 milliGRAM(s) Oral daily  levothyroxine 25 MICROGram(s) Oral daily  megestrol 40 milliGRAM(s) Oral daily  metoprolol succinate  milliGRAM(s) Oral daily  mirtazapine 30 milliGRAM(s) Oral at bedtime  multivitamin 1 Tablet(s) Oral daily  pantoprazole    Tablet 40 milliGRAM(s) Oral before breakfast  rivaroxaban 20 milliGRAM(s) Oral daily    PRN MEDICATIONS      VITAL SIGNS: Last 24 Hours  T(C): 35.7 (02 Dec 2020 05:09), Max: 37.2 (01 Dec 2020 14:15)  T(F): 96.3 (02 Dec 2020 05:09), Max: 98.9 (01 Dec 2020 14:15)  HR: 103 (02 Dec 2020 05:09) (61 - 103)  BP: 140/90 (02 Dec 2020 05:08) (140/90 - 158/97)  BP(mean): --  RR: 19 (02 Dec 2020 09:46) (18 - 20)  SpO2: 96% (02 Dec 2020 09:46) (85% - 98%)    LABS:                        13.5   9.45  )-----------( 189      ( 30 Nov 2020 19:30 )             41.1     11-30    136  |  97<L>  |  28<H>  ----------------------------<  101<H>  3.8   |  26  |  1.4    Ca    8.6      30 Nov 2020 19:30  Mg     2.1     11-30    TPro  5.8<L>  /  Alb  3.2<L>  /  TBili  0.4  /  DBili  x   /  AST  27  /  ALT  18  /  AlkPhos  80  11-30        RADIOLOGY:  11/27 CT Angio negative for PE  11/27 CXR showed small right pleural effusion   11/28 CT of the Abdomen showed circumferential wall thickening of the distal sigmoid and rectum  11/28 ECHO showed left ventricular ejection fraction of 35-40%     PHYSICAL EXAM:    GENERAL: NAD, laying flat in her bed with lights off   HEENT:  Atraumatic, Normocephalic. EOMI, PERRLA, conjunctiva and sclera clear, No JVD  PULMONARY: Clear to auscultation bilaterally; No wheeze  CARDIOVASCULAR: Regular rate and rhythm; No murmurs, rubs, or gallops  GASTROINTESTINAL: Soft, Nontender, Nondistended; Bowel sounds present  MUSCULOSKELETAL:  2+ Peripheral Pulses, No clubbing, cyanosis, or edema  NEUROLOGY: non-focal  SKIN: No rashes or lesions               BRI AGUILAR 80y Female  MRN#: [id number]   CODE STATUS: Full       SUBJECTIVE    Patient is a 80y old Female with a significant medical history of HTN, pHTN, Atrial fibrillation on Xarelto, CAD s/p PCI with stents, HFrEF (45-50% 4/2020), CKD stage 3-4, and 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 (01 Dec 2020 10:50)  Currently admitted to medicine with the primary diagnosis of CHF exacerbation    Today is hospital day 4d, and this morning she was laying flat and woken up from her sleep. She reports improvement in her shortness of breath. However, she states she is feeling too weak to go home and is very tired. She reports no significant events overnight.       OBJECTIVE  PAST MEDICAL & SURGICAL HISTORY  History of stomach cancer  25 years ago-- had stomach surgery    Hyperlipidemia    CAD S/P percutaneous coronary angioplasty    Atrial fibrillation    HTN (hypertension)    Diverticulitis    S/P small bowel resection    H/O abdominal hysterectomy      ALLERGIES:  penicillin (Anaphylaxis)  penicillin (Unknown)    MEDICATIONS:  STANDING MEDICATIONS  aspirin  chewable 81 milliGRAM(s) Oral daily  chlorhexidine 4% Liquid 1 Application(s) Topical <User Schedule>  furosemide    Tablet 40 milliGRAM(s) Oral daily  levothyroxine 25 MICROGram(s) Oral daily  megestrol 40 milliGRAM(s) Oral daily  metoprolol succinate  milliGRAM(s) Oral daily  mirtazapine 30 milliGRAM(s) Oral at bedtime  multivitamin 1 Tablet(s) Oral daily  pantoprazole    Tablet 40 milliGRAM(s) Oral before breakfast  rivaroxaban 20 milliGRAM(s) Oral daily    PRN MEDICATIONS      VITAL SIGNS: Last 24 Hours  T(C): 35.7 (02 Dec 2020 05:09), Max: 37.2 (01 Dec 2020 14:15)  T(F): 96.3 (02 Dec 2020 05:09), Max: 98.9 (01 Dec 2020 14:15)  HR: 103 (02 Dec 2020 05:09) (61 - 103)  BP: 140/90 (02 Dec 2020 05:08) (140/90 - 158/97)  BP(mean): --  RR: 19 (02 Dec 2020 09:46) (18 - 20)  SpO2: 96% (02 Dec 2020 09:46) (85% - 98%)    LABS:                        13.5   9.45  )-----------( 189      ( 30 Nov 2020 19:30 )             41.1     11-30    136  |  97<L>  |  28<H>  ----------------------------<  101<H>  3.8   |  26  |  1.4    Ca    8.6      30 Nov 2020 19:30  Mg     2.1     11-30    TPro  5.8<L>  /  Alb  3.2<L>  /  TBili  0.4  /  DBili  x   /  AST  27  /  ALT  18  /  AlkPhos  80  11-30        RADIOLOGY:  11/27 CT Angio negative for PE  11/27 CXR showed small right pleural effusion   11/28 CT of the Abdomen showed circumferential wall thickening of the distal sigmoid and rectum  11/28 ECHO showed left ventricular ejection fraction of 35-40%     PHYSICAL EXAM:    GENERAL: NAD, laying propped up in her bed with lights off. NC in place   HEENT:  Atraumatic, Normocephalic. EOMI, conjunctiva and sclera clear, No JVD  PULMONARY: Slight decrease in air movement in RLL, clear breath sounds in left lung   CARDIOVASCULAR: Regular rate and rhythm; No murmurs, rubs, or gallops  GASTROINTESTINAL: Soft, Nontender, Nondistended; Bowel sounds present  MUSCULOSKELETAL:  No clubbing or cyanosis. B/l non-pitting edema   NEUROLOGY: AAOx3  SKIN: No lesions or rashes observed

## 2020-12-02 NOTE — PROGRESS NOTE ADULT - ASSESSMENT
A/P:-  Pt is seen and evaluated by bedside.     1. Acute hypoxic respiratory failure   2. Atrial Fibrillation with RVR  3. CKD stage 3  4. Chronic HFrEF   5. sigmoid colon and rectal thickening on CT  6. HTN  7. CAD s/p stent     - patient was on 2 L NC during hospital stay   - xray yesterday suggestive of worsening pleural effusion   - follow repeat xray today   - yesterday restarted on home dose lasix - will taper off O2 as tolerated   - HR controlled now   - continue Metoprolol and Xarelto   - Cr now stable at 1.4  - home dose lasix resumed  - continue on metoprolol  -  will need outpatient follow up with cardiology for optimization of medications   - CT scan finds were discussed with patient on admission and she does not want further work up   - home medications reviewed and restarted as indicated.     Dispo: STR at [facility]. Likely in AM after tapering oxygen.   COVID negative obtained yesterday for discharge planning.     Plan of care was discussed with patient ; all questions and concerns were addressed and care was aligned with patient's wishes. A/P:-  Pt is seen and evaluated by bedside.     1. Acute hypoxic respiratory failure 2/2 pleural effusion  2. Chronic Atrial Fibrillation with RVR  3. CKD stage 3  4. Chronic HFrEF   5. sigmoid colon and rectal thickening on CT  6. HTN  7. CAD s/p stent     - patient was on 2 L NC during hospital stay   - xray yesterday suggestive of worsening pleural effusion   - follow repeat xray today   - yesterday restarted on home dose lasix - will taper off O2 as tolerated   - HR controlled now   - continue Metoprolol and Xarelto   - Cr now stable at 1.4  - home dose lasix resumed  - continue on metoprolol  -  will need outpatient follow up with cardiology for optimization of medications   - CT scan finds were discussed with patient on admission and she does not want further work up   - home medications reviewed and restarted as indicated.     Dispo: STR at [facility]. Likely in AM after tapering oxygen.   COVID negative obtained yesterday for discharge planning.     Plan of care was discussed with patient ; all questions and concerns were addressed and care was aligned with patient's wishes.

## 2020-12-02 NOTE — PROGRESS NOTE ADULT - ASSESSMENT
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 (01 Dec 2020 10:50)    Currently admitted to medicine with the primary diagnosis of CHF exacerbation.       ASSESSMENT & PLAN    #Atrial fibrillation with RVR  - On admission,  EKG showed atrial fibrillation with RVR  - 11/28 EKG showed NSR  - Rate controlled now with Metoprolol 100 mg PO daily   - 4/2020 ECHO showed EF of 45-50% with moderate pHTN  - Repeat echo (11/29) showed Left ventricular ejection fraction, by visual estimation, 35 to 40%.  - Continue Xarelto 20mg PO daily for anticoagulation  - Continue Metoprolol 100 mg PO daily   - F/u outpatient cardiology     #CAD s/p stents and elevated troponin   - trending downwards  - unlikely ACS  - Continue ASA, Beta-blockers, Anticoagulation    #Hypothyroidism   - TSH of 3.49 (wnl)   - Continue Synthroid 25 mcg daily     #VANI on CKD- resolved   - Held Lasix and given gentle hydration  - May restart Lasix 40 mg PO daily   - Monitor renal function, electrolytes, daily weights and volume status     #HTN- well controlled  - Continue Metoprolol 100 mg PO daily and monitor blood pressure     #MALToma s/p chemoradiation in remission and PUD  - 11/28 CT scan of the abdomen showed circumferential wall thickening of the distal sigmoid and rectum  - Patient says she never had colonoscopy and is declining any further work up for this thickening  - Continue Protonix 40mg PO daily   - F/u outpatient with gastroenterologist     # DVT ppx: pt on Xarelto 20mg PO daily    # GI ppx: Protonix 40mg daily   # Diet: DASH  # Activity: IAT    # Dispo: SNF tomorrow  - COVID swab 12/01 negative   # Code: FULL   Patient is a 80y old Female with a significant medical history of HTN, pHTN, Atrial fibrillation on Xarelto, CAD s/p PCI with stents, HFrEF (45-50% 4/2020), CKD stage 3-4, and 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 (01 Dec 2020 10:50)    Currently admitted to medicine with the primary diagnosis of CHF exacerbation.       ASSESSMENT & PLAN    #Shortness of breath secondary to CHF exacerbation due to Atrial fibrillation with RVR   - On admission,  EKG showed atrial fibrillation with RVR  - 11/28 EKG showed NSR  - Rate controlled now with Metoprolol 100 mg PO daily   - 11/27 CT Angio negative for PE  - 11/27 CXR showed small right pleural effusion   - 4/2020 ECHO showed EF of 45-50% with moderate pHTN  - Repeat echo (11/29) showed Left ventricular ejection fraction, by visual estimation, 35 to 40%.  - Continue Xarelto 20mg PO daily for anticoagulation  - Continue Metoprolol 100 mg PO daily   - F/u outpatient cardiology     #CAD s/p stents and elevated troponin   - trending downwards  - unlikely ACS  - Continue ASA, Beta-blockers, Anticoagulation    #Hypothyroidism   - TSH of 3.49 (wnl)   - Continue Synthroid 25 mcg daily     #VANI on CKD- resolved   - Held Lasix and given gentle hydration  - May restart Lasix 40 mg PO daily   - Monitor renal function, electrolytes, daily weights and volume status     #HTN- well controlled  - Continue Metoprolol 100 mg PO daily and monitor blood pressure     #MALToma s/p chemoradiation in remission and PUD  - 11/28 CT scan of the abdomen showed circumferential wall thickening of the distal sigmoid and rectum  - Patient says she never had colonoscopy and is declining any further work up for this thickening  - Continue Protonix 40mg PO daily   - F/u outpatient with gastroenterologist     # DVT ppx: pt on Xarelto 20mg PO daily    # GI ppx: Protonix 40mg daily   # Diet: DASH  # Activity: IAT    # Dispo: SNF tomorrow  - COVID swab 12/01 negative   # Code: FULL   Patient is a 80y old Female with a significant medical history of HTN, pHTN, Atrial fibrillation on Xarelto, CAD s/p PCI with stents, HFrEF (45-50% 4/2020), CKD stage 3-4, and 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 (01 Dec 2020 10:50)    Currently admitted to medicine with the primary diagnosis of CHF exacerbation.       ASSESSMENT & PLAN    #Shortness of breath secondary to CHF exacerbation due to Atrial fibrillation with RVR - improving   - symptoms present on admission   - On admission,  EKG showed atrial fibrillation with RVR  - 11/28 EKG showed NSR  - Rate controlled now with Metoprolol 100 mg PO daily   - 11/27 CT Angio negative for PE  - 11/27 CXR showed small right pleural effusion   - 4/2020 ECHO showed EF of 45-50% with moderate pHTN  - Repeat echo (11/29) showed Left ventricular ejection fraction, by visual estimation, 35 to 40%.  - Continue Xarelto 20mg PO daily for anticoagulation  - Continue Metoprolol 100 mg PO daily   - pt on 2L NC, satting at 96%, but on RA- patient is satting at 85%  - PT consulted and unable to walk around with patient due to her weakness    - F/u outpatient cardiology     #CAD s/p stents and elevated troponin   - trending downwards  - unlikely ACS  - Continue ASA, Beta-blockers, Anticoagulation    #Hypothyroidism   - TSH of 3.49 (wnl)   - Continue Synthroid 25 mcg daily     #VANI on CKD- resolved   - Held Lasix and given gentle hydration  - May restart Lasix 40 mg PO daily   - Monitor renal function, electrolytes, daily weights and volume status     #HTN- well controlled  - Continue Metoprolol 100 mg PO daily and monitor blood pressure     #MALToma s/p chemoradiation in remission and PUD  - 11/28 CT scan of the abdomen showed circumferential wall thickening of the distal sigmoid and rectum  - Patient says she never had colonoscopy and is declining any further work up for this thickening  - Continue Protonix 40mg PO daily   - F/u outpatient with gastroenterologist     # DVT ppx: pt on Xarelto 20mg PO daily    # GI ppx: Protonix 40mg daily   # Diet: DASH  # Activity: IAT    # Dispo: SNF tomorrow  - COVID swab 12/01 negative   # Code: FULL   Patient is a 80y old Female with a significant medical history of HTN, pHTN, Atrial fibrillation on Xarelto, CAD s/p PCI with stents, HFrEF (45-50% 4/2020), CKD stage 3-4, and 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 (01 Dec 2020 10:50)    Currently admitted to medicine with the primary diagnosis of CHF exacerbation.       ASSESSMENT & PLAN    #Shortness of breath secondary to CHF exacerbation due to Atrial fibrillation with RVR - improving   - symptoms present on admission   - On admission,  EKG showed atrial fibrillation with RVR  - 11/28 EKG showed NSR  - Rate controlled now with Metoprolol 100 mg PO daily   - 11/27 CT Angio negative for PE  - 11/27 CXR showed small right pleural effusion   - 4/2020 ECHO showed EF of 45-50% with moderate pHTN  - Repeat echo (11/29) showed Left ventricular ejection fraction, by visual estimation, 35 to 40%.  - Continue Xarelto 20mg PO daily for anticoagulation  - Continue Metoprolol 100 mg PO daily   - acute res failure resolved pt still has SOB on RA. NAD  - pt on 2L NC, satting at 96%, but on RA- patient is satting at 85%  - PT consulted and unable to walk around with patient due to her weakness, pt approved for STR    - F/u outpatient cardiology     #CAD s/p stents and elevated troponin   - trending downwards  - unlikely ACS  - Continue ASA, Beta-blockers, Anticoagulation    #Hypothyroidism   - TSH of 3.49 (wnl)   - Continue Synthroid 25 mcg daily     #VANI on CKD- resolved   - Held Lasix and given gentle hydration  - May restart Lasix 40 mg PO daily   - Monitor renal function, electrolytes, daily weights and volume status     #HTN- well controlled  - Continue Metoprolol 100 mg PO daily and monitor blood pressure     #MALToma s/p chemoradiation in remission and PUD  - 11/28 CT scan of the abdomen showed circumferential wall thickening of the distal sigmoid and rectum  - Patient says she never had colonoscopy and is declining any further work up for this thickening  - Continue Protonix 40mg PO daily   - F/u outpatient with gastroenterologist     # DVT ppx: pt on Xarelto 20mg PO daily    # GI ppx: Protonix 40mg daily   # Diet: DASH  # Activity: IAT    # Dispo: SNF tomorrow  - COVID swab 12/01 negative   # Code: FULL

## 2020-12-02 NOTE — CDI QUERY NOTE - NSCDIOTHERTXTBX_GEN_ALL_CORE_HH
Clinical Indicators:  11/28> 80F w/CP, found by family hypoxic and confused.    11/28 Cardiology Consult> In the ED, she was hypoxic to the 80's on room air and placed on 4LNC which improved her SpO2 to 100% she was on BiPAP for a short time then placed back on NC.  12/2 Remain on 2LNC with O2 sat 85-96%    Based on above clinical findings and your professional judgment please indicate the clinical significance of above findings such as    ?	Acute Hypoxic respiratory failure, resolved  ?	Not clinically significant  ?	Other please specify  ?	Clinically unable to determine

## 2020-12-02 NOTE — PROGRESS NOTE ADULT - SUBJECTIVE AND OBJECTIVE BOX
Sachi García MD  Hospitalist   Spectra: [phone]    Patient is a 80y old  Female who presents with a chief complaint of Shortness of breath (02 Dec 2020 10:35)      INTERVAL HPI/OVERNIGHT EVENTS: Patient is comfortably sitting in chair. Currently on 2L nasal canula - earlier this AM attempted to taper off oxygen after which she was noted desatting to 86%.     REVIEW OF SYSTEMS: negative  Vital Signs Last 24 Hrs  T(C): 35.5 (02 Dec 2020 14:15), Max: 35.9 (01 Dec 2020 21:19)  T(F): 95.9 (02 Dec 2020 14:15), Max: 96.7 (01 Dec 2020 21:19)  HR: 104 (02 Dec 2020 14:15) (61 - 104)  BP: 151/80 (02 Dec 2020 14:15) (140/90 - 152/86)  BP(mean): --  RR: 20 (02 Dec 2020 14:15) (18 - 20)  SpO2: 96% (02 Dec 2020 09:46) (85% - 98%)    PHYSICAL EXAM:   NAD; Normocephalic;   LUNGS - no wheezing  HEART: S1 S2+   ABDOMEN: Soft, Nontender, non distended  EXTREMITIES: no cyanosis; no edema  NERVOUS SYSTEM:  Awake and alert; no focal neuro deficits appreciated    LABS:                        13.5   9.45  )-----------( 189      ( 30 Nov 2020 19:30 )             41.1     11-30    136  |  97<L>  |  28<H>  ----------------------------<  101<H>  3.8   |  26  |  1.4    Ca    8.6      30 Nov 2020 19:30  Mg     2.1     11-30    TPro  5.8<L>  /  Alb  3.2<L>  /  TBili  0.4  /  DBili  x   /  AST  27  /  ALT  18  /  AlkPhos  80  11-30

## 2020-12-03 ENCOUNTER — TRANSCRIPTION ENCOUNTER (OUTPATIENT)
Age: 80
End: 2020-12-03

## 2020-12-03 VITALS — SYSTOLIC BLOOD PRESSURE: 124 MMHG | DIASTOLIC BLOOD PRESSURE: 85 MMHG | HEART RATE: 90 BPM

## 2020-12-03 PROCEDURE — 99233 SBSQ HOSP IP/OBS HIGH 50: CPT | Mod: GC

## 2020-12-03 RX ORDER — LIDOCAINE 4 G/100G
1 CREAM TOPICAL ONCE
Refills: 0 | Status: DISCONTINUED | OUTPATIENT
Start: 2020-12-03 | End: 2020-12-03

## 2020-12-03 RX ADMIN — Medication 1 TABLET(S): at 11:13

## 2020-12-03 RX ADMIN — Medication 40 MILLIGRAM(S): at 06:01

## 2020-12-03 RX ADMIN — Medication 100 MILLIGRAM(S): at 06:01

## 2020-12-03 RX ADMIN — Medication 25 MICROGRAM(S): at 06:01

## 2020-12-03 RX ADMIN — PANTOPRAZOLE SODIUM 40 MILLIGRAM(S): 20 TABLET, DELAYED RELEASE ORAL at 06:01

## 2020-12-03 RX ADMIN — Medication 81 MILLIGRAM(S): at 11:13

## 2020-12-03 RX ADMIN — CHLORHEXIDINE GLUCONATE 1 APPLICATION(S): 213 SOLUTION TOPICAL at 06:01

## 2020-12-03 RX ADMIN — MEGESTROL ACETATE 40 MILLIGRAM(S): 40 SUSPENSION ORAL at 11:13

## 2020-12-03 NOTE — DIETITIAN INITIAL EVALUATION ADULT. - MALNUTRITION
Malnutrition criteria not met at this time severe protein calorie malnutrition in context of chronic illness

## 2020-12-03 NOTE — DIETITIAN INITIAL EVALUATION ADULT. - PERSON TAUGHT/METHOD
verbal instruction/Inappropriate to provide nutrition education and discharge materials at time of visit as pt was lethargic/falling asleep/patient instructed verbal instruction/Encouraged daughter to continue providing pt with oral nutrition supplements and provide meals with plastic utensils vs metallic utensils and provide more cold food options vs hot food options d/t taste changes. Provided sources of protein that are plant based as pt has aversion to meat./daughter instructed

## 2020-12-03 NOTE — DIETITIAN INITIAL EVALUATION ADULT. - OTHER INFO
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 (resolving) Pt to go to SNF.       Pt reports fair po intake PTA. Lethargic at time of assessment. Denies use of oral nutrition supplements, vitamins, or minerals. Confirms NKFA. No Jewish or cultural food preferences. Denies difficulties chewing or swallowing.     In house appetite and po intake are decreased, consuming <50% of her meals. On appetite stimulant. No c/o N+V, constipation or diarrhea. No BM in EMR, pt does not remember last BM. Amenable to receive pudding (chocolate)     UBW ~160lbs. Stated UBW consistent with current adm dosing wt.     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 (resolving) Pt to go to SNF.       Daughter reports pt with poor appetite and po intake PTA. Lethargic at time of assessment. Endorses taste changes since receiving chemotherapy years ago that have no resolved. Was drinking Premier protein shakes and Ensure Enlive in NH. Takes iron pill. Confirms NKFA. No Oriental orthodox or cultural food preferences. Denies difficulties swallowing, wears dentures that daughter reports bothers the pt.      In house appetite and po intake are decreased, consuming <50% of her meals. On appetite stimulant. No c/o N+V, constipation or diarrhea. No BM in EMR, pt does not remember last BM. Amenable to receive pudding (chocolate)     UBW ~160lbs. ?accuracy as previous adm wt 201lbs (April, 2020) ~18.8% wt loss x 8 months. Daughter confirms pt lost a lot of wt since being admitted to [facility], didn't like food and had no appetite.     Ht:  66"   Wt:  162.7lbs   IBW:  130lbs +/-10%    BMI:  26.3 kg/m2       Skin: No pressure injuries per RN flow sheets  Edema: +3 R+L foot, ankle per RN flow sheets

## 2020-12-03 NOTE — CHART NOTE - TREATMENT: THE FOLLOWING DIET HAS BEEN RECOMMENDED
Diet, DASH/TLC:   Sodium & Cholesterol Restricted     Qty per Day:  CHOCOLATE  Prosource Gelatein Plus     Qty per Day:  2  Supplement Feeding Modality:  Oral  Ensure Enlive Cans or Servings Per Day:  1       Frequency:  Two Times a day  Ensure Pudding Cans or Servings Per Day:  1       Frequency:  Two Times a day (12-03-20 @ 11:32) [Pending Verification By Attending]  Diet, DASH/TLC:   Sodium & Cholesterol Restricted (11-28-20 @ 10:27) [Active]

## 2020-12-03 NOTE — CHART NOTE - NSCHARTNOTEFT_GEN_A_CORE
<<<RESIDENT DISCHARGE NOTE>>>     BRI AGUILAR  MRN-[id number]    VITAL SIGNS:  T(F): 97.2 (12-03-20 @ 14:04), Max: 97.5 (12-03-20 @ 05:29)  HR: 95 (12-03-20 @ 14:04)  BP: 170/79 (12-03-20 @ 14:04)  SpO2: 97% (12-03-20 @ 10:52)      PHYSICAL EXAMINATION:  General: NAD, sitting up in bed with nc off to the side  NECK: mild erythema where she spilled her tea, superficial  Pulmonary: improvement with mild crackles at the rt base, clear otherwise  Cardiovascular: s1,s2 regular rate and rhythm   Gastrointestinal/Abdomen & Pelvis: soft, non tender, non distended  Neurologic/Motor: AOx3, can move all 4 extremities   Ext: 2+ edema bl LE, non pitting     TEST RESULTS:              FINAL DISCHARGE INTERVIEW:  Resident(s) Present: (Name:Dr. Vincent), RN Present: (Name:  ___________)    DISCHARGE MEDICATION RECONCILIATION  reviewed with Attending (Name: Dr. García)    DISPOSITION:   [  ] Home,    [  ] Home with Visiting Nursing Services,   [    ]  SNF/ NH,    [   ] Acute Rehab (4A),   [   ] Other (Specify:_________)

## 2020-12-03 NOTE — DIETITIAN INITIAL EVALUATION ADULT. - NAME AND PHONE
Nutrition Interventions: meals and snacks, medical food supplements RD to monitor diet order, energy intake, body composition, NFPF

## 2020-12-03 NOTE — DIETITIAN INITIAL EVALUATION ADULT. - ADD RECOMMEND
1. Continue current diet order 2. Add Ensure pudding (chocolate) BID to optimize po intake 1. Continue current diet order 2. Add Ensure pudding (chocolate) BID to optimize po intake 3. Add Ensure Enlive BID 4. Add Prosource gelatin plus BID

## 2020-12-03 NOTE — DIETITIAN INITIAL EVALUATION ADULT. - OTHER CALCULATIONS
wt used: 73.8kg CBW kcal: 1458-1580kcal (MSJ x 1.2-1.3 AF) protein: 74-89g (1-1.2g/kg CBW) fluids: 1ml/kcal or per LIP wt used: 73.8kg CBW kcal: 1580-1823kcal (MSJ x 1.2-1.5 AF)-- severe PCM protein: 89-103g (1.2-1.4g/kg CBW) fluids: 1ml/kcal or per LIP

## 2020-12-03 NOTE — DISCHARGE NOTE NURSING/CASE MANAGEMENT/SOCIAL WORK - PATIENT PORTAL LINK FT
You can access the FollowMyHealth Patient Portal offered by [facility] by registering at the following website: [email or web address]. By joining [facility]’s FollowMyHealth portal, you will also be able to view your health information using other applications (apps) compatible with our system.

## 2020-12-03 NOTE — DIETITIAN INITIAL EVALUATION ADULT. - ETIOLOGY
decreased appetite d/t lethargy/fatigue decreased appetite d/t taste changes s/p chemo, fatigue, dislike for institutionalized food

## 2020-12-03 NOTE — DIETITIAN INITIAL EVALUATION ADULT. - NUTRITIONGOAL OUTCOME1
Pt to meet greater than 75% of estimated needs within 4 days Pt to meet greater than 75% of estimated needs and maintain 1-2lbs of CBW within 4 days

## 2020-12-04 LAB
CULTURE RESULTS: SIGNIFICANT CHANGE UP
CULTURE RESULTS: SIGNIFICANT CHANGE UP
SPECIMEN SOURCE: SIGNIFICANT CHANGE UP
SPECIMEN SOURCE: SIGNIFICANT CHANGE UP

## 2020-12-08 NOTE — CDI QUERY NOTE - NSCDIOTHERTXTBX_GEN_ALL_CORE_HH
Clinical Indicators:    Pt was Admitted on 11/28 11/28 H and P reflect ” she was not eating and drinking well the last few days and was more lethargic than usual.”    12/3 Dietitian was consulted with the following assessment:     Malnutrition severe protein calorie malnutrition in context of chronic illness.   Etiology decreased appetite d/t taste changes s/p chemo, fatigue, dislike for institutionalized food.    Signs/Symptoms <75% po intake >1 month, >10% wt loss x 6 months.  Intervention: 1. Continue current diet order 2. Add Ensure pudding (chocolate) BID to optimize po intake 3. Add Ensure Enlive BID 4. Add Prosource gelatin plus BID     Based on above clinical findings and your professional if you agree to above assessment please include in your progress Notes    ?	Severe protein calorie malnutrition ( Please include RD recommendations)  ?	Other ( Please Specify)  ?	 Clinically unable to determine

## 2020-12-16 DIAGNOSIS — I50.23 ACUTE ON CHRONIC SYSTOLIC (CONGESTIVE) HEART FAILURE: ICD-10-CM

## 2020-12-16 DIAGNOSIS — E43 UNSPECIFIED SEVERE PROTEIN-CALORIE MALNUTRITION: ICD-10-CM

## 2020-12-16 DIAGNOSIS — Z87.891 PERSONAL HISTORY OF NICOTINE DEPENDENCE: ICD-10-CM

## 2020-12-16 DIAGNOSIS — I13.0 HYPERTENSIVE HEART AND CHRONIC KIDNEY DISEASE WITH HEART FAILURE AND STAGE 1 THROUGH STAGE 4 CHRONIC KIDNEY DISEASE, OR UNSPECIFIED CHRONIC KIDNEY DISEASE: ICD-10-CM

## 2020-12-16 DIAGNOSIS — N17.9 ACUTE KIDNEY FAILURE, UNSPECIFIED: ICD-10-CM

## 2020-12-16 DIAGNOSIS — N18.4 CHRONIC KIDNEY DISEASE, STAGE 4 (SEVERE): ICD-10-CM

## 2020-12-16 DIAGNOSIS — Z90.710 ACQUIRED ABSENCE OF BOTH CERVIX AND UTERUS: ICD-10-CM

## 2020-12-16 DIAGNOSIS — C88.4 EXTRANODAL MARGINAL ZONE B-CELL LYMPHOMA OF MUCOSA-ASSOCIATED LYMPHOID TISSUE [MALT-LYMPHOMA]: ICD-10-CM

## 2020-12-16 DIAGNOSIS — Z95.5 PRESENCE OF CORONARY ANGIOPLASTY IMPLANT AND GRAFT: ICD-10-CM

## 2020-12-16 DIAGNOSIS — R77.8 OTHER SPECIFIED ABNORMALITIES OF PLASMA PROTEINS: ICD-10-CM

## 2020-12-16 DIAGNOSIS — Z85.028 PERSONAL HISTORY OF OTHER MALIGNANT NEOPLASM OF STOMACH: ICD-10-CM

## 2020-12-16 DIAGNOSIS — Z88.0 ALLERGY STATUS TO PENICILLIN: ICD-10-CM

## 2020-12-16 DIAGNOSIS — I25.10 ATHEROSCLEROTIC HEART DISEASE OF NATIVE CORONARY ARTERY WITHOUT ANGINA PECTORIS: ICD-10-CM

## 2020-12-16 DIAGNOSIS — E78.5 HYPERLIPIDEMIA, UNSPECIFIED: ICD-10-CM

## 2020-12-16 DIAGNOSIS — K27.9 PEPTIC ULCER, SITE UNSPECIFIED, UNSPECIFIED AS ACUTE OR CHRONIC, WITHOUT HEMORRHAGE OR PERFORATION: ICD-10-CM

## 2020-12-16 DIAGNOSIS — Z79.82 LONG TERM (CURRENT) USE OF ASPIRIN: ICD-10-CM

## 2020-12-16 DIAGNOSIS — Z79.51 LONG TERM (CURRENT) USE OF INHALED STEROIDS: ICD-10-CM

## 2020-12-16 DIAGNOSIS — J96.01 ACUTE RESPIRATORY FAILURE WITH HYPOXIA: ICD-10-CM

## 2020-12-16 DIAGNOSIS — E03.9 HYPOTHYROIDISM, UNSPECIFIED: ICD-10-CM

## 2020-12-16 DIAGNOSIS — I27.20 PULMONARY HYPERTENSION, UNSPECIFIED: ICD-10-CM

## 2020-12-16 DIAGNOSIS — E87.2 ACIDOSIS: ICD-10-CM

## 2020-12-16 DIAGNOSIS — I48.91 UNSPECIFIED ATRIAL FIBRILLATION: ICD-10-CM

## 2020-12-16 DIAGNOSIS — Z79.01 LONG TERM (CURRENT) USE OF ANTICOAGULANTS: ICD-10-CM

## 2021-03-16 NOTE — ED ADULT TRIAGE NOTE - LOCATION:
-- Message is from the [facility]--    Called and left voicemail to inform the patient the symptom listed for their [facility] appointment is out of scope.    ACC AGENT: If the patient calls back, use Symptom  to screen the symptom and then follow the outcome.    ACC AGENT: Cancel the [facility] appointment if it is confirmed that symptoms are out of scope.          
Left arm;

## 2021-10-06 PROBLEM — I10 ESSENTIAL HYPERTENSION: Status: ACTIVE | Noted: 2018-05-04

## 2021-12-10 NOTE — PATIENT PROFILE ADULT - DISASTER - NSPROGENPREVTRANSF_GEN_A_NUR
no/patient states she has never had a blood transfusion Opioid Counseling: I discussed with the patient the potential side effects of opioids including but not limited to addiction, altered mental status, and depression. I stressed avoiding alcohol, benzodiazepines, muscle relaxants and sleep aids unless specifically okayed by a physician. The patient verbalized understanding of the proper use and possible adverse effects of opioids. All of the patient's questions and concerns were addressed. They were instructed to flush the remaining pills down the toilet if they did not need them for pain.

## 2022-04-04 NOTE — PATIENT PROFILE ADULT - FALL HARM RISK
Dear Isaura Gray,    We are sorry you are not feeling well. Based on the responses you provided, it is recommended that you be seen in-person in urgent care so we can better evaluate your symptoms. Please click here to find the nearest urgent care location to you.   You will not be charged for this Visit. Thank you for trusting us with your care.    Teressa Curry MD      Diarrhea with Uncertain Cause (Adult)    Diarrhea is when stools are loose and watery. This can be caused by:    Viral infections    Bacterial infections    Food poisoning    Parasites    Irritable bowel syndrome (IBS)    Inflammatory bowel diseases such as ulcerative colitis, Crohn's disease, and celiac disease    Food intolerance, such as to lactose, the sugar found in milk and milk products    Reaction to medicines like antibiotics, laxatives, cancer drugs, and antacids  Along with diarrhea, you may also have:    Abdominal pain and cramping    Nausea and vomiting    Loss of bowel control    Fever and chills    Bloody stools  In some cases, antibiotics may help to treat diarrhea. You may have a stool sample test. This is done to see what is causing your diarrhea, and if antibiotics will help treat it. The results of a stool sample test may take up to 2 days. The healthcare provider may not give you antibiotics until he or she has the stool test results.  Diarrhea can cause dehydration. This is the loss of too much water and other fluids from the body. When this occurs, body fluid must be replaced. This can be done with oral rehydration solutions. Oral rehydration solutions are available at drugstores and grocery stores without a prescription. Sports drinks are not the best choice if you are very dehydrated. They have too much sugar and not enough electrolytes.  Home care  Follow all instructions given by your healthcare provider. Rest at home for the next 24 hours, or until you feel better. Avoid caffeine, tobacco, and alcohol.  These can make diarrhea, cramping, and pain worse.  If taking medicines:    Over-the-counter nausea and diarrhea medicines are generally OK unless you experience fever or blood stool. Check with your doctor first in those circumstances.    You may use acetaminophen or NSAID medicines like ibuprofen or naproxen to reduce pain and fever. Don t use these if you have chronic liver or kidney disease, or ever had a stomach ulcer or gastrointestinal bleeding. Don't use NSAID medicines if you are already taking one for another condition (like arthritis) or are on daily aspirin therapy (such as for heart disease or after a stroke). Talk with your healthcare provider first.    If antibiotics were prescribed, be sure you take them until they are finished. Don t stop taking them even when you feel better. Antibiotics must be taken as a full course.  To prevent the spread of illness:    Remember that washing with soap and water and using alcohol-based  is the best way to prevent the spread of infection. Dry your hands with a single use towel (like a paper towel).    Clean the toilet after each use.    Wash your hands before eating.    Wash your hands before and after preparing food. Keep in mind that people with diarrhea or vomiting should not prepare food for others.    Wash your hands after using cutting boards, countertops, and knives that have been in contact with raw foods.    Wash and then peel fruits and vegetables.    Keep uncooked meats away from cooked and ready-to-eat foods.    Use a food thermometer when cooking. Cook poultry to at least 165 F (74 C). Cook ground meat (beef, veal, pork, lamb) to at least 160 F (71 C). Cook fresh beef, veal, lamb, and pork to at least 145 F (63 C).    Don t eat raw or undercooked eggs (poached or ector side up), poultry, meat, or unpasteurized milk and juices.  Food and drinks  The main goal while treating vomiting or diarrhea is to prevent dehydration. This is done by taking  small amounts of liquids often.    Keep in mind that liquids are more important than food right now.    Drink only small amounts of liquids at a time.    Don t force yourself to eat, especially if you are having cramping, vomiting, or diarrhea. Don t eat large amounts at a time, even if you are hungry.    If you eat, avoid fatty, greasy, spicy, or fried foods.    Don t eat dairy foods or drink milk if you have diarrhea. These can make diarrhea worse.  During the first 24 hours you can try:    Oral rehydration solutions.  Sports drinks may be used if you are not too dehydrated and are otherwise healthy.    Soft drinks without caffeine    Ginger ale    Water (plain or flavored)    Decaf tea or coffee    Clear broth, consommé, or bouillon    Gelatin, popsicles, or frozen fruit juice bars  The second 24 hours, if you are feeling better, you can add:    Hot cereal, plain toast, bread, rolls, or crackers    Plain noodles, rice, mashed potatoes, chicken noodle soup, or rice soup    Unsweetened canned fruit (no pineapple)    Bananas  As you recover:    Limit fat intake to less than 15 grams per day. Don t eat margarine, butter, oils, mayonnaise, sauces, gravies, fried foods, peanut butter, meat, poultry, or fish.    Limit fiber. Don t eat raw or cooked vegetables, fresh fruits except bananas, or bran cereals.    Limit caffeine and chocolate.    Limit dairy.    Don t use spices or seasonings except salt.    Go back to your normal diet over time, as you feel better and your symptoms improve.    If the symptoms come back, go back to a simple diet or clear liquids.  Follow-up care  Follow up with your healthcare provider, or as advised. If a stool sample was taken or cultures were done, call the healthcare provider for the results as instructed.  Call 911  Call 911 if you have any of these symptoms:    Trouble breathing    Confusion    Extreme drowsiness or trouble walking    Loss of consciousness    Rapid heart rate    Chest  pain    Stiff neck    Seizure  When to seek medical advice  Call your healthcare provider right away if any of these occur:    Abdominal pain that gets worse    Constant lower right abdominal pain    Continued vomiting and inability to keep liquids down    Diarrhea more than 5 times a day    Blood in vomit or stool    Dark urine or no urine for 8 hours, dry mouth and tongue, tiredness, weakness, or dizziness    Drowsiness    New rash    You don t get better in 2 to 3 days    Fever of 100.4 F (38 C) or higher, or as directed by your healthcare provider  Nani last reviewed this educational content on 6/1/2018 2000-2021 [employer]. All rights reserved. This information is not intended as a substitute for professional medical care. Always follow your healthcare professional's instructions.         other

## 2022-10-06 NOTE — ASU PREOP CHECKLIST - RESPIRATORY RATE (BREATHS/MIN)
Please see my chart message, pt having chest tightness and states that inhaler and neb tx not working  please review and advise   12

## 2023-03-16 NOTE — PHYSICAL THERAPY INITIAL EVALUATION ADULT - SIT-TO-STAND BALANCE
Have You Had Red Spots Treated With Laser Before?: has not had previous treatments Additional History: Pt presents for PDL on face today. fair minus

## 2023-09-10 NOTE — H&P ADULT - NSICDXFAMILYHX_GEN_ALL_CORE_FT
[facility] My[facility] joseph or by visiting [email or web address] . It is important for you to review all labs/imaging/tests which are outside of the normal range with your  physician.    An ER visit does not replace a primary care visit, and many screening tests or follow-up tests cannot be ordered by an ER doctor or performed by the ER. Some tests may even require pre-approval.    If you do not have a primary care doctor, you may contact the one listed on your discharge paperwork or you may also call the [facility] Appointment Desk at [phone] , or [facility] at  [phone] to schedule an appointment, or establish care with a primary care doctor or even a specialist and to obtain information about local resources. It is important to your health that you have a primary care doctor.    Please take all medications as directed. We have done our best to select a medication for you that will treat your condition however, all medications may potentially have side-effects and it is impossible to predict which medications may give you side-effects or what those side-effects (if any) those medications may give you.  If you feel that you are having a negative effect or side-effect of any medication you should stop taking those medications immediately and seek medical attention. If you feel that you are having a life-threatening reaction call 911.        Do not drive, swim, climb to height, take a bath, operate heavy machinery, drink alcohol or take potentially sedating medications, sign any legal documents or make any important decisions for 24 hours if you have received any pain medications, sedatives or mood altering drugs during your ER visit or within 24 hours of taking them if they have been prescribed to you.     You can find additional resources for Dentists, hearing aids, durable medical equipment, low cost pharmacies and other resources at [email or web address]    FAMILY HISTORY:  No pertinent family history in first degree relatives

## 2024-02-13 NOTE — ED ADULT NURSE NOTE - NSFALLRSKOUTCOME_ED_ALL_ED
Called patient no answer, left voicemail for patient to return call to Donaldo Modi MD office at [phone].    ECO Representative: Please reach out to [address] [facility] via Epic Secure Chat to see if a team member is available to take patient's call.    If team member is unavailable, please document in this encounter that patient returned call and route to: RUTH MODI NURSE MS POOL [[phone]].    Fall with Harm Risk

## 2024-02-14 NOTE — PROGRESS NOTE ADULT - SUBJECTIVE AND OBJECTIVE BOX
Progress Note:  Provider Speciality                            Hospitalist      BRI AGUILAR [id number] 79y Female     CHIEF PRESENTING COMPLAINT:  Patient is a 79y old  Female who presents with a chief complaint of shortness of breath (25 Apr 2020 10:39)        SUBJECTIVE:  Patient was seen and examined at bedside.   Reports had SoB at home/ subjective fevers/ no cough/ having diarrhea now- loose watery- 3 episodes already.   No significant overnight events reported.     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 pt did not objectively measure temp at home. But did endorse body aches and general lethargy a/w poor po intake. She noticed some palpitations today which prompted her to come to [facility] ED. She lives w/ her daughter and denies any sick contacts and was compliant w/ social isolation and mostly stayed at home. Denies any N/V/D, orthopnea, PND, dysuria, frequency, urgency, or confusion.     In the ED, vitals:  irregular and Tmax 100.7. Was given x1 azithro and rocephin x1. lasix 40 x1 and Cardizem 10 IV push which rate controlled her at 110 bpm. (25 Apr 2020 10:39)        REVIEW OF SYSTEMS:    At least 10 systems were reviewed in ROS. All systems reviewed  are within normal limits except for the complaints as described in Subjective.    PAST MEDICAL & SURGICAL HISTORY:  PAST MEDICAL & SURGICAL HISTORY:  History of stomach cancer: 25 years ago-- had stomach surgery  Hyperlipidemia  CAD S/P percutaneous coronary angioplasty  Atrial fibrillation  HTN (hypertension)  Diverticulitis  S/P small bowel resection  H/O abdominal hysterectomy          VITAL SIGNS:  Vital Signs Last 24 Hrs  T(C): 36.2 (26 Apr 2020 05:35), Max: 36.2 (26 Apr 2020 05:35)  T(F): 97.1 (26 Apr 2020 05:35), Max: 97.1 (26 Apr 2020 05:35)  HR: 106 (26 Apr 2020 05:35) (95 - 108)  BP: 115/71 (26 Apr 2020 05:35) (95/50 - 115/71)  BP(mean): --  RR: 19 (26 Apr 2020 05:35) (19 - 21)  SpO2: 99% (26 Apr 2020 08:00) (96% - 99%)          PHYSICAL EXAMINATION:    General: AAO, Not in acute distress  HEENT:   EOMI, atraumatic  Heart: S1+S2 audible, no murmur  Lungs: bibasilar ronchi  Abdomen: Soft, non-tender, non-distended   CNS: AAO  , no focal deficits.  Extremities:  b/l LE edema.         CONSULTS:  Consultant(s) Notes Reviewed by me.   Care Discussed with Consultants/Other Providers where required.        MEDICATIONS:  MEDICATIONS  (STANDING):  aspirin  chewable 81 milliGRAM(s) Oral daily  furosemide   Injectable 20 milliGRAM(s) IV Push daily  metoprolol tartrate 50 milliGRAM(s) Oral two times a day  nystatin Powder 1 Application(s) Topical three times a day  pantoprazole    Tablet 40 milliGRAM(s) Oral two times a day  rivaroxaban 15 milliGRAM(s) Oral with dinner    MEDICATIONS  (PRN):  acetaminophen   Tablet .. 650 milliGRAM(s) Oral every 4 hours PRN Temp greater or equal to 38.5C (101.3F)  ALBUTerol    90 MICROgram(s) HFA Inhaler 2 Puff(s) Inhalation every 4 hours PRN Shortness of Breath and/or Wheezing  ondansetron Injectable 4 milliGRAM(s) IV Push every 6 hours PRN Nausea and/or Vomiting      LABOROTORY DATA/MICROBIOLOGY/I & O's:                        13.3   11.73 )-----------( 145      ( 26 Apr 2020 07:00 )             43.9     04-26    136  |  98  |  23<H>  ----------------------------<  109<H>  4.5   |  23  |  1.6<H>    Ca    8.7      26 Apr 2020 07:00  Phos  3.9     04-25  Mg     1.8     04-25    TPro  5.6<L>  /  Alb  3.1<L>  /  TBili  0.7  /  DBili  x   /  AST  35  /  ALT  21  /  AlkPhos  157<H>  04-26    PT/INR - ( 25 Apr 2020 16:52 )   PT: 18.90 sec;   INR: 1.64 ratio             CAPILLARY BLOOD GLUCOSE                    04-25-20 @ 07:01  -  04-26-20 @ 07:00  --------------------------------------------------------  IN: 300 mL / OUT: 400 mL / NET: -100 mL              ASSESSMENT/Plan:  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    Acute hypoxic respiratory failure likely fluid overload with underlying acute CHF exacerbation (unknown type) due to A-FIb with RVR/ low suspicion for PNA / ruled out COvid PNA:   required NRB mask in ED/ on NC O2 now- continue to maintain SPO2 92-94%/ BIPAP PRN/HS.  lasix dose decreased to 20mg IVP daily with worsening renal function and continuous diarrhea to prevent dehydration.   Monitor I&O's/ daily weights/ monitor electrolytes/ maintain K>4/ mag >2.   fluid restriction to 1.5L/day.   afebrile/ no significant leukocytosis/Pro-pascual elevated -has diarrhea- might need vanco oral if  c-diff +ve.  repeat CXR in am.   c/w lopressor/ xarelto.   albuterol prn for sob.  fu TTE.   Cardio consult.     A-FIB with RVR:   HR better today.   c/w lopressor/ xarelto.     Diarrhea:   send stool GI PCR/ C-diff.   contact isolation.    VANI on CKD stage 3 with HAGMA- pre-renal with diuresis and diarrhea:   dec lasix dose to 20mg IVP.   monitor I&O's.  nephrology eval.     HTN:   stable BP  c/w lopressor.     dvt ppx on xarelto.          Progress note handoff:   pending: clinical improvement/ acute with fluid overload/ diarrhea.   disposition: unknown at this time.   discussion: plan of care with patient- satisfied. No

## 2025-03-31 NOTE — ASU DISCHARGE PLAN (ADULT/PEDIATRIC) - DISCHARGE TO
"Chief Complaint:   Follow-up (Here for follow up visit.  Denies any chest discomfort, dyspnea or dizziness. )    History Of Present Illness:    .Ms Sage returns in follow up.  Denies chest pain, sob, palpitations or pedal edema.           Last Recorded Vitals:  Blood pressure (!) 143/98, pulse 92, height 1.626 m (5' 4\"), weight 142 kg (314 lb 1.6 oz), SpO2 99%.     Past Medical History:  Past Medical History:   Diagnosis Date    Anxiety 2024    COVID     Gastroesophageal reflux disease 2024    Morbid obesity due to excess calories (Multi) 2017    Obesity, Class III, BMI 40-49.9 (morbid obesity) (Multi) 2024        Past Surgical History:  Past Surgical History:   Procedure Laterality Date    ADENOIDECTOMY       SECTION, CLASSIC      SINUS SURGERY      TONSILLECTOMY         Social History:  Social History     Socioeconomic History    Marital status:     Number of children: 1   Tobacco Use    Smoking status: Never    Smokeless tobacco: Never   Vaping Use    Vaping status: Never Used   Substance and Sexual Activity    Drug use: Never       Family History:  Family History   Problem Relation Name Age of Onset    Diabetes Mother      Other (COVID.) Mother      Other (heart problem) Father           Allergies:  Erythromycin    Outpatient Medications:  Current Outpatient Medications   Medication Sig Dispense Refill    busPIRone (Buspar) 10 mg tablet Take 1 tablet (10 mg) by mouth 2 times a day.      famotidine (Pepcid) 20 mg tablet Take 1 tablet (20 mg) by mouth 2 times a day.      pantoprazole (ProtoNix) 40 mg EC tablet Take 1 tablet (40 mg) by mouth once daily in the morning. Take before meals. Do not crush, chew, or split.      polyethylene glycol (Glycolax, Miralax) 17 gram packet Take 17 g by mouth once daily. As needed.      simethicone (Mylicon) 125 mg chewable tablet Chew 1 tablet (125 mg) every 6 hours if needed for flatulence.      semaglutide (Ozempic) 0.25 mg or 0.5 mg(2 " mg/1.5 mL) pen injector Inject under the skin 1 (one) time per week. (Patient not taking: Reported on 3/31/2025)       No current facility-administered medications for this visit.        Physical Exam:  Cardiovascular:      PMI at left midclavicular line. Normal rate. Regular rhythm. Normal S1. Normal S2.       Murmurs: There is no murmur.      No gallop.  No click. No rub.   Pulses:     Intact distal pulses.   Edema:     Peripheral edema absent.         ROS:  Review of Systems   Constitutional: Negative.   Cardiovascular: Negative.    Respiratory: Negative.     Skin: Negative.    Musculoskeletal: Negative.    Gastrointestinal: Negative.    Genitourinary: Negative.    Neurological: Negative.           Last Labs:  CBC -  Lab Results   Component Value Date    WBC 7.3 12/18/2024    HGB 13.4 12/18/2024    HCT 40.5 12/18/2024    MCV 86 12/18/2024     12/18/2024       CMP -  Lab Results   Component Value Date    CALCIUM 9.8 12/18/2024    PROT 7.6 12/18/2024    ALBUMIN 4.4 12/18/2024    AST 15 12/18/2024    ALT 16 12/18/2024    ALKPHOS 57 12/18/2024    BILITOT 0.4 12/18/2024       LIPID PANEL -   Lab Results   Component Value Date    CHOL 150 12/09/2024    TRIG 102 12/09/2024    HDL 54.4 12/09/2024    CHHDL 2.8 12/09/2024    VLDL 20 12/09/2024    NHDL 96 12/09/2024       RENAL FUNCTION PANEL -   Lab Results   Component Value Date    GLUCOSE 95 12/18/2024     12/18/2024    K 3.8 12/18/2024     12/18/2024    CO2 28 12/18/2024    ANIONGAP 10 12/18/2024    BUN 12 12/18/2024    CREATININE 0.86 12/18/2024    CALCIUM 9.8 12/18/2024    ALBUMIN 4.4 12/18/2024        Lab Results   Component Value Date    HGBA1C 5.1 12/09/2024         Assessment/Plan   Problem List Items Addressed This Visit    None  1.?  Coronary artery disease.  The patient is a middle-aged white female whose cardiac risk factors are negative for hypertension diabetes or hyperlipidemia.  Lipid panel included cholesterol 147 LDL 73 HDL 60  triglycerides 69 untreated.  She does have obesity.  The patient is self-admitted health anxiety.  The patient was seen by a outside cardiologist for borderline elevated blood pressure.  Patient has been monitoring her blood pressure at home which evidently includes readings in the range of 130//98.  Of note she has been a lifetime non-smoker and nondrinker.  She did have an EKG performed 2023 which showed sinus rhythm and was unremarkable.  Her family history is notable for father.  Hypertension CVA and CABG.  Mother had hypertension and CHF.  Patient was recently seen at [facility] emergency room for burning in pressure in the chest.  Her evaluation at that time included an EKG showing sinus rhythm normal record CBC CMP were normal and the high-sensitivity troponins were less than 3 twice.  The patient did have COVID in 2021.  Patient will have a CT coronary calcium score was zero when done 2024.  2.  Obesity.  3.  GERD.  4.  History of health anxiety  5.  Status post varicose vein ablation  6.  Status post  section.  7.  Status post tonsillectomy.              Leila Styles, APRN-CNP   Home